# Patient Record
Sex: FEMALE | Race: WHITE | Employment: UNEMPLOYED | URBAN - METROPOLITAN AREA
[De-identification: names, ages, dates, MRNs, and addresses within clinical notes are randomized per-mention and may not be internally consistent; named-entity substitution may affect disease eponyms.]

---

## 2017-02-03 RX ORDER — ESCITALOPRAM OXALATE 20 MG/1
TABLET ORAL
Qty: 30 TABLET | Refills: 3 | Status: SHIPPED | OUTPATIENT
Start: 2017-02-03 | End: 2017-04-21 | Stop reason: SDUPTHER

## 2017-02-15 RX ORDER — OXCARBAZEPINE 150 MG/1
TABLET, FILM COATED ORAL
Qty: 90 TABLET | Refills: 1 | Status: SHIPPED | OUTPATIENT
Start: 2017-02-15 | End: 2017-05-09 | Stop reason: SDUPTHER

## 2017-03-17 RX ORDER — MELOXICAM 15 MG/1
TABLET ORAL
Qty: 30 TABLET | Refills: 1 | Status: SHIPPED | OUTPATIENT
Start: 2017-03-17 | End: 2017-04-21 | Stop reason: SDUPTHER

## 2017-04-24 RX ORDER — MELOXICAM 15 MG/1
TABLET ORAL
Qty: 30 TABLET | Refills: 0 | OUTPATIENT
Start: 2017-04-24 | End: 2017-05-09

## 2017-04-24 RX ORDER — ESCITALOPRAM OXALATE 20 MG/1
TABLET ORAL
Qty: 30 TABLET | Refills: 0 | OUTPATIENT
Start: 2017-04-24 | End: 2017-07-18 | Stop reason: SDUPTHER

## 2017-05-09 ENCOUNTER — OFFICE VISIT (OUTPATIENT)
Dept: FAMILY MEDICINE CLINIC | Age: 57
End: 2017-05-09

## 2017-05-09 VITALS
SYSTOLIC BLOOD PRESSURE: 110 MMHG | BODY MASS INDEX: 20.89 KG/M2 | RESPIRATION RATE: 14 BRPM | HEART RATE: 81 BPM | DIASTOLIC BLOOD PRESSURE: 68 MMHG | HEIGHT: 66 IN | OXYGEN SATURATION: 96 % | WEIGHT: 130 LBS

## 2017-05-09 DIAGNOSIS — J31.0 CHRONIC RHINITIS: ICD-10-CM

## 2017-05-09 DIAGNOSIS — R31.9 HEMATURIA: ICD-10-CM

## 2017-05-09 DIAGNOSIS — F17.210 CIGARETTE NICOTINE DEPENDENCE WITHOUT COMPLICATION: ICD-10-CM

## 2017-05-09 DIAGNOSIS — K76.89 LIVER NODULE: ICD-10-CM

## 2017-05-09 DIAGNOSIS — Z12.39 BREAST CANCER SCREENING: ICD-10-CM

## 2017-05-09 DIAGNOSIS — R51.9 CHRONIC DAILY HEADACHE: Primary | ICD-10-CM

## 2017-05-09 DIAGNOSIS — Z12.2 ENCOUNTER FOR SCREENING FOR LUNG CANCER: ICD-10-CM

## 2017-05-09 DIAGNOSIS — M17.10 ARTHRITIS OF KNEE: ICD-10-CM

## 2017-05-09 DIAGNOSIS — F17.210 NICOTINE DEPENDENCE, CIGARETTES, UNCOMPLICATED: ICD-10-CM

## 2017-05-09 LAB
BILIRUBIN, POC: ABNORMAL
BLOOD URINE, POC: ABNORMAL
CLARITY, POC: CLEAR
COLOR, POC: YELLOW
GLUCOSE URINE, POC: ABNORMAL
KETONES, POC: ABNORMAL
LEUKOCYTE EST, POC: ABNORMAL
NITRITE, POC: ABNORMAL
PH, POC: 7.5
PROTEIN, POC: ABNORMAL
SPECIFIC GRAVITY, POC: 1.02
UROBILINOGEN, POC: 0.2

## 2017-05-09 PROCEDURE — 81002 URINALYSIS NONAUTO W/O SCOPE: CPT | Performed by: FAMILY MEDICINE

## 2017-05-09 PROCEDURE — G0296 VISIT TO DETERM LDCT ELIG: HCPCS | Performed by: FAMILY MEDICINE

## 2017-05-09 PROCEDURE — 99214 OFFICE O/P EST MOD 30 MIN: CPT | Performed by: FAMILY MEDICINE

## 2017-05-09 RX ORDER — OXYBUTYNIN CHLORIDE 10 MG/1
10 TABLET, EXTENDED RELEASE ORAL DAILY
Qty: 30 TABLET | Refills: 2 | Status: SHIPPED | OUTPATIENT
Start: 2017-05-09 | End: 2017-08-23 | Stop reason: SDUPTHER

## 2017-05-09 RX ORDER — MOMETASONE FUROATE 50 UG/1
2 SPRAY, METERED NASAL DAILY
Qty: 1 INHALER | Refills: 2 | Status: SHIPPED | OUTPATIENT
Start: 2017-05-09 | End: 2017-08-31 | Stop reason: ALTCHOICE

## 2017-05-09 RX ORDER — TIZANIDINE 4 MG/1
4 TABLET ORAL NIGHTLY
Qty: 30 TABLET | Refills: 2 | Status: SHIPPED | OUTPATIENT
Start: 2017-05-09 | End: 2017-08-31 | Stop reason: ALTCHOICE

## 2017-05-09 RX ORDER — DICLOFENAC SODIUM 75 MG/1
75 TABLET, DELAYED RELEASE ORAL 2 TIMES DAILY
Qty: 60 TABLET | Refills: 2 | Status: SHIPPED | OUTPATIENT
Start: 2017-05-09 | End: 2017-08-31 | Stop reason: ALTCHOICE

## 2017-05-09 ASSESSMENT — PATIENT HEALTH QUESTIONNAIRE - PHQ9
2. FEELING DOWN, DEPRESSED OR HOPELESS: 0
SUM OF ALL RESPONSES TO PHQ9 QUESTIONS 1 & 2: 0
SUM OF ALL RESPONSES TO PHQ QUESTIONS 1-9: 0
1. LITTLE INTEREST OR PLEASURE IN DOING THINGS: 0

## 2017-05-11 ENCOUNTER — TELEPHONE (OUTPATIENT)
Dept: FAMILY MEDICINE CLINIC | Age: 57
End: 2017-05-11

## 2017-07-18 RX ORDER — ESCITALOPRAM OXALATE 20 MG/1
TABLET ORAL
Qty: 30 TABLET | Refills: 2 | Status: SHIPPED | OUTPATIENT
Start: 2017-07-18 | End: 2017-10-31 | Stop reason: SDUPTHER

## 2017-07-19 ENCOUNTER — TELEPHONE (OUTPATIENT)
Dept: FAMILY MEDICINE CLINIC | Age: 57
End: 2017-07-19

## 2017-07-19 DIAGNOSIS — F17.210 CIGARETTE NICOTINE DEPENDENCE WITHOUT COMPLICATION: ICD-10-CM

## 2017-07-19 DIAGNOSIS — F17.210 NICOTINE DEPENDENCE, CIGARETTES, UNCOMPLICATED: ICD-10-CM

## 2017-07-19 DIAGNOSIS — R16.0 LIVER MASS: Primary | ICD-10-CM

## 2017-08-23 RX ORDER — OXYBUTYNIN CHLORIDE 10 MG/1
TABLET, EXTENDED RELEASE ORAL
Qty: 30 TABLET | Refills: 1 | Status: SHIPPED | OUTPATIENT
Start: 2017-08-23 | End: 2018-05-09 | Stop reason: SDUPTHER

## 2017-08-31 ENCOUNTER — OFFICE VISIT (OUTPATIENT)
Dept: FAMILY MEDICINE CLINIC | Age: 57
End: 2017-08-31

## 2017-08-31 VITALS
RESPIRATION RATE: 12 BRPM | OXYGEN SATURATION: 96 % | HEIGHT: 66 IN | HEART RATE: 89 BPM | SYSTOLIC BLOOD PRESSURE: 116 MMHG | BODY MASS INDEX: 19.44 KG/M2 | DIASTOLIC BLOOD PRESSURE: 78 MMHG | WEIGHT: 121 LBS

## 2017-08-31 DIAGNOSIS — M79.644 PAIN OF RIGHT THUMB: ICD-10-CM

## 2017-08-31 DIAGNOSIS — M25.552 LEFT HIP PAIN: Primary | ICD-10-CM

## 2017-08-31 DIAGNOSIS — G89.29 CHRONIC LOW BACK PAIN, UNSPECIFIED BACK PAIN LATERALITY, WITH SCIATICA PRESENCE UNSPECIFIED: ICD-10-CM

## 2017-08-31 DIAGNOSIS — J30.1 ALLERGIC RHINITIS DUE TO POLLEN, UNSPECIFIED RHINITIS SEASONALITY: ICD-10-CM

## 2017-08-31 DIAGNOSIS — R39.11 URINARY HESITANCY: ICD-10-CM

## 2017-08-31 DIAGNOSIS — R16.0 LIVER MASS: ICD-10-CM

## 2017-08-31 DIAGNOSIS — F33.41 RECURRENT MAJOR DEPRESSIVE DISORDER, IN PARTIAL REMISSION (HCC): ICD-10-CM

## 2017-08-31 DIAGNOSIS — M46.1 SACROILIITIS (HCC): ICD-10-CM

## 2017-08-31 DIAGNOSIS — M54.5 CHRONIC LOW BACK PAIN, UNSPECIFIED BACK PAIN LATERALITY, WITH SCIATICA PRESENCE UNSPECIFIED: ICD-10-CM

## 2017-08-31 DIAGNOSIS — R63.4 WEIGHT LOSS: ICD-10-CM

## 2017-08-31 LAB
BILIRUBIN, POC: ABNORMAL
BLOOD URINE, POC: ABNORMAL
CLARITY, POC: CLEAR
COLOR, POC: YELLOW
GLUCOSE URINE, POC: ABNORMAL
KETONES, POC: ABNORMAL
LEUKOCYTE EST, POC: ABNORMAL
NITRITE, POC: ABNORMAL
PH, POC: 6
PROTEIN, POC: ABNORMAL
SPECIFIC GRAVITY, POC: 1.02
UROBILINOGEN, POC: 0.2

## 2017-08-31 PROCEDURE — 99214 OFFICE O/P EST MOD 30 MIN: CPT | Performed by: FAMILY MEDICINE

## 2017-08-31 PROCEDURE — 81002 URINALYSIS NONAUTO W/O SCOPE: CPT | Performed by: FAMILY MEDICINE

## 2017-08-31 RX ORDER — BUPROPION HYDROCHLORIDE 150 MG/1
150 TABLET ORAL EVERY MORNING
Qty: 30 TABLET | Refills: 0 | Status: SHIPPED | OUTPATIENT
Start: 2017-08-31 | End: 2017-09-26 | Stop reason: SDUPTHER

## 2017-08-31 RX ORDER — MELOXICAM 15 MG/1
15 TABLET ORAL DAILY
Qty: 30 TABLET | Refills: 2 | Status: SHIPPED | OUTPATIENT
Start: 2017-08-31 | End: 2017-10-12

## 2017-09-13 RX ORDER — OXCARBAZEPINE 150 MG/1
TABLET, FILM COATED ORAL
Qty: 90 TABLET | Refills: 0 | Status: SHIPPED | OUTPATIENT
Start: 2017-09-13 | End: 2018-01-01 | Stop reason: SDUPTHER

## 2017-09-15 ENCOUNTER — HOSPITAL ENCOUNTER (OUTPATIENT)
Dept: CT IMAGING | Age: 57
Discharge: HOME | End: 2017-09-15
Admitting: FAMILY MEDICINE

## 2017-09-15 DIAGNOSIS — M46.1 SACROILIITIS (HCC): ICD-10-CM

## 2017-09-15 DIAGNOSIS — M25.552 LEFT HIP PAIN: ICD-10-CM

## 2017-09-15 DIAGNOSIS — F17.210 CIGARETTE NICOTINE DEPENDENCE WITHOUT COMPLICATION: ICD-10-CM

## 2017-09-15 DIAGNOSIS — M79.644 PAIN OF RIGHT THUMB: ICD-10-CM

## 2017-09-15 DIAGNOSIS — R16.0 LIVER MASS: ICD-10-CM

## 2017-09-15 DIAGNOSIS — R16.0 HEPATOMEGALY, NOT ELSEWHERE CLASSIFIED: ICD-10-CM

## 2017-09-15 DIAGNOSIS — M54.5 CHRONIC LOW BACK PAIN, UNSPECIFIED BACK PAIN LATERALITY, WITH SCIATICA PRESENCE UNSPECIFIED: ICD-10-CM

## 2017-09-15 DIAGNOSIS — F17.210 NICOTINE DEPENDENCE, CIGARETTES, UNCOMPLICATED: ICD-10-CM

## 2017-09-15 DIAGNOSIS — G89.29 CHRONIC LOW BACK PAIN, UNSPECIFIED BACK PAIN LATERALITY, WITH SCIATICA PRESENCE UNSPECIFIED: ICD-10-CM

## 2017-09-18 ENCOUNTER — TELEPHONE (OUTPATIENT)
Dept: CASE MANAGEMENT | Age: 57
End: 2017-09-18

## 2017-09-20 ENCOUNTER — TELEPHONE (OUTPATIENT)
Dept: FAMILY MEDICINE CLINIC | Age: 57
End: 2017-09-20

## 2017-09-26 RX ORDER — BUPROPION HYDROCHLORIDE 150 MG/1
TABLET ORAL
Qty: 30 TABLET | Refills: 0 | Status: SHIPPED | OUTPATIENT
Start: 2017-09-26 | End: 2017-10-31 | Stop reason: SDUPTHER

## 2017-10-12 ENCOUNTER — TELEPHONE (OUTPATIENT)
Dept: FAMILY MEDICINE CLINIC | Age: 57
End: 2017-10-12

## 2017-10-12 RX ORDER — NABUMETONE 750 MG/1
1500 TABLET, FILM COATED ORAL DAILY
Qty: 60 TABLET | Refills: 0 | Status: SHIPPED | OUTPATIENT
Start: 2017-10-12 | End: 2017-11-07

## 2017-10-31 RX ORDER — BUPROPION HYDROCHLORIDE 150 MG/1
TABLET ORAL
Qty: 30 TABLET | Refills: 0 | Status: SHIPPED | OUTPATIENT
Start: 2017-10-31 | End: 2017-11-07 | Stop reason: SDUPTHER

## 2017-10-31 RX ORDER — ESCITALOPRAM OXALATE 20 MG/1
TABLET ORAL
Qty: 30 TABLET | Refills: 1 | Status: SHIPPED | OUTPATIENT
Start: 2017-10-31 | End: 2018-01-07 | Stop reason: SDUPTHER

## 2017-11-07 ENCOUNTER — OFFICE VISIT (OUTPATIENT)
Dept: FAMILY MEDICINE CLINIC | Age: 57
End: 2017-11-07

## 2017-11-07 VITALS
RESPIRATION RATE: 14 BRPM | WEIGHT: 122 LBS | DIASTOLIC BLOOD PRESSURE: 66 MMHG | BODY MASS INDEX: 19.61 KG/M2 | HEART RATE: 58 BPM | HEIGHT: 66 IN | SYSTOLIC BLOOD PRESSURE: 102 MMHG

## 2017-11-07 DIAGNOSIS — D13.4 ADENOMA OF LIVER: Primary | ICD-10-CM

## 2017-11-07 DIAGNOSIS — G40.909 SEIZURE DISORDER (HCC): ICD-10-CM

## 2017-11-07 DIAGNOSIS — M18.11 PRIMARY OSTEOARTHRITIS OF FIRST CARPOMETACARPAL JOINT OF RIGHT HAND: ICD-10-CM

## 2017-11-07 DIAGNOSIS — D35.02 ADRENAL ADENOMA, LEFT: ICD-10-CM

## 2017-11-07 DIAGNOSIS — F33.40 RECURRENT MAJOR DEPRESSIVE DISORDER, IN REMISSION (HCC): ICD-10-CM

## 2017-11-07 DIAGNOSIS — M15.9 PRIMARY OSTEOARTHRITIS INVOLVING MULTIPLE JOINTS: ICD-10-CM

## 2017-11-07 PROCEDURE — 99213 OFFICE O/P EST LOW 20 MIN: CPT | Performed by: FAMILY MEDICINE

## 2017-11-07 RX ORDER — BUPROPION HYDROCHLORIDE 150 MG/1
TABLET ORAL
Qty: 30 TABLET | Refills: 5 | Status: SHIPPED | OUTPATIENT
Start: 2017-11-07 | End: 2018-05-23 | Stop reason: SDUPTHER

## 2017-11-07 RX ORDER — METHYLPREDNISOLONE ACETATE 80 MG/ML
20 INJECTION, SUSPENSION INTRA-ARTICULAR; INTRALESIONAL; INTRAMUSCULAR; SOFT TISSUE ONCE
Status: CANCELLED | OUTPATIENT
Start: 2017-11-07 | End: 2017-11-07

## 2017-11-08 RX ORDER — METHYLPREDNISOLONE ACETATE 80 MG/ML
80 INJECTION, SUSPENSION INTRA-ARTICULAR; INTRALESIONAL; INTRAMUSCULAR; SOFT TISSUE ONCE
Status: SHIPPED | OUTPATIENT
Start: 2017-11-07

## 2017-11-08 RX ORDER — METHYLPREDNISOLONE ACETATE 80 MG/ML
20 INJECTION, SUSPENSION INTRA-ARTICULAR; INTRALESIONAL; INTRAMUSCULAR; SOFT TISSUE ONCE
Status: SHIPPED | OUTPATIENT
Start: 2017-11-08

## 2018-01-02 RX ORDER — OXCARBAZEPINE 150 MG/1
TABLET, FILM COATED ORAL
Qty: 90 TABLET | Refills: 0 | Status: SHIPPED | OUTPATIENT
Start: 2018-01-02 | End: 2018-07-09 | Stop reason: SDUPTHER

## 2018-01-08 RX ORDER — ESCITALOPRAM OXALATE 20 MG/1
TABLET ORAL
Qty: 30 TABLET | Refills: 0 | Status: SHIPPED | OUTPATIENT
Start: 2018-01-08 | End: 2018-02-04 | Stop reason: SDUPTHER

## 2018-05-09 ENCOUNTER — OFFICE VISIT (OUTPATIENT)
Dept: FAMILY MEDICINE CLINIC | Age: 58
End: 2018-05-09

## 2018-05-09 VITALS
BODY MASS INDEX: 21.53 KG/M2 | WEIGHT: 134 LBS | DIASTOLIC BLOOD PRESSURE: 70 MMHG | SYSTOLIC BLOOD PRESSURE: 110 MMHG | HEIGHT: 66 IN

## 2018-05-09 DIAGNOSIS — F33.40 RECURRENT MAJOR DEPRESSIVE DISORDER, IN REMISSION (HCC): ICD-10-CM

## 2018-05-09 DIAGNOSIS — F17.210 CIGARETTE NICOTINE DEPENDENCE WITHOUT COMPLICATION: ICD-10-CM

## 2018-05-09 DIAGNOSIS — N32.81 OAB (OVERACTIVE BLADDER): ICD-10-CM

## 2018-05-09 DIAGNOSIS — M18.11 PRIMARY OSTEOARTHRITIS OF FIRST CARPOMETACARPAL JOINT OF RIGHT HAND: Primary | ICD-10-CM

## 2018-05-09 PROCEDURE — 99213 OFFICE O/P EST LOW 20 MIN: CPT | Performed by: FAMILY MEDICINE

## 2018-05-09 PROCEDURE — 20600 DRAIN/INJ JOINT/BURSA W/O US: CPT | Performed by: FAMILY MEDICINE

## 2018-05-09 RX ORDER — OXYBUTYNIN CHLORIDE 10 MG/1
TABLET, EXTENDED RELEASE ORAL
Qty: 30 TABLET | Refills: 5 | Status: SHIPPED | OUTPATIENT
Start: 2018-05-09 | End: 2018-10-23 | Stop reason: ALTCHOICE

## 2018-05-15 RX ORDER — METHYLPREDNISOLONE ACETATE 80 MG/ML
40 INJECTION, SUSPENSION INTRA-ARTICULAR; INTRALESIONAL; INTRAMUSCULAR; SOFT TISSUE ONCE
Status: SHIPPED | OUTPATIENT
Start: 2018-05-09

## 2018-05-23 RX ORDER — BUPROPION HYDROCHLORIDE 150 MG/1
TABLET ORAL
Qty: 30 TABLET | Refills: 4 | Status: SHIPPED | OUTPATIENT
Start: 2018-05-23 | End: 2018-10-23

## 2018-09-06 ENCOUNTER — TELEPHONE (OUTPATIENT)
Dept: FAMILY MEDICINE CLINIC | Age: 58
End: 2018-09-06

## 2018-09-06 ENCOUNTER — OFFICE VISIT (OUTPATIENT)
Dept: FAMILY MEDICINE CLINIC | Age: 58
End: 2018-09-06

## 2018-09-06 VITALS
HEIGHT: 66 IN | DIASTOLIC BLOOD PRESSURE: 64 MMHG | WEIGHT: 122 LBS | SYSTOLIC BLOOD PRESSURE: 98 MMHG | BODY MASS INDEX: 19.61 KG/M2

## 2018-09-06 DIAGNOSIS — M65.4 DE QUERVAIN'S TENOSYNOVITIS, RIGHT: Primary | ICD-10-CM

## 2018-09-06 PROCEDURE — 20550 NJX 1 TENDON SHEATH/LIGAMENT: CPT | Performed by: FAMILY MEDICINE

## 2018-09-06 RX ORDER — METHYLPREDNISOLONE ACETATE 40 MG/ML
40 INJECTION, SUSPENSION INTRA-ARTICULAR; INTRALESIONAL; INTRAMUSCULAR; SOFT TISSUE ONCE
Status: COMPLETED | OUTPATIENT
Start: 2018-09-06 | End: 2018-09-06

## 2018-09-06 RX ADMIN — METHYLPREDNISOLONE ACETATE 40 MG: 40 INJECTION, SUSPENSION INTRA-ARTICULAR; INTRALESIONAL; INTRAMUSCULAR; SOFT TISSUE at 17:40

## 2018-09-06 NOTE — PROGRESS NOTES
Subjective:      Patient ID: Sebastien Caceres is a 66 y.o. female. HPI  Chief Complaint   Patient presents with    Injections     CORITSONE INJECTION IN HAND    having more pain right >> left base of thumb - had injections in mc-c joint in past which seemed to help - pain worst to bend thumb down but now any movement hurts - slight swelling  No am stiffness      Review of Systems    Objective:   Physical Exam   Constitutional: She is oriented to person, place, and time. She appears well-developed and well-nourished. No distress. Eyes: No scleral icterus. Pulmonary/Chest: Effort normal.   Musculoskeletal: She exhibits no edema. Sl swelling/ ttp along mc-c joint area right thumb and up base of first mc bone along tendon  nv grossly intact  From but + finkelsteins   Neurological: She is alert and oriented to person, place, and time. Skin: Skin is warm and dry. Psychiatric: She has a normal mood and affect. Assessment:       Diagnosis Orders   1. De Quervain's tenosynovitis, right             Plan:      40mg depomedrol/ 1cc 0.25% marcaine injected along flexor tendon thumb - pt tolerated well  Dw/ pt risks including flare/ expectations for relief in 2-3 days  F/u and see how doing.   If feels better - would benefit from thumb spica splint to lessen strain on tendon  F/u prn        Monico Weber MD

## 2018-09-24 RX ORDER — ESCITALOPRAM OXALATE 20 MG/1
TABLET ORAL
Qty: 30 TABLET | Refills: 4 | Status: SHIPPED | OUTPATIENT
Start: 2018-09-24 | End: 2019-01-30 | Stop reason: SDUPTHER

## 2018-09-29 ENCOUNTER — HOSPITAL ENCOUNTER (EMERGENCY)
Age: 58
Discharge: HOME | End: 2018-09-29
Attending: EMERGENCY MEDICINE | Admitting: EMERGENCY MEDICINE
Payer: COMMERCIAL

## 2018-09-29 VITALS
HEIGHT: 66 IN | DIASTOLIC BLOOD PRESSURE: 87 MMHG | OXYGEN SATURATION: 99 % | SYSTOLIC BLOOD PRESSURE: 146 MMHG | WEIGHT: 121.47 LBS | TEMPERATURE: 98.3 F | HEART RATE: 65 BPM | BODY MASS INDEX: 19.52 KG/M2 | RESPIRATION RATE: 20 BRPM

## 2018-09-29 DIAGNOSIS — R51.9 ACUTE NONINTRACTABLE HEADACHE, UNSPECIFIED HEADACHE TYPE: Primary | ICD-10-CM

## 2018-09-29 LAB
ANION GAP SERPL CALCULATED.3IONS-SCNC: 10 MMOL/L (ref 3–16)
BACTERIA: ABNORMAL /HPF
BASOPHILS ABSOLUTE: 0.1 K/UL (ref 0–0.2)
BASOPHILS RELATIVE PERCENT: 0.7 %
BILIRUBIN URINE: NEGATIVE
BLOOD, URINE: ABNORMAL
BUN BLDV-MCNC: 11 MG/DL (ref 7–20)
CALCIUM SERPL-MCNC: 9.8 MG/DL (ref 8.3–10.6)
CHLORIDE BLD-SCNC: 103 MMOL/L (ref 99–110)
CLARITY: CLEAR
CO2: 28 MMOL/L (ref 21–32)
COLOR: YELLOW
CREAT SERPL-MCNC: 0.8 MG/DL (ref 0.6–1.1)
EOSINOPHILS ABSOLUTE: 0.2 K/UL (ref 0–0.6)
EOSINOPHILS RELATIVE PERCENT: 3 %
EPITHELIAL CELLS, UA: ABNORMAL /HPF
GFR AFRICAN AMERICAN: >60
GFR NON-AFRICAN AMERICAN: >60
GLUCOSE BLD-MCNC: 107 MG/DL (ref 70–99)
GLUCOSE URINE: NEGATIVE MG/DL
HCT VFR BLD CALC: 48.4 % (ref 36–48)
HEMOGLOBIN: 16 G/DL (ref 12–16)
KETONES, URINE: NEGATIVE MG/DL
LEUKOCYTE ESTERASE, URINE: ABNORMAL
LYMPHOCYTES ABSOLUTE: 1.8 K/UL (ref 1–5.1)
LYMPHOCYTES RELATIVE PERCENT: 22.8 %
MCH RBC QN AUTO: 32.4 PG (ref 26–34)
MCHC RBC AUTO-ENTMCNC: 33.1 G/DL (ref 31–36)
MCV RBC AUTO: 97.7 FL (ref 80–100)
MICROSCOPIC EXAMINATION: YES
MONOCYTES ABSOLUTE: 0.9 K/UL (ref 0–1.3)
MONOCYTES RELATIVE PERCENT: 11.2 %
NEUTROPHILS ABSOLUTE: 5.1 K/UL (ref 1.7–7.7)
NEUTROPHILS RELATIVE PERCENT: 62.3 %
NITRITE, URINE: NEGATIVE
PDW BLD-RTO: 12.9 % (ref 12.4–15.4)
PH UA: 7
PLATELET # BLD: 329 K/UL (ref 135–450)
PMV BLD AUTO: 7 FL (ref 5–10.5)
POTASSIUM SERPL-SCNC: 5.4 MMOL/L (ref 3.5–5.1)
PROTEIN UA: NEGATIVE MG/DL
RBC # BLD: 4.95 M/UL (ref 4–5.2)
RBC UA: ABNORMAL /HPF (ref 0–2)
SODIUM BLD-SCNC: 141 MMOL/L (ref 136–145)
SPECIFIC GRAVITY UA: 1.01
URINE REFLEX TO CULTURE: YES
URINE TYPE: ABNORMAL
UROBILINOGEN, URINE: 0.2 E.U./DL
WBC # BLD: 8.1 K/UL (ref 4–11)
WBC UA: ABNORMAL /HPF (ref 0–5)

## 2018-09-29 PROCEDURE — 99284 EMERGENCY DEPT VISIT MOD MDM: CPT

## 2018-09-29 PROCEDURE — 6370000000 HC RX 637 (ALT 250 FOR IP): Performed by: EMERGENCY MEDICINE

## 2018-09-29 PROCEDURE — 96375 TX/PRO/DX INJ NEW DRUG ADDON: CPT

## 2018-09-29 PROCEDURE — 85025 COMPLETE CBC W/AUTO DIFF WBC: CPT

## 2018-09-29 PROCEDURE — 2580000003 HC RX 258: Performed by: EMERGENCY MEDICINE

## 2018-09-29 PROCEDURE — 96374 THER/PROPH/DIAG INJ IV PUSH: CPT

## 2018-09-29 PROCEDURE — 80048 BASIC METABOLIC PNL TOTAL CA: CPT

## 2018-09-29 PROCEDURE — 81001 URINALYSIS AUTO W/SCOPE: CPT

## 2018-09-29 PROCEDURE — 6360000002 HC RX W HCPCS: Performed by: EMERGENCY MEDICINE

## 2018-09-29 PROCEDURE — 87186 SC STD MICRODIL/AGAR DIL: CPT

## 2018-09-29 PROCEDURE — 87077 CULTURE AEROBIC IDENTIFY: CPT

## 2018-09-29 PROCEDURE — 36415 COLL VENOUS BLD VENIPUNCTURE: CPT

## 2018-09-29 PROCEDURE — 87086 URINE CULTURE/COLONY COUNT: CPT

## 2018-09-29 PROCEDURE — 96361 HYDRATE IV INFUSION ADD-ON: CPT

## 2018-09-29 RX ORDER — SODIUM POLYSTYRENE SULFONATE 15 G/60ML
15 SUSPENSION ORAL; RECTAL ONCE
Status: COMPLETED | OUTPATIENT
Start: 2018-09-29 | End: 2018-09-29

## 2018-09-29 RX ORDER — 0.9 % SODIUM CHLORIDE 0.9 %
500 INTRAVENOUS SOLUTION INTRAVENOUS ONCE
Status: COMPLETED | OUTPATIENT
Start: 2018-09-29 | End: 2018-09-29

## 2018-09-29 RX ORDER — DIPHENHYDRAMINE HYDROCHLORIDE 50 MG/ML
25 INJECTION INTRAMUSCULAR; INTRAVENOUS EVERY 6 HOURS PRN
Status: DISCONTINUED | OUTPATIENT
Start: 2018-09-29 | End: 2018-09-29 | Stop reason: HOSPADM

## 2018-09-29 RX ADMIN — DIPHENHYDRAMINE HYDROCHLORIDE 25 MG: 50 INJECTION, SOLUTION INTRAMUSCULAR; INTRAVENOUS at 18:41

## 2018-09-29 RX ADMIN — SODIUM CHLORIDE 500 ML: 9 INJECTION, SOLUTION INTRAVENOUS at 18:41

## 2018-09-29 RX ADMIN — PROCHLORPERAZINE EDISYLATE 10 MG: 5 INJECTION INTRAMUSCULAR; INTRAVENOUS at 18:41

## 2018-09-29 RX ADMIN — SODIUM POLYSTYRENE SULFONATE 15 G: 15 SUSPENSION ORAL; RECTAL at 19:50

## 2018-09-29 ASSESSMENT — ENCOUNTER SYMPTOMS
ABDOMINAL PAIN: 0
SORE THROAT: 0
VOMITING: 0
SHORTNESS OF BREATH: 0
NAUSEA: 1
RHINORRHEA: 0
EYE REDNESS: 0
PHOTOPHOBIA: 1

## 2018-09-29 ASSESSMENT — PAIN DESCRIPTION - LOCATION: LOCATION: HEAD

## 2018-09-29 ASSESSMENT — PAIN SCALES - GENERAL: PAINLEVEL_OUTOF10: 10

## 2018-09-29 NOTE — ED PROVIDER NOTES
Weight   -- -- -- -- -- -- -- --       Physical Exam   Constitutional: She is oriented to person, place, and time. She appears well-developed and well-nourished. HENT:   Head: Normocephalic and atraumatic. Eyes: Conjunctivae and EOM are normal. Right eye exhibits no discharge. Left eye exhibits no discharge. Pupils equally round. They react to light normally. Neck: Neck supple. No meningismus. No Kernig or percent skew sign. Cardiovascular: Normal rate and normal heart sounds. Exam reveals no friction rub. No murmur heard. Pulmonary/Chest: Effort normal and breath sounds normal. No respiratory distress. She has no wheezes. She has no rales. Abdominal: Soft. Bowel sounds are normal. She exhibits no distension. There is no tenderness. There is no rebound. Musculoskeletal: Normal range of motion. Neurological: She is alert and oriented to person, place, and time. She has normal strength. No cranial nerve deficit or sensory deficit. GCS eye subscore is 4. GCS verbal subscore is 5. GCS motor subscore is 6. Normal speech and thought. Normal strength. Normal sensation. Normal gait. Skin: Skin is warm and dry. No rash noted. She is not diaphoretic. Psychiatric: She has a normal mood and affect. Her behavior is normal.   Nursing note and vitals reviewed.       DIAGNOSTIC RESULTS     EKG: All EKG's are interpreted by the Emergency Department Physician who either signs or Co-signs this chart in the absence of a cardiologist.        RADIOLOGY:   Non-plain film images such as CT, Ultrasound and MRI are read by the radiologist. Plain radiographic images are visualized and preliminarily interpreted by the emergency physician with the below findings:        Interpretation per the Radiologist below, if available at the time of this note:    No orders to display         ED BEDSIDE ULTRASOUND:   Performed by ED Physician - none    LABS:  Labs Reviewed   CBC WITH AUTO DIFFERENTIAL - Abnormal; Notable for the following:        Result Value    Hematocrit 48.4 (*)     All other components within normal limits    Narrative:     Performed at:  [facility]) - [facility]  [address] [facility] [address]   Phone [phone]   BASIC METABOLIC PANEL - Abnormal; Notable for the following:     Potassium 5.4 (*)     Glucose 107 (*)     All other components within normal limits    Narrative:     Performed at:  [facility]) - [facility]  [address] [facility]   Phone [phone]       All other labs were within normal range or not returned as of this dictation. EMERGENCY DEPARTMENT COURSE and DIFFERENTIAL DIAGNOSIS/MDM:   Vitals: There were no vitals filed for this visit. MDM  Number of Diagnoses or Management Options  Acute nonintractable headache, unspecified headache type:   Diagnosis management comments: This patient was seen and evaluated. She is afebrile. She has no meningismus. No Kernig or Brudzinski. She has a nonfocal neurologic exam.  Given the above I do not suspect meningitis or encephalitis. I do not feel that emergent neuro imaging is indicated. The patient states she gets headaches like this from time to time but they are less frequently this intense. She states she gets headaches all the time but from time to time they get worse like this required her to come to the emergency department for medication. At [address] p.m. I reexamine the patient. Her headache is am is completely resolved. She feels much better. She has no further nausea. She continues to have a normal nonfocal neurologic examination. At this time I do not suspect subarachnoid hemorrhage, intracranial hemorrhage. Strict return precautions were given to the patient and the patient's daughter. They had no further concerns or questions at the time of discharge. 1943 - Pt @ time of discharge endorses abnormally smelling urine. UA sent.     In

## 2018-10-02 LAB
ORGANISM: ABNORMAL
URINE CULTURE, ROUTINE: ABNORMAL
URINE CULTURE, ROUTINE: ABNORMAL

## 2018-10-02 NOTE — PROGRESS NOTES
Patient's positive result has been appropriately evaluated by the provider pool. Patient was contacted and notified of the change in treatment plan.     Medication was phoned to the patient's pharmacy per protocol:    Pharmacy:   [address] [facility], [facility] [place] [phone] [place] [phone]  [address] [address]  Phone: [phone] Fax: [phone]    Phone number: [phone]  Pharmacist receiving the prescription: Left message

## 2018-10-23 ENCOUNTER — OFFICE VISIT (OUTPATIENT)
Dept: FAMILY MEDICINE CLINIC | Age: 58
End: 2018-10-23
Payer: COMMERCIAL

## 2018-10-23 VITALS
HEART RATE: 66 BPM | RESPIRATION RATE: 10 BRPM | SYSTOLIC BLOOD PRESSURE: 110 MMHG | BODY MASS INDEX: 19.77 KG/M2 | HEIGHT: 66 IN | DIASTOLIC BLOOD PRESSURE: 60 MMHG | WEIGHT: 123 LBS

## 2018-10-23 DIAGNOSIS — G40.909 SEIZURE DISORDER (HCC): ICD-10-CM

## 2018-10-23 DIAGNOSIS — N30.01 ACUTE CYSTITIS WITH HEMATURIA: ICD-10-CM

## 2018-10-23 DIAGNOSIS — E87.5 HYPERKALEMIA: ICD-10-CM

## 2018-10-23 DIAGNOSIS — M70.62 GREATER TROCHANTERIC BURSITIS OF LEFT HIP: ICD-10-CM

## 2018-10-23 DIAGNOSIS — R51.9 CHRONIC DAILY HEADACHE: Primary | ICD-10-CM

## 2018-10-23 LAB
A/G RATIO: 2.3 (ref 1.1–2.2)
ALBUMIN SERPL-MCNC: 4.5 G/DL (ref 3.4–5)
ALP BLD-CCNC: 49 U/L (ref 40–129)
ALT SERPL-CCNC: 16 U/L (ref 10–40)
ANION GAP SERPL CALCULATED.3IONS-SCNC: 14 MMOL/L (ref 3–16)
AST SERPL-CCNC: 15 U/L (ref 15–37)
BILIRUB SERPL-MCNC: 0.3 MG/DL (ref 0–1)
BILIRUBIN, POC: ABNORMAL
BLOOD URINE, POC: ABNORMAL
BUN BLDV-MCNC: 15 MG/DL (ref 7–20)
CALCIUM SERPL-MCNC: 9.8 MG/DL (ref 8.3–10.6)
CHLORIDE BLD-SCNC: 102 MMOL/L (ref 99–110)
CLARITY, POC: CLEAR
CO2: 25 MMOL/L (ref 21–32)
COLOR, POC: YELLOW
CREAT SERPL-MCNC: 0.7 MG/DL (ref 0.6–1.1)
GFR AFRICAN AMERICAN: >60
GFR NON-AFRICAN AMERICAN: >60
GLOBULIN: 2 G/DL
GLUCOSE BLD-MCNC: 86 MG/DL (ref 70–99)
GLUCOSE URINE, POC: ABNORMAL
KETONES, POC: ABNORMAL
LEUKOCYTE EST, POC: ABNORMAL
NITRITE, POC: ABNORMAL
PH, POC: 6.5
POTASSIUM SERPL-SCNC: 4.7 MMOL/L (ref 3.5–5.1)
PROTEIN, POC: ABNORMAL
SODIUM BLD-SCNC: 141 MMOL/L (ref 136–145)
SPECIFIC GRAVITY, POC: 1.03
TOTAL PROTEIN: 6.5 G/DL (ref 6.4–8.2)
UROBILINOGEN, POC: 0.2

## 2018-10-23 PROCEDURE — 99214 OFFICE O/P EST MOD 30 MIN: CPT | Performed by: FAMILY MEDICINE

## 2018-10-23 PROCEDURE — 81002 URINALYSIS NONAUTO W/O SCOPE: CPT | Performed by: FAMILY MEDICINE

## 2018-10-23 RX ORDER — ARIPIPRAZOLE 5 MG/1
5 TABLET ORAL DAILY
Qty: 30 TABLET | Refills: 2 | Status: SHIPPED | OUTPATIENT
Start: 2018-10-23 | End: 2019-01-30

## 2018-10-23 RX ORDER — TIZANIDINE 4 MG/1
4 TABLET ORAL NIGHTLY
Qty: 30 TABLET | Refills: 2 | Status: SHIPPED | OUTPATIENT
Start: 2018-10-23 | End: 2019-04-11 | Stop reason: ALTCHOICE

## 2018-10-23 NOTE — PROGRESS NOTES
Subjective:      Patient ID: Alethea Melo is a 66 y.o. female. HPI  ALWAYS HAS HEADACHES - SOME DAYS WORSE THAN OTHERS  Almost every day gets ha - happening all life - has ha now. Bitemporal headache - worse on right side - also occurs in back of head - pain up through top of head. Got better w/ ivf/ pain med  Trying to drink more water. Also got treated for uti -   May still have some sx of uti - took once daily antibiotic but doesn't remember name and not in ER records  Cries a lot lately - going through rough time. Chief Complaint   Patient presents with    Follow-Up from [address] [place] [address] coming out of skin - saw spots on xray. BP Readings from Last 3 Encounters:   10/23/18 110/60   09/29/18 (!) 146/87   09/06/18 98/64     Pulse Readings from Last 3 Encounters:   10/23/18 66   09/29/18 65   11/07/17 58     Wt Readings from Last 3 Encounters:   10/23/18 123 lb (55.8 kg)   09/29/18 121 lb 7.6 oz (55.1 kg)   09/06/18 122 lb (55.3 kg)     Diet not very good - forces self to eat - not many veggies. Still pain left leg/ hip area  Had seizure about a month ago - but not often  Bad rxn to flu shot in past  Urine color dark - no burning/ frequency  No cp/palp/sob  occ dizzy -not as much lately  A lot of pain left hip area  occ pain right low quad abd    Review of Systems    Objective:   Physical Exam   Constitutional: She is oriented to person, place, and time. She appears well-developed and well-nourished. No distress. HENT:   Head: Normocephalic and atraumatic. Mouth/Throat: Oropharynx is clear and moist. No oropharyngeal exudate. Eyes: Conjunctivae are normal. No scleral icterus. Neck: No thyromegaly present. Cardiovascular: Normal rate, regular rhythm, normal heart sounds and intact distal pulses. No murmur heard. Pulmonary/Chest: Effort normal and breath sounds normal. No respiratory distress.  She has no

## 2018-11-29 ENCOUNTER — TELEPHONE (OUTPATIENT)
Dept: FAMILY MEDICINE CLINIC | Age: 58
End: 2018-11-29

## 2018-11-29 DIAGNOSIS — Z87.891 PERSONAL HISTORY OF TOBACCO USE: Primary | ICD-10-CM

## 2018-11-29 PROCEDURE — G0296 VISIT TO DETERM LDCT ELIG: HCPCS | Performed by: FAMILY MEDICINE

## 2019-01-30 ENCOUNTER — OFFICE VISIT (OUTPATIENT)
Dept: FAMILY MEDICINE CLINIC | Age: 59
End: 2019-01-30
Payer: COMMERCIAL

## 2019-01-30 VITALS
BODY MASS INDEX: 19.77 KG/M2 | WEIGHT: 123 LBS | HEIGHT: 66 IN | HEART RATE: 62 BPM | DIASTOLIC BLOOD PRESSURE: 60 MMHG | SYSTOLIC BLOOD PRESSURE: 94 MMHG | RESPIRATION RATE: 14 BRPM

## 2019-01-30 DIAGNOSIS — G40.909 SEIZURE DISORDER (HCC): ICD-10-CM

## 2019-01-30 DIAGNOSIS — R47.81 SLURRED SPEECH: ICD-10-CM

## 2019-01-30 DIAGNOSIS — M65.4 TENOSYNOVITIS, DE QUERVAIN: ICD-10-CM

## 2019-01-30 DIAGNOSIS — R51.9 CHRONIC DAILY HEADACHE: ICD-10-CM

## 2019-01-30 DIAGNOSIS — M19.90 ARTHRITIS: Primary | ICD-10-CM

## 2019-01-30 DIAGNOSIS — I67.1 CEREBRAL ANEURYSM: ICD-10-CM

## 2019-01-30 DIAGNOSIS — F32.4 MAJOR DEPRESSIVE DISORDER WITH SINGLE EPISODE, IN PARTIAL REMISSION (HCC): ICD-10-CM

## 2019-01-30 PROCEDURE — 99214 OFFICE O/P EST MOD 30 MIN: CPT | Performed by: FAMILY MEDICINE

## 2019-01-30 RX ORDER — ESCITALOPRAM OXALATE 20 MG/1
TABLET ORAL
Qty: 30 TABLET | Refills: 5 | Status: SHIPPED | OUTPATIENT
Start: 2019-01-30 | End: 2019-10-25 | Stop reason: SDUPTHER

## 2019-01-30 RX ORDER — MIRTAZAPINE 30 MG/1
15 TABLET, FILM COATED ORAL NIGHTLY
Qty: 30 TABLET | Refills: 2 | Status: SHIPPED | OUTPATIENT
Start: 2019-01-30 | End: 2019-04-11 | Stop reason: ALTCHOICE

## 2019-01-30 RX ORDER — OXCARBAZEPINE 150 MG/1
150 TABLET, FILM COATED ORAL 3 TIMES DAILY
Qty: 90 TABLET | Refills: 5 | Status: SHIPPED | OUTPATIENT
Start: 2019-01-30 | End: 2021-03-11

## 2019-03-19 ENCOUNTER — HOSPITAL ENCOUNTER (OUTPATIENT)
Dept: CT IMAGING | Age: 59
Discharge: HOME | End: 2019-03-19
Payer: COMMERCIAL

## 2019-03-19 ENCOUNTER — HOSPITAL ENCOUNTER (OUTPATIENT)
Dept: MRI IMAGING | Age: 59
Discharge: HOME | End: 2019-03-19
Payer: COMMERCIAL

## 2019-03-19 DIAGNOSIS — I67.1 CEREBRAL ANEURYSM: ICD-10-CM

## 2019-03-19 DIAGNOSIS — Z87.891 PERSONAL HISTORY OF TOBACCO USE: ICD-10-CM

## 2019-03-19 DIAGNOSIS — R51.9 CHRONIC DAILY HEADACHE: ICD-10-CM

## 2019-03-19 PROCEDURE — 70551 MRI BRAIN STEM W/O DYE: CPT

## 2019-03-19 PROCEDURE — G0297 LDCT FOR LUNG CA SCREEN: HCPCS

## 2019-03-19 PROCEDURE — 70544 MR ANGIOGRAPHY HEAD W/O DYE: CPT

## 2019-04-11 ENCOUNTER — OFFICE VISIT (OUTPATIENT)
Dept: FAMILY MEDICINE CLINIC | Age: 59
End: 2019-04-11
Payer: COMMERCIAL

## 2019-04-11 VITALS
DIASTOLIC BLOOD PRESSURE: 70 MMHG | WEIGHT: 123 LBS | BODY MASS INDEX: 19.77 KG/M2 | HEART RATE: 114 BPM | RESPIRATION RATE: 12 BRPM | SYSTOLIC BLOOD PRESSURE: 128 MMHG | HEIGHT: 66 IN

## 2019-04-11 DIAGNOSIS — G40.909 SEIZURE DISORDER (HCC): ICD-10-CM

## 2019-04-11 DIAGNOSIS — F17.210 CIGARETTE NICOTINE DEPENDENCE WITHOUT COMPLICATION: ICD-10-CM

## 2019-04-11 DIAGNOSIS — M17.10 ARTHRITIS OF KNEE: ICD-10-CM

## 2019-04-11 DIAGNOSIS — M19.049 HAND ARTHRITIS: Primary | ICD-10-CM

## 2019-04-11 DIAGNOSIS — G47.00 INSOMNIA, UNSPECIFIED TYPE: ICD-10-CM

## 2019-04-11 PROCEDURE — 99214 OFFICE O/P EST MOD 30 MIN: CPT | Performed by: FAMILY MEDICINE

## 2019-04-11 RX ORDER — DOXYCYCLINE HYCLATE 100 MG
100 TABLET ORAL 2 TIMES DAILY
Qty: 14 TABLET | Refills: 0 | Status: SHIPPED | OUTPATIENT
Start: 2019-04-11 | End: 2019-04-18

## 2019-04-11 RX ORDER — DOXEPIN HYDROCHLORIDE 10 MG/1
10 CAPSULE ORAL NIGHTLY
Qty: 30 CAPSULE | Refills: 3 | Status: SHIPPED | OUTPATIENT
Start: 2019-04-11 | End: 2019-10-25

## 2019-04-11 RX ORDER — DULOXETIN HYDROCHLORIDE 30 MG/1
CAPSULE, DELAYED RELEASE ORAL
Qty: 30 CAPSULE | Refills: 0 | Status: SHIPPED | OUTPATIENT
Start: 2019-04-11 | End: 2019-04-15 | Stop reason: SDUPTHER

## 2019-04-11 RX ORDER — DULOXETIN HYDROCHLORIDE 60 MG/1
60 CAPSULE, DELAYED RELEASE ORAL DAILY
Qty: 30 CAPSULE | Refills: 2 | Status: SHIPPED | OUTPATIENT
Start: 2019-04-11 | End: 2019-08-27

## 2019-04-11 NOTE — PROGRESS NOTES
Chief Complaint   Patient presents with    Results     GO OVER MRA RESULTS AND DISCUSS PAIN IN HER HANDS USING DICLOFENAC ON HANDS AND IT DOES NOT HELP HER AT ALL      BP Readings from Last 3 Encounters:   04/11/19 128/70   01/30/19 94/60   10/23/18 110/60     Pulse Readings from Last 3 Encounters:   04/11/19 114   01/30/19 62   10/23/18 66     Wt Readings from Last 3 Encounters:   04/11/19 123 lb (55.8 kg)   01/30/19 123 lb (55.8 kg)   10/23/18 123 lb (55.8 kg)     Stress w/ ex-boyfriend  Diclofenac not helping much w/ gel in hands though helps hands   Left hand very pain huseyin base of thumb > right - pain to turn arm  Bad arthritis in thumb/ hand 1.5 years ago - can get sharp intense pain - not really stiff -just ache/ pain  Hard to paint/ do crafts w/ hands  Considering disability  Exam degraded by motion artifact.  The right middle cerebral artery is mostly   obscured by susceptibility artifact from aneurysm clips.    mra done 3/19   Couldn't tolerate thumb spica splint on either side  Sees neuro end of month -on trileptal - last seizure 2 in last 2 years. On asa daily  Taking lexapro 20/d  Took remeron for 2 weeks but felt too drowsy next morning. Never took cymbalta - sleeps late  - goes to bed late  Sleeps 4-10 am - pretty well. Using boost supplement daily - weight staying stable  Smoking less  Green yellow mucus in chest/ nose - a lot of sinus pressure/ congestion - getting mucus up  No f/c    pe - nad  Boggy uvula  Lungs -few end exp rhonchi  cv rrr  abd soft nd/nt  No edema    A/p -    Diagnosis Orders   1. Hand arthritis  Brianna Beck MD, Hand Surgery (Hand, Wrist, Upper Extremity), [facility]   2. Insomnia, unspecified type     3. Seizure disorder ([address]     4. Cigarette nicotine dependence without complication     5. Arthritis of knee     6.       Copd exacerbation    Refer hand surgeon for eval of hand oa -diclofenac gel - disability referral  voltaren helps knees more than hands  Doxy 100 bid for 7 days  mucinex and increase hydration  Activity as tolerated  F/u neuro soon - cont trileptal for seizures  Change lexapro over to cymbalta for mood/ pain -se d/w pt  Doxepin prn sleep  Smoking cessation d/w pt  F/u 3 months/ prn

## 2019-04-12 ENCOUNTER — TELEPHONE (OUTPATIENT)
Dept: INTERNAL MEDICINE CLINIC | Age: 59
End: 2019-04-12

## 2019-04-12 NOTE — TELEPHONE ENCOUNTER
SPOKE TO PHARMACY THEY WILL NOT COVER BID RX IS FOR 30MG 1 DAILY FOR 6 DAYS AND THEN BID. CAN WE SEND AN RX FOR 30MG DAILY FOR 6 DAYS AND THEN 60MG ONCE DAILY?

## 2019-04-15 RX ORDER — DULOXETIN HYDROCHLORIDE 30 MG/1
CAPSULE, DELAYED RELEASE ORAL
Qty: 6 CAPSULE | Refills: 0 | Status: SHIPPED | OUTPATIENT
Start: 2019-04-15 | End: 2019-08-27 | Stop reason: SDUPTHER

## 2019-04-15 NOTE — TELEPHONE ENCOUNTER
SPOKE TO PHARM AND ADVISED. THEY HAVE BOTH RXS AND SO FAR IT IS NOT REJECTING. WILL CALL IF PROBLEMS.

## 2019-04-17 ENCOUNTER — TELEPHONE (OUTPATIENT)
Dept: FAMILY MEDICINE CLINIC | Age: 59
End: 2019-04-17

## 2019-04-17 NOTE — TELEPHONE ENCOUNTER
CALLED PHARMACY. ADVISED IT WAS APPROVED AND READY FOR . CALLED LMOM FOR PT TO ADVISE THAT THIS WAS APPROVED FOR 1X DAILY AND CAN  AT PHARMACY AND EL GAVE HER RX SCRIPT FOR THE 60 AT HER VISIT.

## 2019-04-19 RX ORDER — DULOXETIN HYDROCHLORIDE 30 MG/1
CAPSULE, DELAYED RELEASE ORAL
Qty: 6 CAPSULE | Refills: 0 | OUTPATIENT
Start: 2019-04-19

## 2019-05-08 ENCOUNTER — OFFICE VISIT (OUTPATIENT)
Dept: ORTHOPEDIC SURGERY | Age: 59
End: 2019-05-08
Payer: COMMERCIAL

## 2019-05-08 VITALS
WEIGHT: 123 LBS | BODY MASS INDEX: 19.77 KG/M2 | HEART RATE: 95 BPM | SYSTOLIC BLOOD PRESSURE: 104 MMHG | HEIGHT: 66 IN | DIASTOLIC BLOOD PRESSURE: 71 MMHG

## 2019-05-08 DIAGNOSIS — M18.0 PRIMARY OSTEOARTHRITIS OF BOTH FIRST CARPOMETACARPAL JOINTS: Primary | ICD-10-CM

## 2019-05-08 PROCEDURE — MISCCMC2 OTS BREG CMC THUMB GUARD: Performed by: ORTHOPAEDIC SURGERY

## 2019-05-08 PROCEDURE — 99243 OFF/OP CNSLTJ NEW/EST LOW 30: CPT | Performed by: ORTHOPAEDIC SURGERY

## 2019-05-08 NOTE — Clinical Note
Dear  Anabell Fischer MD,Thank you very much for your referral or Ms. Harpreet Slater to me for evaluation and treatment of her Hand & Wrist condition. I appreciate your confidence in me and thank you for allowing me the opportunity to care for your patients. If I can be of any further assistance to you on this or any other patient, please do not hesitate to contact me. Sincerely,Jasper Guadalupe MD

## 2019-05-09 NOTE — PROGRESS NOTES
palpation. Muscular strength is clinically appropriate bilaterally. Examination of the right first Carpo-Metacarpal Joint of the wrist demonstrates moderate radial subluxation of the Thumb Metacarpal base upon the Trapezium. There is moderate pain with palpation at the [facility] joint line; pain is markedly worsened with Crank & Grind Maneuvers. There is not additional discomfort at the Zrdtjh-Gahslorpv-Toqrshiht joint. An adduction contracture of the first web space has not developed with a compensatory hyper-extension deformity at the MP joint measuring 0 degrees. Examination for Stenosing Tenosynovitis demonstrates no evidence of tenderness, thickening or nodularity at the A-1 pulleys of the digits bilaterally. There no palpable triggering or any finger or thumb. Examination of the first dorsal extensor compartments of the wrist demonstrates no thickening or fullness. There is no tenderness to palpation over the extensor tendons. Pain is not elicited with resisted thumb extension, and Finklestein's maneuver is negative bilaterally. Radiographic Evaluation:  Radiographs are reviewed  today (3 views of the right hand). They demonstrate no evidence of an acute fracture. There is moderate osteoarthritis of the first [facility] Joint with mild joint narrowing and little osteophyte formation, & 10 degrees of MP joint hyper-extension. There is not evidence of other injury or bony fracture. Impression:  Ms. Thang Miguel has developed degenerative osteoarthritis of the thumb [facility] joint bilaterally and presents requesting further treatment. Plan:  I have discussed with Ms. Thang Miguel the conservative treatment options for treatment of right basilar joint arthritis including activity modification appropriate splint wear, and judicious use of anti-inflammatory medications and icing as needed. We also discussed the use of a supportive orthosis for symptom control.   An appropriately sized hand based neoprene

## 2019-05-09 NOTE — PATIENT INSTRUCTIONS
.Instructions for Splint Use  Thumb Arthritis      1. Wear brace for ANY activity which is known to cause you pain. .    2.  You may wear brace during daytime hours as needed for specific activities which cause symptoms    3. You may wear the brace at night for sleep if you have pain during the night. 4.  Please do not wear brace all of the time as this will only make your wrist stiff and more uncomfortable. 5.  Brace does not need to be worn tightly, only secure enough to keep it from slipping or coming out of position. Brace may be Hand Washed Only. Make sure brace is completely dry prior to wearing. Thank you for choosing [facility]) Physicians for your Hand and Upper Extremity needs. If we can be of any further assistance to you, please do not hesitate to contact us.     Office Phone Number:  [phone]  or  [phone]

## 2019-07-26 ENCOUNTER — APPOINTMENT (OUTPATIENT)
Dept: CT IMAGING | Age: 59
End: 2019-07-26
Payer: COMMERCIAL

## 2019-07-26 ENCOUNTER — HOSPITAL ENCOUNTER (EMERGENCY)
Age: 59
Discharge: HOME | End: 2019-07-26
Attending: EMERGENCY MEDICINE
Payer: COMMERCIAL

## 2019-07-26 VITALS
TEMPERATURE: 97.7 F | OXYGEN SATURATION: 98 % | HEIGHT: 66 IN | HEART RATE: 66 BPM | BODY MASS INDEX: 18.16 KG/M2 | RESPIRATION RATE: 12 BRPM | DIASTOLIC BLOOD PRESSURE: 82 MMHG | WEIGHT: 113 LBS | SYSTOLIC BLOOD PRESSURE: 167 MMHG

## 2019-07-26 DIAGNOSIS — N10 ACUTE PYELONEPHRITIS: Primary | ICD-10-CM

## 2019-07-26 DIAGNOSIS — Z72.0 TOBACCO ABUSE: ICD-10-CM

## 2019-07-26 LAB
A/G RATIO: 1.4 (ref 1.1–2.2)
ALBUMIN SERPL-MCNC: 4.1 G/DL (ref 3.4–5)
ALP BLD-CCNC: 47 U/L (ref 40–129)
ALT SERPL-CCNC: 35 U/L (ref 10–40)
ANION GAP SERPL CALCULATED.3IONS-SCNC: 13 MMOL/L (ref 3–16)
AST SERPL-CCNC: 34 U/L (ref 15–37)
BACTERIA: ABNORMAL /HPF
BASOPHILS ABSOLUTE: 0.1 K/UL (ref 0–0.2)
BASOPHILS RELATIVE PERCENT: 0.6 %
BILIRUB SERPL-MCNC: 0.3 MG/DL (ref 0–1)
BILIRUBIN URINE: NEGATIVE
BLOOD, URINE: ABNORMAL
BUN BLDV-MCNC: 12 MG/DL (ref 7–20)
CALCIUM SERPL-MCNC: 9 MG/DL (ref 8.3–10.6)
CHLORIDE BLD-SCNC: 96 MMOL/L (ref 99–110)
CLARITY: CLEAR
CO2: 23 MMOL/L (ref 21–32)
COLOR: YELLOW
CREAT SERPL-MCNC: 0.7 MG/DL (ref 0.6–1.1)
EOSINOPHILS ABSOLUTE: 0 K/UL (ref 0–0.6)
EOSINOPHILS RELATIVE PERCENT: 0.3 %
EPITHELIAL CELLS, UA: 1 /HPF (ref 0–5)
GFR AFRICAN AMERICAN: >60
GFR NON-AFRICAN AMERICAN: >60
GLOBULIN: 2.9 G/DL
GLUCOSE BLD-MCNC: 118 MG/DL (ref 70–99)
GLUCOSE URINE: NEGATIVE MG/DL
HCT VFR BLD CALC: 48.9 % (ref 36–48)
HEMOGLOBIN: 16.6 G/DL (ref 12–16)
HYALINE CASTS: 1 /LPF (ref 0–8)
KETONES, URINE: 40 MG/DL
LACTIC ACID: 1 MMOL/L (ref 0.4–2)
LEUKOCYTE ESTERASE, URINE: ABNORMAL
LIPASE: 15 U/L (ref 13–60)
LYMPHOCYTES ABSOLUTE: 1.4 K/UL (ref 1–5.1)
LYMPHOCYTES RELATIVE PERCENT: 15 %
MCH RBC QN AUTO: 32.3 PG (ref 26–34)
MCHC RBC AUTO-ENTMCNC: 34 G/DL (ref 31–36)
MCV RBC AUTO: 94.9 FL (ref 80–100)
MICROSCOPIC EXAMINATION: YES
MONOCYTES ABSOLUTE: 0.8 K/UL (ref 0–1.3)
MONOCYTES RELATIVE PERCENT: 8.8 %
NEUTROPHILS ABSOLUTE: 7.3 K/UL (ref 1.7–7.7)
NEUTROPHILS RELATIVE PERCENT: 75.3 %
NITRITE, URINE: POSITIVE
PDW BLD-RTO: 14.1 % (ref 12.4–15.4)
PH UA: 6.5 (ref 5–8)
PLATELET # BLD: 284 K/UL (ref 135–450)
PMV BLD AUTO: 7.2 FL (ref 5–10.5)
POTASSIUM SERPL-SCNC: 4.8 MMOL/L (ref 3.5–5.1)
PROTEIN UA: NEGATIVE MG/DL
RBC # BLD: 5.16 M/UL (ref 4–5.2)
RBC UA: 18 /HPF (ref 0–4)
SODIUM BLD-SCNC: 132 MMOL/L (ref 136–145)
SPECIFIC GRAVITY UA: 1.01 (ref 1–1.03)
TOTAL PROTEIN: 7 G/DL (ref 6.4–8.2)
URINE REFLEX TO CULTURE: YES
URINE TYPE: ABNORMAL
UROBILINOGEN, URINE: 0.2 E.U./DL
WBC # BLD: 9.7 K/UL (ref 4–11)
WBC UA: 4 /HPF (ref 0–5)

## 2019-07-26 PROCEDURE — 6360000002 HC RX W HCPCS: Performed by: PHYSICIAN ASSISTANT

## 2019-07-26 PROCEDURE — 74177 CT ABD & PELVIS W/CONTRAST: CPT

## 2019-07-26 PROCEDURE — 96376 TX/PRO/DX INJ SAME DRUG ADON: CPT

## 2019-07-26 PROCEDURE — 83690 ASSAY OF LIPASE: CPT

## 2019-07-26 PROCEDURE — 87086 URINE CULTURE/COLONY COUNT: CPT

## 2019-07-26 PROCEDURE — 83605 ASSAY OF LACTIC ACID: CPT

## 2019-07-26 PROCEDURE — 96361 HYDRATE IV INFUSION ADD-ON: CPT

## 2019-07-26 PROCEDURE — 99284 EMERGENCY DEPT VISIT MOD MDM: CPT

## 2019-07-26 PROCEDURE — 6360000004 HC RX CONTRAST MEDICATION: Performed by: PHYSICIAN ASSISTANT

## 2019-07-26 PROCEDURE — 36415 COLL VENOUS BLD VENIPUNCTURE: CPT

## 2019-07-26 PROCEDURE — 81001 URINALYSIS AUTO W/SCOPE: CPT

## 2019-07-26 PROCEDURE — 80053 COMPREHEN METABOLIC PANEL: CPT

## 2019-07-26 PROCEDURE — 96375 TX/PRO/DX INJ NEW DRUG ADDON: CPT

## 2019-07-26 PROCEDURE — 87077 CULTURE AEROBIC IDENTIFY: CPT

## 2019-07-26 PROCEDURE — 87186 SC STD MICRODIL/AGAR DIL: CPT

## 2019-07-26 PROCEDURE — 2580000003 HC RX 258: Performed by: PHYSICIAN ASSISTANT

## 2019-07-26 PROCEDURE — 85025 COMPLETE CBC W/AUTO DIFF WBC: CPT

## 2019-07-26 PROCEDURE — 96374 THER/PROPH/DIAG INJ IV PUSH: CPT

## 2019-07-26 RX ORDER — ONDANSETRON 4 MG/1
4 TABLET, ORALLY DISINTEGRATING ORAL EVERY 8 HOURS PRN
Qty: 20 TABLET | Refills: 0 | Status: SHIPPED | OUTPATIENT
Start: 2019-07-26 | End: 2021-03-11

## 2019-07-26 RX ORDER — 0.9 % SODIUM CHLORIDE 0.9 %
1000 INTRAVENOUS SOLUTION INTRAVENOUS ONCE
Status: COMPLETED | OUTPATIENT
Start: 2019-07-26 | End: 2019-07-26

## 2019-07-26 RX ORDER — HYDROCODONE BITARTRATE AND ACETAMINOPHEN 5; 325 MG/1; MG/1
1 TABLET ORAL EVERY 6 HOURS PRN
Qty: 5 TABLET | Refills: 0 | Status: SHIPPED | OUTPATIENT
Start: 2019-07-26 | End: 2019-07-28

## 2019-07-26 RX ORDER — MORPHINE SULFATE 4 MG/ML
4 INJECTION, SOLUTION INTRAMUSCULAR; INTRAVENOUS EVERY 30 MIN PRN
Status: COMPLETED | OUTPATIENT
Start: 2019-07-26 | End: 2019-07-26

## 2019-07-26 RX ORDER — ONDANSETRON 2 MG/ML
4 INJECTION INTRAMUSCULAR; INTRAVENOUS ONCE
Status: COMPLETED | OUTPATIENT
Start: 2019-07-26 | End: 2019-07-26

## 2019-07-26 RX ORDER — NAPROXEN 500 MG/1
500 TABLET ORAL 2 TIMES DAILY WITH MEALS
Qty: 30 TABLET | Refills: 0 | Status: SHIPPED | OUTPATIENT
Start: 2019-07-26 | End: 2019-07-26

## 2019-07-26 RX ORDER — CEPHALEXIN 500 MG/1
500 CAPSULE ORAL 3 TIMES DAILY
Qty: 28 CAPSULE | Refills: 0 | Status: SHIPPED | OUTPATIENT
Start: 2019-07-26 | End: 2019-08-05

## 2019-07-26 RX ORDER — KETOROLAC TROMETHAMINE 30 MG/ML
30 INJECTION, SOLUTION INTRAMUSCULAR; INTRAVENOUS ONCE
Status: COMPLETED | OUTPATIENT
Start: 2019-07-26 | End: 2019-07-26

## 2019-07-26 RX ORDER — IBUPROFEN 600 MG/1
600 TABLET ORAL EVERY 6 HOURS PRN
Qty: 30 TABLET | Refills: 0 | Status: SHIPPED | OUTPATIENT
Start: 2019-07-26 | End: 2021-03-11

## 2019-07-26 RX ADMIN — Medication 1 G: at 11:33

## 2019-07-26 RX ADMIN — ONDANSETRON 4 MG: 2 INJECTION INTRAMUSCULAR; INTRAVENOUS at 09:37

## 2019-07-26 RX ADMIN — IOPAMIDOL 75 ML: 755 INJECTION, SOLUTION INTRAVENOUS at 10:52

## 2019-07-26 RX ADMIN — MORPHINE SULFATE 4 MG: 4 INJECTION, SOLUTION INTRAMUSCULAR; INTRAVENOUS at 09:38

## 2019-07-26 RX ADMIN — MORPHINE SULFATE 4 MG: 4 INJECTION, SOLUTION INTRAMUSCULAR; INTRAVENOUS at 10:28

## 2019-07-26 RX ADMIN — KETOROLAC TROMETHAMINE 30 MG: 30 INJECTION, SOLUTION INTRAMUSCULAR; INTRAVENOUS at 11:32

## 2019-07-26 RX ADMIN — SODIUM CHLORIDE 1000 ML: 9 INJECTION, SOLUTION INTRAVENOUS at 09:37

## 2019-07-26 RX ADMIN — MORPHINE SULFATE 4 MG: 4 INJECTION, SOLUTION INTRAMUSCULAR; INTRAVENOUS at 11:33

## 2019-07-26 ASSESSMENT — ENCOUNTER SYMPTOMS
BLOOD IN STOOL: 0
VOMITING: 0
DIARRHEA: 1
ABDOMINAL PAIN: 1
RHINORRHEA: 0
BACK PAIN: 1
COUGH: 0
NAUSEA: 1
SHORTNESS OF BREATH: 0

## 2019-07-26 ASSESSMENT — PAIN SCALES - GENERAL
PAINLEVEL_OUTOF10: 5
PAINLEVEL_OUTOF10: 8

## 2019-07-26 ASSESSMENT — PAIN DESCRIPTION - PAIN TYPE: TYPE: ACUTE PAIN

## 2019-07-26 ASSESSMENT — PAIN DESCRIPTION - LOCATION: LOCATION: BACK;ABDOMEN

## 2019-07-26 NOTE — ED PROVIDER NOTES
Grandfather         Prostate          SOCIAL HISTORY       Social History     Socioeconomic History    Marital status:      Spouse name: None    Number of children: None    Years of education: None    Highest education level: None   Occupational History    None   Social Needs    Financial resource strain: None    Food insecurity:     Worry: None     Inability: None    Transportation needs:     Medical: None     Non-medical: None   Tobacco Use    Smoking status: Current Every Day Smoker     Packs/day: 1.00     Years: 39.00     Pack years: 39.00     Types: Cigarettes    Smokeless tobacco: Never Used   Substance and Sexual Activity    Alcohol use: Yes     Alcohol/week: 0.0 standard drinks     Comment: 6 pack a week    Drug use: Yes     Types: Marijuana     Comment: last time yesterday    Sexual activity: Not Currently     Partners: Male   Lifestyle    Physical activity:     Days per week: None     Minutes per session: None    Stress: None   Relationships    Social connections:     Talks on phone: None     Gets together: None     Attends [employer] service: None     Active member of club or organization: None     Attends meetings of clubs or organizations: None     Relationship status: None    Intimate partner violence:     Fear of current or ex partner: None     Emotionally abused: None     Physically abused: None     Forced sexual activity: None   Other Topics Concern    None   Social History Narrative    None       SCREENINGS             PHYSICAL EXAM    (up to 7 for level 4, 8 or more for level 5)     ED Triage Vitals [07/26/19 0903]   BP Temp Temp Source Pulse Resp SpO2 Height Weight   (!) 167/86 97.7 °F (36.5 °C) Oral 66 12 98 % 5' 6\" (1.676 m) 113 lb (51.3 kg)       Physical Exam   Constitutional: She is oriented to person, place, and time. She appears well-developed and well-nourished. HENT:   Head: Normocephalic and atraumatic.    Right Ear: External ear normal.   Left Ear: External [place] [facility]   Phone [phone]   MICROSCOPIC URINALYSIS - Abnormal; Notable for the following components:    Bacteria, UA 4+ (*)     RBC, UA 18 (*)     All other components within normal limits    Narrative:     Performed at:  [facility]  [address], [place]   Phone [phone]   URINE CULTURE   LIPASE    Narrative:     Performed at:  [facility]  [address] [facility]  [place], [place] [employer]   Phone [phone]   LACTIC ACID, PLASMA    Narrative:     Performed at:  [facility]  [address]  [place], [place] [employer]   Phone [phone]       All other labs were within normal range or not returned as of this dictation. EKG: All EKG's are interpreted by the Emergency Department Physician in the absence of a cardiologist.  Please see their note for interpretation of EKG. RADIOLOGY:   Non-plain film images such as CT, Ultrasound and MRI are read by the radiologist. Plain radiographic images are visualized andpreliminarily interpreted by the  ED Provider with the below findings:        Interpretation perthe Radiologist below, if available at the time of this note:    CT ABDOMEN PELVIS W IV CONTRAST Additional Contrast? None   Final Result   No acute abnormality of the abdomen or pelvis. No results found.         PROCEDURES   Unless otherwise noted below, none     Procedures    CRITICAL CARE TIME   N/A    CONSULTS:  None      EMERGENCY DEPARTMENT COURSE and DIFFERENTIAL DIAGNOSIS/MDM:   Vitals:    Vitals:    07/26/19 0903 07/26/19 1030   BP: (!) 167/86 (!) 167/82   Pulse: 66    Resp: 12    Temp: 97.7 °F (36.5 °C)    TempSrc: Oral    SpO2: 98%    Weight: 113 lb (51.3 kg)    Height: 5' 6\" (1.676 m)        Patient was given thefollowing medications:  Medications   0.9 % sodium chloride bolus (0 mLs Intravenous Stopped 7/26/19 1100)   ondansetron (ZOFRAN) injection 4 mg (4 mg Intravenous Given 7/26/19 0937)   morphine injection 4 mg (4 mg Intravenous Given 7/26/19 1133)   iopamidol (ISOVUE-370) 76 % injection 75 mL (75 mLs Intravenous Given 7/26/19 1052)   cefTRIAXone (ROCEPHIN) 1 g in sterile water 10 mL IV syringe (1 g Intravenous Given 7/26/19 1133)   ketorolac (TORADOL) injection 30 mg (30 mg Intravenous Given 7/26/19 1132)     The patient  presents to the emergency department today for evaluation for back pain and abdominal pain. The patient states that she has been experiencing diffuse lower back pain and this is been constant for the past week. She denies falling or injuring her back in any way but she states that \"I work hard, it probably happened at [employer]". The patient states that this morning she noticed lower abdominal pain which is radiating through to her back. The patient states that her pain is sharp, constant and is rated as an 8/10. She denies any known alleviating or aggravating factors. She denies any numbness, tingling or weakness. No bowel or bladder incontinence or retention. No fever chills. No nausea or vomiting. No IV drug use. Patient states that she is feeling nauseous although she denies any vomiting. She has had some looser stools, although she denies any blood in the stool or black tarry appearance the stool. She denies any chest pain or shortness of breath. No cough or congestion. She is also having some pain with urination although she denies any hematuria. She has no other complaints at this time    On physical exam she does have tenderness to her suprapubic abdomen, and left lower abdomen. She has tenderness over her left CVA region; is no tenderness over the back. CBC shows no evidence of leukocytosis or anemia. CMP is unremarkable, renal function is normal.  Her lactic acid is normal.    Urine shows infection with positive nitrites and trace leukocytes, CT is unremarkable.     Patient has no back tenderness for me, she does have CVA

## 2019-07-28 LAB
ORGANISM: ABNORMAL
URINE CULTURE, ROUTINE: ABNORMAL
URINE CULTURE, ROUTINE: ABNORMAL

## 2019-08-27 ENCOUNTER — OFFICE VISIT (OUTPATIENT)
Dept: FAMILY MEDICINE CLINIC | Age: 59
End: 2019-08-27
Payer: COMMERCIAL

## 2019-08-27 VITALS
SYSTOLIC BLOOD PRESSURE: 128 MMHG | RESPIRATION RATE: 16 BRPM | DIASTOLIC BLOOD PRESSURE: 82 MMHG | OXYGEN SATURATION: 99 % | HEART RATE: 96 BPM | BODY MASS INDEX: 18 KG/M2 | WEIGHT: 112 LBS | HEIGHT: 66 IN

## 2019-08-27 DIAGNOSIS — M54.42 CHRONIC LEFT-SIDED LOW BACK PAIN WITH LEFT-SIDED SCIATICA: ICD-10-CM

## 2019-08-27 DIAGNOSIS — R31.9 URINARY TRACT INFECTION WITH HEMATURIA, SITE UNSPECIFIED: ICD-10-CM

## 2019-08-27 DIAGNOSIS — G89.29 HIP PAIN, CHRONIC, LEFT: Primary | ICD-10-CM

## 2019-08-27 DIAGNOSIS — N39.0 URINARY TRACT INFECTION WITH HEMATURIA, SITE UNSPECIFIED: ICD-10-CM

## 2019-08-27 DIAGNOSIS — G89.29 CHRONIC LEFT-SIDED LOW BACK PAIN WITH LEFT-SIDED SCIATICA: ICD-10-CM

## 2019-08-27 DIAGNOSIS — M25.552 HIP PAIN, CHRONIC, LEFT: Primary | ICD-10-CM

## 2019-08-27 LAB
BILIRUBIN, POC: ABNORMAL
BLOOD URINE, POC: ABNORMAL
CLARITY, POC: ABNORMAL
COLOR, POC: ABNORMAL
GLUCOSE URINE, POC: ABNORMAL
KETONES, POC: ABNORMAL
LEUKOCYTE EST, POC: ABNORMAL
NITRITE, POC: ABNORMAL
PH, POC: 7
PROTEIN, POC: ABNORMAL
SPECIFIC GRAVITY, POC: 1.02
UROBILINOGEN, POC: 0.2

## 2019-08-27 PROCEDURE — 81002 URINALYSIS NONAUTO W/O SCOPE: CPT | Performed by: FAMILY MEDICINE

## 2019-08-27 PROCEDURE — 99214 OFFICE O/P EST MOD 30 MIN: CPT | Performed by: FAMILY MEDICINE

## 2019-08-27 RX ORDER — NITROFURANTOIN 25; 75 MG/1; MG/1
100 CAPSULE ORAL 2 TIMES DAILY
Qty: 20 CAPSULE | Refills: 0 | Status: SHIPPED | OUTPATIENT
Start: 2019-08-27 | End: 2019-09-06

## 2019-08-27 ASSESSMENT — ENCOUNTER SYMPTOMS
ABDOMINAL PAIN: 0
BACK PAIN: 1

## 2019-08-27 NOTE — PROGRESS NOTES
Final    RBC, UA 07/26/2019 18* 0 - 4 /HPF Final    Epi Cells 07/26/2019 1  0 - 5 /HPF Final    Comment: Urinalysis microscopic performed using the  automated methodology (AUWI analyzer).        Family History   Problem Relation Age of Onset    Cancer Mother 40        ovarian cancer    Heart Disease Maternal Uncle         MI    High Blood Pressure Maternal Uncle     Cancer Maternal Grandmother     Cancer Maternal Grandfather         Prostate     Current Outpatient Medications   Medication Sig Dispense Refill    nitrofurantoin, macrocrystal-monohydrate, (MACROBID) 100 MG capsule Take 1 capsule by mouth 2 times daily for 10 days 20 capsule 0    diclofenac sodium (VOLTAREN) 1 % GEL Apply 4 g topically 4 times daily 3 Tube 1    ondansetron (ZOFRAN ODT) 4 MG disintegrating tablet Take 1 tablet by mouth every 8 hours as needed for Nausea (Patient not taking: Reported on 8/27/2019) 20 tablet 0    ibuprofen (ADVIL;MOTRIN) 600 MG tablet Take 1 tablet by mouth every 6 hours as needed for Pain (Patient not taking: Reported on 8/27/2019) 30 tablet 0    doxepin (SINEQUAN) 10 MG capsule Take 1 capsule by mouth nightly (Patient not taking: Reported on 8/27/2019) 30 capsule 3    escitalopram (LEXAPRO) 20 MG tablet TAKE ONE TABLET BY MOUTH DAILY (Patient not taking: Reported on 8/27/2019) 30 tablet 5    OXcarbazepine (TRILEPTAL) 150 MG tablet Take 1 tablet by mouth 3 times daily (Patient not taking: Reported on 8/27/2019) 90 tablet 5     Current Facility-Administered Medications   Medication Dose Route Frequency Provider Last Rate Last Dose    methylPREDNISolone acetate (DEPO-MEDROL) injection 40 mg  40 mg Intra-articular Once Beryle Face, MD        methylPREDNISolone acetate (DEPO-MEDROL) injection 20 mg  20 mg Intramuscular Once Beryle Face, MD        methylPREDNISolone acetate (DEPO-MEDROL) injection 80 mg  80 mg Intramuscular Once Beryle Face, MD         No Known Allergies    Review of Systems

## 2019-08-29 LAB — URINE CULTURE, ROUTINE: NORMAL

## 2019-09-24 ENCOUNTER — HOSPITAL ENCOUNTER (OUTPATIENT)
Age: 59
Discharge: HOME | End: 2019-09-24
Payer: COMMERCIAL

## 2019-09-24 ENCOUNTER — HOSPITAL ENCOUNTER (OUTPATIENT)
Dept: GENERAL RADIOLOGY | Age: 59
Discharge: HOME | End: 2019-09-24
Payer: COMMERCIAL

## 2019-09-24 DIAGNOSIS — G89.29 HIP PAIN, CHRONIC, LEFT: ICD-10-CM

## 2019-09-24 DIAGNOSIS — M25.552 HIP PAIN, CHRONIC, LEFT: ICD-10-CM

## 2019-09-24 DIAGNOSIS — M54.42 CHRONIC LEFT-SIDED LOW BACK PAIN WITH LEFT-SIDED SCIATICA: ICD-10-CM

## 2019-09-24 DIAGNOSIS — G89.29 CHRONIC LEFT-SIDED LOW BACK PAIN WITH LEFT-SIDED SCIATICA: ICD-10-CM

## 2019-09-24 PROCEDURE — 72100 X-RAY EXAM L-S SPINE 2/3 VWS: CPT

## 2019-09-24 PROCEDURE — 73502 X-RAY EXAM HIP UNI 2-3 VIEWS: CPT

## 2019-09-26 ENCOUNTER — TELEPHONE (OUTPATIENT)
Dept: FAMILY MEDICINE CLINIC | Age: 59
End: 2019-09-26

## 2019-10-25 ENCOUNTER — OFFICE VISIT (OUTPATIENT)
Dept: FAMILY MEDICINE CLINIC | Age: 59
End: 2019-10-25
Payer: COMMERCIAL

## 2019-10-25 VITALS
HEIGHT: 66 IN | RESPIRATION RATE: 12 BRPM | HEART RATE: 110 BPM | WEIGHT: 111 LBS | DIASTOLIC BLOOD PRESSURE: 64 MMHG | BODY MASS INDEX: 17.84 KG/M2 | OXYGEN SATURATION: 99 % | SYSTOLIC BLOOD PRESSURE: 100 MMHG

## 2019-10-25 DIAGNOSIS — Z11.59 ENCOUNTER FOR HEPATITIS C SCREENING TEST FOR LOW RISK PATIENT: ICD-10-CM

## 2019-10-25 DIAGNOSIS — M47.817 LUMBAR AND SACRAL ARTHRITIS: Primary | ICD-10-CM

## 2019-10-25 DIAGNOSIS — M18.11 DEGENERATIVE ARTHRITIS OF THUMB, RIGHT: ICD-10-CM

## 2019-10-25 DIAGNOSIS — M41.9 SCOLIOSIS OF LUMBAR SPINE, UNSPECIFIED SCOLIOSIS TYPE: ICD-10-CM

## 2019-10-25 DIAGNOSIS — E87.1 HYPONATREMIA: ICD-10-CM

## 2019-10-25 DIAGNOSIS — M70.62 TROCHANTERIC BURSITIS OF LEFT HIP: ICD-10-CM

## 2019-10-25 DIAGNOSIS — Z11.4 ENCOUNTER FOR SCREENING FOR HIV: ICD-10-CM

## 2019-10-25 DIAGNOSIS — F32.5 MAJOR DEPRESSION IN REMISSION (HCC): ICD-10-CM

## 2019-10-25 DIAGNOSIS — R56.9 SEIZURE (HCC): ICD-10-CM

## 2019-10-25 LAB
A/G RATIO: 3.5 (ref 1.1–2.2)
ALBUMIN SERPL-MCNC: 5.2 G/DL (ref 3.4–5)
ALP BLD-CCNC: 46 U/L (ref 40–129)
ALT SERPL-CCNC: 27 U/L (ref 10–40)
ANION GAP SERPL CALCULATED.3IONS-SCNC: 14 MMOL/L (ref 3–16)
AST SERPL-CCNC: 23 U/L (ref 15–37)
BILIRUB SERPL-MCNC: 0.4 MG/DL (ref 0–1)
BUN BLDV-MCNC: 14 MG/DL (ref 7–20)
CALCIUM SERPL-MCNC: 9.6 MG/DL (ref 8.3–10.6)
CHLORIDE BLD-SCNC: 102 MMOL/L (ref 99–110)
CO2: 25 MMOL/L (ref 21–32)
CREAT SERPL-MCNC: 0.8 MG/DL (ref 0.6–1.1)
GFR AFRICAN AMERICAN: >60
GFR NON-AFRICAN AMERICAN: >60
GLOBULIN: 1.5 G/DL
GLUCOSE BLD-MCNC: 101 MG/DL (ref 70–99)
HEPATITIS C ANTIBODY INTERPRETATION: NORMAL
POTASSIUM SERPL-SCNC: 4.5 MMOL/L (ref 3.5–5.1)
SODIUM BLD-SCNC: 141 MMOL/L (ref 136–145)
TOTAL PROTEIN: 6.7 G/DL (ref 6.4–8.2)

## 2019-10-25 PROCEDURE — 99214 OFFICE O/P EST MOD 30 MIN: CPT | Performed by: FAMILY MEDICINE

## 2019-10-25 RX ORDER — ESCITALOPRAM OXALATE 20 MG/1
TABLET ORAL
Qty: 30 TABLET | Refills: 2 | Status: SHIPPED | OUTPATIENT
Start: 2019-10-25 | End: 2020-01-28 | Stop reason: SDUPTHER

## 2019-10-25 RX ORDER — CELECOXIB 200 MG/1
200 CAPSULE ORAL DAILY
Qty: 30 CAPSULE | Refills: 2 | Status: SHIPPED | OUTPATIENT
Start: 2019-10-25 | End: 2021-03-11

## 2019-10-25 RX ORDER — LEVETIRACETAM 500 MG/1
500 TABLET ORAL 2 TIMES DAILY
Qty: 60 TABLET | Refills: 2 | Status: SHIPPED | OUTPATIENT
Start: 2019-10-25 | End: 2021-03-11

## 2019-10-26 LAB
HIV AG/AB: NORMAL
HIV ANTIGEN: NORMAL
HIV-1 ANTIBODY: NORMAL
HIV-2 AB: NORMAL

## 2019-10-30 ENCOUNTER — TELEPHONE (OUTPATIENT)
Dept: FAMILY MEDICINE CLINIC | Age: 59
End: 2019-10-30

## 2020-01-28 ENCOUNTER — OFFICE VISIT (OUTPATIENT)
Dept: FAMILY MEDICINE CLINIC | Age: 60
End: 2020-01-28
Payer: COMMERCIAL

## 2020-01-28 VITALS
HEIGHT: 66 IN | DIASTOLIC BLOOD PRESSURE: 68 MMHG | OXYGEN SATURATION: 99 % | BODY MASS INDEX: 18.96 KG/M2 | RESPIRATION RATE: 14 BRPM | WEIGHT: 118 LBS | SYSTOLIC BLOOD PRESSURE: 110 MMHG | HEART RATE: 108 BPM

## 2020-01-28 PROCEDURE — 99214 OFFICE O/P EST MOD 30 MIN: CPT | Performed by: FAMILY MEDICINE

## 2020-01-28 RX ORDER — ESCITALOPRAM OXALATE 20 MG/1
TABLET ORAL
Qty: 30 TABLET | Refills: 5 | Status: SHIPPED | OUTPATIENT
Start: 2020-01-28 | End: 2021-03-11

## 2020-01-28 NOTE — PROGRESS NOTES
Dr. Dorcas Beverlyundsvekelli [address], [address], [address]  Phone: [phone]    HPI:  Chief Complaint   Patient presents with    Anxiety     3 MO ANXIETY ROUTINE FOLLOW UP SHE STOPPED ALL MEDICATIONS, SHE DOES NOT WANT TO TAKE ANYTHING THAT IS BAD FOR HER     Other     SHE STOPPED ALL MEDICATIONS         Nilson Rai is a 64 y.o. female here for evaluation of anxiety. Patient states that a friend told her that she was better on lexapro. Patient notes that she has not taken keppra 500 mg for an extended period of time and states that she has not had a seizure. She states that she had hematuria in the past. Patient notes that she had a mammogram on 1/13/2020 and it was normal and she does not need one for 1 to 2 years. Patient states that she currently smokes and smokes 1 pack of cigarettes daily and has been decreasing her smoking. She notes that she has some occasional shortness of breath with anxiety when she wakes up and states that she can talk herself out of her anxiety. Patient notes that she has constant hand pain and notes that voltaren gel does not help. She states that she takes tylenol for her hand pain. Patient notes that she has pain in her knees and hips and she uses Voltaren gel on them which helps. She reports that she took seroquel in the past and notes that it was well tolerated. Patient notes that she drives. She states that she knows when a seizure is going to happen. She notes that in the far past she took depakote for her seizures. Patient states that the left side of her nose will occasionally have episodes of rhinorrhea. She reports that her throat has been scratchy. Patient notes that she has a humidifier in her house. Patient notes that she has been using theriflu for her sinus symptoms. She states that she has worn braces for her bilateral thumbs in the past for osteoarthritis of both first carpometacarpal joints.      Vitals:  BP Readings from Last 3  Diverticulosis     Liver mass     Rectal bleed     Seizures (HCC)         Past Surgical History:   Procedure Laterality Date    BRAIN SURGERY      Aneurism surgery - 2 aneurisms repaired - 1 remains    COLONOSCOPY      TUBAL LIGATION          Social History     Socioeconomic History    Marital status:      Spouse name: Not on file    Number of children: Not on file    Years of education: Not on file    Highest education level: Not on file   Occupational History    Not on file   Social Needs    Financial resource strain: Not on file    Food insecurity:     Worry: Not on file     Inability: Not on file    Transportation needs:     Medical: Not on file     Non-medical: Not on file   Tobacco Use    Smoking status: Current Every Day Smoker     Packs/day: 1.00     Years: 39.00     Pack years: 39.00     Types: Cigarettes    Smokeless tobacco: Never Used   Substance and Sexual Activity    Alcohol use:  Yes     Alcohol/week: 0.0 standard drinks     Comment: 6 pack a week    Drug use: Yes     Types: Marijuana     Comment: last time yesterday    Sexual activity: Not Currently     Partners: Male   Lifestyle    Physical activity:     Days per week: Not on file     Minutes per session: Not on file    Stress: Not on file   Relationships    Social connections:     Talks on phone: Not on file     Gets together: Not on file     Attends Worship service: Not on file     Active member of club or organization: Not on file     Attends meetings of clubs or organizations: Not on file     Relationship status: Not on file    Intimate partner violence:     Fear of current or ex partner: Not on file     Emotionally abused: Not on file     Physically abused: Not on file     Forced sexual activity: Not on file   Other Topics Concern    Not on file   Social History Narrative    Not on file        Family History   Problem Relation Age of Onset    Cancer Mother 40        ovarian cancer    Heart Disease Maternal Value Date    CHOL 189 03/19/2012    TRIG 58 03/19/2012     (H) 03/19/2012    LDLCALC 76 03/19/2012    LABVLDL 12 03/19/2012       No results found for: TSH, H6IBXGP, T9PFXSW, THYROIDAB, FT3, T4FREE     Lab Results   Component Value Date    VITD25 26.9 (L) 04/22/2016            Health Maintenance Review:  Health Maintenance Due   Topic Date Due    Pneumococcal 0-64 years Vaccine (1 of 1 - PPSV23) 03/11/1968    DTaP/Tdap/Td vaccine (1 - Tdap) 03/11/1973    Shingles Vaccine (1 of 2) 03/11/2012    Lipid screen  03/19/2017    Cervical cancer screen  04/15/2019    Flu vaccine (1) 09/01/2019         Immunizations:  Immunization History   Administered Date(s) Administered    Tetanus 07/03/2008         Assessment:   1. Hematuria due to chronic cystitis    2. Cigarette nicotine dependence without complication    3. Colon cancer screening    4. Primary osteoarthritis involving multiple joints    5. Seizure disorder ([address]    6. Mood disorder ([address]    7. Polycythemia           Plan:  Hematuria due to chronic cystitis vs. Chronic microhematuria - s/p urology evaluation/ imaging  - Stable    Cigarette nicotine dependence without complication  - Smoking cessation dw/ pt - encourage reduction/ stopping    Colon cancer screening  -     POCT Fecal Immunochemical Test (FIT);  Future    Primary osteoarthritis involving multiple joints  - Take tylenol for knee arthritis w/ diclofenac gel - helps knees more than hands  - Call Dr. Lilian Schroeder, hand surgery - Hand injections will be the next step    Seizure disorder ([address]  - She is not currently taking any seizure medications  - Discussed caution driving and other activities while off of seizure medications  -pt has been stable off meds and wants to continue    Mood disorder ([address]  -     escitalopram (LEXAPRO) 20 MG tablet; TAKE ONE TABLET BY MOUTH DAILY  - Will start on seroquel - She will call the office with the dose she took in the past     Polycythemia  - Stable - 2/2 smoking    - Continue to use over-the-counter flonase for chronic rhinitis    - Will be having a PAP smear done at the next visit    I have reviewed patient's pertinent medical history, relevant laboratory and imaging studies, and past/future health maintenance. Patient's medications have been reviewed and were discussed with the patient. Refills given today, see note below. Discussed with the patient the importance of adhering to their current medication regimen as directed. Advised the patient that they should continue to work on eating a healthy balanced diet and staying active by exercising within their personal limits. Patient was advised to keep future appointments with their respective specialty care team(s). Patient had the opportunity to ask questions, all of which were answered to the best of my ability and with patient satisfaction. Patient advised to schedule a return visit for reevaluation in 3 months. Patient understands and is agreeable with the care plan following today's visit. Patient is to schedule an appointment for any new or worsening symptoms. Requested Prescriptions     Signed Prescriptions Disp Refills    escitalopram (LEXAPRO) 20 MG tablet 30 tablet 5     Sig: TAKE ONE TABLET BY MOUTH DAILY      Orders Placed This Encounter   Procedures    POCT Fecal Immunochemical Test (FIT)     Standing Status:   Future     Standing Expiration Date:   2/28/2020           By signing my name below, I, Kaye Oscar, attest that this documentation has been prepared under the direction and in the presence of Adrian Castle MD.   Electronically Signed: Sandor Dorsey, 1/28/2020, 3:20 PM.      Emperatriz Soni MD, personally performed the services described in this documentation. All medical record entries made by the luisibgabriela were at my direction and in my presence.   I have reviewed the chart and discharge instructions (if applicable) and agree that the record reflects my personal performance and is accurate and complete.  Ramya Fong MD, 1/28/2020, 6:24 PM.

## 2020-02-26 ENCOUNTER — TELEPHONE (OUTPATIENT)
Dept: FAMILY MEDICINE CLINIC | Age: 60
End: 2020-02-26

## 2020-02-26 RX ORDER — QUETIAPINE FUMARATE 100 MG/1
100 TABLET, FILM COATED ORAL NIGHTLY
Qty: 30 TABLET | Refills: 2 | Status: SHIPPED | OUTPATIENT
Start: 2020-02-26 | End: 2021-03-11

## 2020-08-26 ENCOUNTER — PATIENT MESSAGE (OUTPATIENT)
Dept: FAMILY MEDICINE CLINIC | Age: 60
End: 2020-08-26

## 2020-10-22 NOTE — TELEPHONE ENCOUNTER
From: TAMIKO BENTON  To: Bernadette Walker  Sent: 8/26/2020 7:58 AM EDT  Subject: FIT TEST ORDER    On 1/28/20, our office gave you hemoccult cards to complete and return to our office. As of today, we have not received the hemoccult cards back in the office. Please complete the hemoccult cards and return to our office as soon as possible. If you are no longer interested in the testing, please call our office.     Ellaree Goodpasture, RMA

## 2021-03-11 ENCOUNTER — HOSPITAL ENCOUNTER (EMERGENCY)
Age: 61
Discharge: HOME | End: 2021-03-11
Attending: EMERGENCY MEDICINE
Payer: MEDICARE

## 2021-03-11 VITALS
WEIGHT: 120.59 LBS | SYSTOLIC BLOOD PRESSURE: 120 MMHG | HEIGHT: 66 IN | BODY MASS INDEX: 19.38 KG/M2 | DIASTOLIC BLOOD PRESSURE: 79 MMHG | RESPIRATION RATE: 14 BRPM | OXYGEN SATURATION: 97 % | HEART RATE: 99 BPM | TEMPERATURE: 98 F

## 2021-03-11 DIAGNOSIS — N39.0 ACUTE URINARY TRACT INFECTION: Primary | ICD-10-CM

## 2021-03-11 LAB
BACTERIA: ABNORMAL /HPF
BILIRUBIN URINE: NEGATIVE
BLOOD, URINE: ABNORMAL
CLARITY: ABNORMAL
COLOR: YELLOW
EPITHELIAL CELLS, UA: ABNORMAL /HPF (ref 0–5)
GLUCOSE URINE: NEGATIVE MG/DL
KETONES, URINE: 40 MG/DL
LEUKOCYTE ESTERASE, URINE: ABNORMAL
MICROSCOPIC EXAMINATION: YES
MUCUS: ABNORMAL /LPF
NITRITE, URINE: POSITIVE
PH UA: 5.5 (ref 5–8)
PROTEIN UA: 100 MG/DL
RBC UA: ABNORMAL /HPF (ref 0–4)
RENAL EPITHELIAL, UA: ABNORMAL /HPF (ref 0–1)
SPECIFIC GRAVITY UA: 1.02 (ref 1–1.03)
URINE REFLEX TO CULTURE: YES
URINE TYPE: ABNORMAL
UROBILINOGEN, URINE: 0.2 E.U./DL
WBC UA: >100 /HPF (ref 0–5)

## 2021-03-11 PROCEDURE — 87086 URINE CULTURE/COLONY COUNT: CPT

## 2021-03-11 PROCEDURE — 6360000002 HC RX W HCPCS: Performed by: EMERGENCY MEDICINE

## 2021-03-11 PROCEDURE — 87186 SC STD MICRODIL/AGAR DIL: CPT

## 2021-03-11 PROCEDURE — 87077 CULTURE AEROBIC IDENTIFY: CPT

## 2021-03-11 PROCEDURE — 2500000003 HC RX 250 WO HCPCS: Performed by: EMERGENCY MEDICINE

## 2021-03-11 PROCEDURE — 81001 URINALYSIS AUTO W/SCOPE: CPT

## 2021-03-11 PROCEDURE — 96372 THER/PROPH/DIAG INJ SC/IM: CPT

## 2021-03-11 PROCEDURE — 6370000000 HC RX 637 (ALT 250 FOR IP): Performed by: EMERGENCY MEDICINE

## 2021-03-11 PROCEDURE — 99284 EMERGENCY DEPT VISIT MOD MDM: CPT

## 2021-03-11 RX ORDER — IBUPROFEN 600 MG/1
600 TABLET ORAL 4 TIMES DAILY PRN
Qty: 40 TABLET | Refills: 0 | Status: SHIPPED | OUTPATIENT
Start: 2021-03-11 | End: 2021-06-03

## 2021-03-11 RX ORDER — CEFUROXIME AXETIL 250 MG/1
250 TABLET ORAL 2 TIMES DAILY
Qty: 14 TABLET | Refills: 0 | Status: SHIPPED | OUTPATIENT
Start: 2021-03-11 | End: 2021-03-18 | Stop reason: SDUPTHER

## 2021-03-11 RX ORDER — ACETAMINOPHEN 325 MG/1
650 TABLET ORAL ONCE
Status: COMPLETED | OUTPATIENT
Start: 2021-03-11 | End: 2021-03-11

## 2021-03-11 RX ORDER — ACETAMINOPHEN 325 MG/1
650 TABLET ORAL EVERY 6 HOURS PRN
Qty: 60 TABLET | Refills: 0 | Status: SHIPPED | OUTPATIENT
Start: 2021-03-11 | End: 2021-06-03

## 2021-03-11 RX ORDER — KETOROLAC TROMETHAMINE 30 MG/ML
15 INJECTION, SOLUTION INTRAMUSCULAR; INTRAVENOUS ONCE
Status: COMPLETED | OUTPATIENT
Start: 2021-03-11 | End: 2021-03-11

## 2021-03-11 RX ADMIN — KETOROLAC TROMETHAMINE 15 MG: 30 INJECTION, SOLUTION INTRAMUSCULAR at 15:20

## 2021-03-11 RX ADMIN — ACETAMINOPHEN 650 MG: 325 TABLET ORAL at 15:19

## 2021-03-11 RX ADMIN — LIDOCAINE HYDROCHLORIDE 1000 MG: 10 INJECTION, SOLUTION EPIDURAL; INFILTRATION; INTRACAUDAL; PERINEURAL at 15:34

## 2021-03-11 ASSESSMENT — ENCOUNTER SYMPTOMS
BLOOD IN STOOL: 0
SHORTNESS OF BREATH: 0
EYE PAIN: 0
COUGH: 0

## 2021-03-11 ASSESSMENT — PAIN DESCRIPTION - FREQUENCY: FREQUENCY: CONTINUOUS

## 2021-03-11 ASSESSMENT — PAIN SCALES - GENERAL
PAINLEVEL_OUTOF10: 3
PAINLEVEL_OUTOF10: 8
PAINLEVEL_OUTOF10: 8

## 2021-03-11 ASSESSMENT — PAIN DESCRIPTION - DESCRIPTORS: DESCRIPTORS: SHARP;CONSTANT;ACHING

## 2021-03-11 ASSESSMENT — PAIN DESCRIPTION - LOCATION: LOCATION: BACK;FLANK

## 2021-03-11 NOTE — ED PROVIDER NOTES
[address] ENCOUNTER      Pt Name: Marjorie Diane  MRN: [id number]  [place] 1960  Date of evaluation: 3/11/2021  Provider: Carlos Tejeda MD    CHIEF COMPLAINT       Chief Complaint   Patient presents with    Back Pain     right flank pain onset this morning, noted blood in urine, no known in jury     HISTORY OF PRESENT ILLNESS  (Location/Symptom, Timing/Onset,Context/Setting, Quality, Duration, Modifying Factors, Severity). Note limiting factors. Marjorie Diane is a 63 y.o. female who presents to the emergency department secondary to concern for back pain that goes into her right side. She states this has happened to her before when she had a urinary tract infection and kidney infection. It has not happened for a few years, she denies any known sick contacts. She states she has not been having pain when she pees though she has been peeing more frequently. At home she has not taken any medication. 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 She reports she has been eating well. She denies any fevers, chills, nausea, vomiting. She states she is sexually active, does not use protection, denies any concern for STDs. No known history of kidney stones. Past medical history noted below, significant for adrenal mass, cerebral aneurysm, depression, diverticulosis, liver mass, rectal bleed, and seizures. She currently smokes about a pack/day (is slowing down) and uses marijuana. Aside from what is stated above denies any other symptoms or modifying factors. Nursing Notes reviewed. REVIEW OF SYSTEMS  (2-9 systems for level 4, 10 or more for level 5)   Review of Systems   HENT: Negative for congestion. Eyes: Negative for pain. Respiratory: Negative for cough and shortness of breath. Cardiovascular: Negative for chest pain and leg swelling. Gastrointestinal: Negative for blood in stool.    Genitourinary: Positive for hematuria (going on for a few years, PCP is aware, has seen urologist). Skin: Negative for rash. PAST MEDICAL HISTORY     Past Medical History:   Diagnosis Date    Adrenal mass ([address]     Aneurysm, cerebral     Cerebral artery occlusion with cerebral infarction ([address]     Depression     Diverticulosis     Liver mass     Rectal bleed     Seizures (HCC)        SURGICALHISTORY       Past Surgical History:   Procedure Laterality Date    BRAIN SURGERY      Aneurism surgery - 2 aneurisms repaired - 1 remains    COLONOSCOPY      TUBAL LIGATION       CURRENT MEDICATIONS       Previous Medications    No medications on file      ALLERGIES     Patient has no known allergies. FAMILY HISTORY       Family History   Problem Relation Age of Onset    Cancer Mother 40        ovarian cancer    Heart Disease Maternal Uncle         MI    High Blood Pressure Maternal Uncle     Cancer Maternal Grandmother     Cancer Maternal Grandfather         Prostate     SOCIAL HISTORY       Social History     Socioeconomic History    Marital status:      Spouse name: None    Number of children: None    Years of education: None    Highest education level: None   Occupational History    None   Social Needs    Financial resource strain: None    Food insecurity     Worry: None     Inability: None    Transportation needs     Medical: None     Non-medical: None   Tobacco Use    Smoking status: Current Every Day Smoker     Packs/day: 1.00     Years: 39.00     Pack years: 39.00     Types: Cigarettes    Smokeless tobacco: Never Used   Substance and Sexual Activity    Alcohol use:  Yes     Alcohol/week: 0.0 standard drinks     Comment: 6 pack a week    Drug use: Yes     Types: Marijuana     Comment: last time yesterday    Sexual activity: Not Currently     Partners: Male   Lifestyle    Physical activity     Days per week: None     Minutes per session: None    Stress: None   Relationships    Social connections     Talks on phone: None     Gets together: None     Attends Tenriism service: None     Active member of club or organization: None     Attends meetings of clubs or organizations: None     Relationship status: None    Intimate partner violence     Fear of current or ex partner: None     Emotionally abused: None     Physically abused: None     Forced sexual activity: None   Other Topics Concern    None   Social History Narrative    None     SCREENINGS         PHYSICAL EXAM  (up to 7 for level 4, 8 or more for level 5)   INITIAL VITALS: BP: 109/68, Temp: 98 °F (36.7 °C), Pulse: 98, Resp: 14, SpO2: 96 %   Physical Exam  Vitals signs reviewed. Constitutional:       Appearance: She is ill-appearing (chronic). She is not toxic-appearing. HENT:      Head: Normocephalic and atraumatic. Comments: Poor dentition noted     Right Ear: External ear normal.      Left Ear: External ear normal.      Nose: Nose normal.      Mouth/Throat:      Mouth: Mucous membranes are moist.   Eyes:      General: No scleral icterus. Right eye: No discharge. Extraocular Movements: Extraocular movements intact. Conjunctiva/sclera: Conjunctivae normal.      Pupils: Pupils are equal, round, and reactive to light. Neck:      Musculoskeletal: Normal range of motion. Trachea: No tracheal deviation. Cardiovascular:      Rate and Rhythm: Normal rate. Pulses: Normal pulses. Pulmonary:      Effort: Pulmonary effort is normal. No respiratory distress. Abdominal:      Tenderness: There is no abdominal tenderness. There is right CVA tenderness. There is no left CVA tenderness or guarding. Genitourinary:     Comments: Patient declined  Musculoskeletal: Normal range of motion. Right lower leg: No edema. Left lower leg: No edema. Skin:     General: Skin is warm and dry. Capillary Refill: Capillary refill takes 2 to 3 seconds. Neurological:      General: No focal deficit present. prior to discharge. FINAL IMPRESSION      1.  Acute urinary tract infection        DISPOSITION/PLAN   DISPOSITION        PATIENT REFERRED TO:  Nataliya Paniter MD  [facility] [address]  [phone]    Schedule an appointment as soon as possible for a visit in 1 week  For follow up appointment      DISCHARGE MEDICATIONS:  New Prescriptions    ACETAMINOPHEN (TYLENOL) 325 MG TABLET    Take 2 tablets by mouth every 6 hours as needed for Pain or Fever    CEFUROXIME (CEFTIN) 250 MG TABLET    Take 1 tablet by mouth 2 times daily for 7 days    IBUPROFEN (ADVIL;MOTRIN) 600 MG TABLET    Take 1 tablet by mouth 4 times daily as needed for Pain            (Please note that portions of this note were completed with a voice recognition program. Efforts were made to edit the dictations but occasionally words are mis-transcribed.)    Sherron Tena MD (electronically signed)  Attending Emergency Physician      Sherron Tena MD  03/11/21 [id number]

## 2021-03-11 NOTE — ED NOTES
Presents with low back pain, right flank area onset this morning. No known injury. Denies pain/burning with urination. No fever, no chills. Denies nausea, no vomiting, denies abdominal pain.  Urine specimen collected     Erick Morton RN  03/11/21 0634

## 2021-03-13 LAB
ORGANISM: ABNORMAL
URINE CULTURE, ROUTINE: ABNORMAL

## 2021-03-18 ENCOUNTER — TELEPHONE (OUTPATIENT)
Dept: FAMILY MEDICINE CLINIC | Age: 61
End: 2021-03-18

## 2021-03-18 DIAGNOSIS — N39.0 ACUTE URINARY TRACT INFECTION: ICD-10-CM

## 2021-03-18 RX ORDER — CEFUROXIME AXETIL 250 MG/1
250 TABLET ORAL 2 TIMES DAILY
Qty: 6 TABLET | Refills: 0 | Status: SHIPPED | OUTPATIENT
Start: 2021-03-18 | End: 2021-03-21

## 2021-03-18 NOTE — TELEPHONE ENCOUNTER
Patient was treated at [facility] center for UTI on 3/8/21. On her last day of antibiotics and doesn't feel like it is gone. Asking if she can have an extension on the antibiotic.  Please advise

## 2021-03-18 NOTE — TELEPHONE ENCOUNTER
Culture and sensitivity testing suggests prescribed antibiotic - ceftin -should cover the bacteria that grew out in her urine.   Will extend 3 add'l days - sent to pharmacy  Hydrate well w/ water and check ua early next week if doesn't feel cleared

## 2021-03-26 ENCOUNTER — OFFICE VISIT (OUTPATIENT)
Dept: FAMILY MEDICINE CLINIC | Age: 61
End: 2021-03-26
Payer: MEDICARE

## 2021-03-26 VITALS
BODY MASS INDEX: 19.29 KG/M2 | DIASTOLIC BLOOD PRESSURE: 66 MMHG | TEMPERATURE: 97.6 F | WEIGHT: 120 LBS | SYSTOLIC BLOOD PRESSURE: 110 MMHG | HEIGHT: 66 IN

## 2021-03-26 DIAGNOSIS — R10.9 ABDOMINAL PAIN, UNSPECIFIED ABDOMINAL LOCATION: Primary | ICD-10-CM

## 2021-03-26 DIAGNOSIS — F17.210 CIGARETTE NICOTINE DEPENDENCE WITHOUT COMPLICATION: ICD-10-CM

## 2021-03-26 DIAGNOSIS — E55.9 VITAMIN D DEFICIENCY: ICD-10-CM

## 2021-03-26 DIAGNOSIS — F43.21 SITUATIONAL DEPRESSION: ICD-10-CM

## 2021-03-26 DIAGNOSIS — R39.15 URINARY URGENCY: ICD-10-CM

## 2021-03-26 DIAGNOSIS — R73.9 HYPERGLYCEMIA: ICD-10-CM

## 2021-03-26 DIAGNOSIS — Z13.220 LIPID SCREENING: ICD-10-CM

## 2021-03-26 DIAGNOSIS — Z12.11 COLON CANCER SCREENING: ICD-10-CM

## 2021-03-26 LAB
A/G RATIO: 1.7 (ref 1.1–2.2)
ALBUMIN SERPL-MCNC: 4.2 G/DL (ref 3.4–5)
ALP BLD-CCNC: 64 U/L (ref 40–129)
ALT SERPL-CCNC: 15 U/L (ref 10–40)
ANION GAP SERPL CALCULATED.3IONS-SCNC: 10 MMOL/L (ref 3–16)
AST SERPL-CCNC: 19 U/L (ref 15–37)
BILIRUB SERPL-MCNC: 0.3 MG/DL (ref 0–1)
BILIRUBIN, POC: ABNORMAL
BLOOD URINE, POC: ABNORMAL
BUN BLDV-MCNC: 18 MG/DL (ref 7–20)
CALCIUM SERPL-MCNC: 9.3 MG/DL (ref 8.3–10.6)
CHLORIDE BLD-SCNC: 102 MMOL/L (ref 99–110)
CHOLESTEROL, TOTAL: 185 MG/DL (ref 0–199)
CLARITY, POC: CLEAR
CO2: 24 MMOL/L (ref 21–32)
COLOR, POC: YELLOW
CONTROL: 0
CREAT SERPL-MCNC: 0.8 MG/DL (ref 0.6–1.1)
GFR AFRICAN AMERICAN: >60
GFR NON-AFRICAN AMERICAN: >60
GLOBULIN: 2.5 G/DL
GLUCOSE BLD-MCNC: 68 MG/DL (ref 70–99)
GLUCOSE URINE, POC: ABNORMAL
HDLC SERPL-MCNC: 60 MG/DL (ref 40–60)
HEMOCCULT STL QL: NORMAL
KETONES, POC: ABNORMAL
LDL CHOLESTEROL CALCULATED: 112 MG/DL
LEUKOCYTE EST, POC: ABNORMAL
NITRITE, POC: ABNORMAL
PH, POC: 6.5
POTASSIUM SERPL-SCNC: 4.4 MMOL/L (ref 3.5–5.1)
PROTEIN, POC: ABNORMAL
SODIUM BLD-SCNC: 136 MMOL/L (ref 136–145)
SPECIFIC GRAVITY, POC: >1.03
TOTAL PROTEIN: 6.7 G/DL (ref 6.4–8.2)
TRIGL SERPL-MCNC: 64 MG/DL (ref 0–150)
UROBILINOGEN, POC: 0.2
VITAMIN D 25-HYDROXY: 24 NG/ML
VLDLC SERPL CALC-MCNC: 13 MG/DL

## 2021-03-26 PROCEDURE — 4004F PT TOBACCO SCREEN RCVD TLK: CPT | Performed by: FAMILY MEDICINE

## 2021-03-26 PROCEDURE — G8484 FLU IMMUNIZE NO ADMIN: HCPCS | Performed by: FAMILY MEDICINE

## 2021-03-26 PROCEDURE — 99214 OFFICE O/P EST MOD 30 MIN: CPT | Performed by: FAMILY MEDICINE

## 2021-03-26 PROCEDURE — G8427 DOCREV CUR MEDS BY ELIG CLIN: HCPCS | Performed by: FAMILY MEDICINE

## 2021-03-26 PROCEDURE — 81002 URINALYSIS NONAUTO W/O SCOPE: CPT | Performed by: FAMILY MEDICINE

## 2021-03-26 PROCEDURE — G8420 CALC BMI NORM PARAMETERS: HCPCS | Performed by: FAMILY MEDICINE

## 2021-03-26 PROCEDURE — 82274 ASSAY TEST FOR BLOOD FECAL: CPT | Performed by: FAMILY MEDICINE

## 2021-03-26 PROCEDURE — 3017F COLORECTAL CA SCREEN DOC REV: CPT | Performed by: FAMILY MEDICINE

## 2021-03-26 RX ORDER — OXYBUTYNIN CHLORIDE 10 MG/1
10 TABLET, EXTENDED RELEASE ORAL DAILY
Qty: 30 TABLET | Refills: 3 | Status: SHIPPED | OUTPATIENT
Start: 2021-03-26 | End: 2021-06-03

## 2021-03-26 SDOH — ECONOMIC STABILITY: FOOD INSECURITY: WITHIN THE PAST 12 MONTHS, YOU WORRIED THAT YOUR FOOD WOULD RUN OUT BEFORE YOU GOT MONEY TO BUY MORE.: NEVER TRUE

## 2021-03-26 SDOH — ECONOMIC STABILITY: TRANSPORTATION INSECURITY
IN THE PAST 12 MONTHS, HAS THE LACK OF TRANSPORTATION KEPT YOU FROM MEDICAL APPOINTMENTS OR FROM GETTING MEDICATIONS?: NO

## 2021-03-26 SDOH — ECONOMIC STABILITY: FOOD INSECURITY: WITHIN THE PAST 12 MONTHS, THE FOOD YOU BOUGHT JUST DIDN'T LAST AND YOU DIDN'T HAVE MONEY TO GET MORE.: NEVER TRUE

## 2021-03-26 SDOH — ECONOMIC STABILITY: TRANSPORTATION INSECURITY
IN THE PAST 12 MONTHS, HAS LACK OF TRANSPORTATION KEPT YOU FROM MEETINGS, WORK, OR FROM GETTING THINGS NEEDED FOR DAILY LIVING?: NO

## 2021-03-26 NOTE — PROGRESS NOTES
[facility] Medicine  Clinic Note    Date: 3/26/2021                                               Subjective:     Chief Complaint   Patient presents with    Abdominal Pain     ABDOMIAL PAIN THINKS SHE STILL HAS UTI     Other     UA RECHECK      HPI  TENDER IN [place]  Past Surgical History:   Procedure Laterality Date    BRAIN SURGERY      Aneurism surgery - 2 aneurisms repaired - 1 remains    COLONOSCOPY      TUBAL LIGATION     FINISHED CEFTIN -EXTRA CEFTIN - UP 3-4X/ NIGHT STRONG URGENCY  NO DYSURIA - SOME PELVIC PAIN - FEELS LIKE NOT EMPTYING BLADDER  NO NAUSEA/ FLANK PAIN/ NO VAG D/C - NOT SEXUALLY ACTIVE. BM'S OKAY - occ cramps.   Trying to drink more water - likes apple/ cranberry juice - no pop/ coffee  Cutting down on cigarettes  Feeling depressed  Trying to get bad character out of life  Worried about reaction to antidepressants -doesn't want meds  Ambivalent to therapy right now - just needs to make changes in life         Patient Active Problem List    Diagnosis Date Noted    Recurrent major depressive disorder, in remission ([address] 11/07/2017    Adenoma, adrenal cortical 01/08/2016    Cigarette nicotine dependence without complication 26/22/7446    Diverticulosis of large intestine with hemorrhage 01/08/2016    Diverticulosis of intestine without bleeding 12/04/2015    Hematuria 12/04/2015    Liver mass 12/04/2015     Past Medical History:   Diagnosis Date    Adrenal mass ([address]     Aneurysm, cerebral     Cerebral artery occlusion with cerebral infarction ([address]     Depression     Diverticulosis     Liver mass     Rectal bleed     Seizures (HCC)         Past Surgical History:   Procedure Laterality Date    BRAIN SURGERY      Aneurism surgery - 2 aneurisms repaired - 1 remains    COLONOSCOPY      TUBAL LIGATION       Admission on 03/11/2021, Discharged on 03/11/2021   Component Date Value Ref Range Status    Color, UA 03/11/2021 Yellow  Straw/Yellow Final    Clarity, UA 03/11/2021 SLCLOUDY  Clear Final    Glucose, Ur 03/11/2021 Negative  Negative mg/dL Final    Bilirubin Urine 03/11/2021 Negative  Negative Final    Ketones, Urine 03/11/2021 40* Negative mg/dL Final    Specific Gravity, UA 03/11/2021 1.020  1.005 - 1.030 Final    Blood, Urine 03/11/2021 LARGE* Negative Final    pH, UA 03/11/2021 5.5  5.0 - 8.0 Final    Protein, UA 03/11/2021 100* Negative mg/dL Final    Urobilinogen, Urine 03/11/2021 0.2  <2.0 E.U./dL Final    Nitrite, Urine 03/11/2021 POSITIVE* Negative Final    Leukocyte Esterase, Urine 03/11/2021 SMALL* Negative Final    Microscopic Examination 03/11/2021 YES   Final    Urine Type 03/11/2021 Cleancatch   Final    Urine Reflex to Culture 03/11/2021 Yes   Final    Mucus, UA 03/11/2021 Rare* None Seen /LPF Final    WBC, UA 03/11/2021 >100* 0 - 5 /HPF Final    RBC, UA 03/11/2021 11-20* 0 - 4 /HPF Final    Epithelial Cells, UA 03/11/2021 0-1  0 - 5 /HPF Final    Renal Epithelial, UA 03/11/2021 0-1  0 - 1 /HPF Final    Bacteria, UA 03/11/2021 2+* None Seen /HPF Final    Organism 03/11/2021 Escherichia coli*  Final    Urine Culture, Routine 03/11/2021 >100,000 CFU/ml   Final     Family History   Problem Relation Age of Onset    Cancer Mother 40        ovarian cancer    Heart Disease Maternal Uncle         MI    High Blood Pressure Maternal Uncle     Cancer Maternal Grandmother     Cancer Maternal Grandfather         Prostate     Current Outpatient Medications   Medication Sig Dispense Refill    ibuprofen (ADVIL;MOTRIN) 600 MG tablet Take 1 tablet by mouth 4 times daily as needed for Pain 40 tablet 0    acetaminophen (TYLENOL) 325 MG tablet Take 2 tablets by mouth every 6 hours as needed for Pain or Fever 60 tablet 0     Current Facility-Administered Medications   Medication Dose Route Frequency Provider Last Rate Last Admin    methylPREDNISolone acetate (DEPO-MEDROL) injection 40 mg  40 mg Intra-articular Once Mace General, MD Hong Elizabeth methylPREDNISolone acetate (DEPO-MEDROL) injection 20 mg  20 mg Intramuscular Once Murtaza Tan MD        methylPREDNISolone acetate (DEPO-MEDROL) injection 80 mg  80 mg Intramuscular Once Murtaza Tan MD         No Known Allergies    Review of Systems    Objective:  /66 (Site: Left Upper Arm, Position: Sitting, Cuff Size: Medium Adult)   Temp 97.6 °F (36.4 °C) (Infrared)   Ht 5' 6\" (1.676 m)   Wt 120 lb (54.4 kg)   LMP  (Exact Date)   Breastfeeding No   BMI 19.37 kg/m²     BP Readings from Last 3 Encounters:   03/26/21 110/66   03/11/21 120/79   01/28/20 110/68       Pulse Readings from Last 3 Encounters:   03/11/21 99   01/28/20 108   10/25/19 110       Wt Readings from Last 3 Encounters:   03/26/21 120 lb (54.4 kg)   03/11/21 120 lb 9.5 oz (54.7 kg)   01/28/20 118 lb (53.5 kg)       Physical Exam  Constitutional:       Appearance: Normal appearance. HENT:      Head: Normocephalic. Abdominal:      General: There is no distension. Palpations: Abdomen is soft. Tenderness: There is no abdominal tenderness. Neurological:      Mental Status: She is alert. Assessment/Plan:   Jerman Stanford was seen today for abdominal pain and other. Diagnoses and all orders for this visit:    Colon cancer screening  -     POCT Fecal Immunochemical Test (FIT); Future  -     POCT Fecal Immunochemical Test (FIT)    Abdominal pain, unspecified abdominal location  -     POCT Urinalysis no Micro  -     Culture, Urine       Diagnosis Orders   1. Abdominal pain, unspecified abdominal location  POCT Urinalysis no Micro    Culture, Urine    Comprehensive Metabolic Panel   2. Colon cancer screening  POCT Fecal Immunochemical Test (FIT)    POCT Fecal Immunochemical Test (FIT)   3. Urinary urgency     4. Situational depression     5. Cigarette nicotine dependence without complication     6. Vitamin D deficiency  Vitamin D 25 Hydroxy   7. Lipid screening  Lipid Panel   8.  Hyperglycemia     pap soon  Labs today  Declines covid vaccine  Ct lungs/ mmg soon  Consider therapy  Working on life changes  Wants to hold on meds for depression - dw/ pt coping skills  Ditropan xl 10 nightly and push fluids - limit caffeine  Await cx - hold on abx for now   Blood chronic for pt - past w/u neg for hematuria  Doubt pid/ pyelo - consider u/s imaging if pain persists  Smoking cessation - reducing # of cigs /day - congrats   Fit test for colon ca screen    No follow-ups on file.     Mariza Copeland DO  3/26/2021  10:33 AM

## 2021-03-28 LAB — URINE CULTURE, ROUTINE: NORMAL

## 2021-05-26 ENCOUNTER — OFFICE VISIT (OUTPATIENT)
Dept: FAMILY MEDICINE CLINIC | Age: 61
End: 2021-05-26
Payer: MEDICARE

## 2021-05-26 VITALS — HEIGHT: 66 IN | BODY MASS INDEX: 19.13 KG/M2 | WEIGHT: 119 LBS

## 2021-05-26 DIAGNOSIS — F17.200 CURRENT SMOKER: ICD-10-CM

## 2021-05-26 DIAGNOSIS — K57.90 DIVERTICULOSIS OF INTESTINE WITHOUT BLEEDING, UNSPECIFIED INTESTINAL TRACT LOCATION: ICD-10-CM

## 2021-05-26 DIAGNOSIS — Z12.4 CERVICAL CANCER SCREENING: ICD-10-CM

## 2021-05-26 DIAGNOSIS — E55.9 VITAMIN D DEFICIENCY: ICD-10-CM

## 2021-05-26 DIAGNOSIS — D35.02 ADRENAL CORTICAL ADENOMA OF LEFT ADRENAL GLAND: ICD-10-CM

## 2021-05-26 DIAGNOSIS — Z91.89 AT HIGH RISK FOR OSTEOPOROSIS: ICD-10-CM

## 2021-05-26 DIAGNOSIS — B07.9 VIRAL WARTS, UNSPECIFIED TYPE: ICD-10-CM

## 2021-05-26 DIAGNOSIS — R73.9 HYPERGLYCEMIA: ICD-10-CM

## 2021-05-26 DIAGNOSIS — Z87.891 PERSONAL HISTORY OF TOBACCO USE: ICD-10-CM

## 2021-05-26 DIAGNOSIS — I67.1 BRAIN ANEURYSM: ICD-10-CM

## 2021-05-26 DIAGNOSIS — Z12.11 COLON CANCER SCREENING: ICD-10-CM

## 2021-05-26 DIAGNOSIS — F33.40 RECURRENT MAJOR DEPRESSIVE DISORDER, IN REMISSION (HCC): Primary | ICD-10-CM

## 2021-05-26 PROCEDURE — 4004F PT TOBACCO SCREEN RCVD TLK: CPT | Performed by: FAMILY MEDICINE

## 2021-05-26 PROCEDURE — G0296 VISIT TO DETERM LDCT ELIG: HCPCS | Performed by: FAMILY MEDICINE

## 2021-05-26 PROCEDURE — 3017F COLORECTAL CA SCREEN DOC REV: CPT | Performed by: FAMILY MEDICINE

## 2021-05-26 PROCEDURE — G8427 DOCREV CUR MEDS BY ELIG CLIN: HCPCS | Performed by: FAMILY MEDICINE

## 2021-05-26 PROCEDURE — 17110 DESTRUCTION B9 LES UP TO 14: CPT | Performed by: FAMILY MEDICINE

## 2021-05-26 PROCEDURE — 99214 OFFICE O/P EST MOD 30 MIN: CPT | Performed by: FAMILY MEDICINE

## 2021-05-26 PROCEDURE — G8420 CALC BMI NORM PARAMETERS: HCPCS | Performed by: FAMILY MEDICINE

## 2021-05-26 NOTE — PROGRESS NOTES
[facility]  Clinic Note    Date: 5/26/2021                                               Subjective:     Chief Complaint   Patient presents with    Gynecologic Exam     ANNUAL PAP SMEAR      HPI  mmg okay 4/21  Last pap normal 2016  Colonoscopy - 3/16 - overdue for colonoscopy - had multiple polyps removed  Risk for osteoporosis  Lipid/cmp okay 3/26/21 w/ vit d 24  More assymetry left >right breast - no pain in breasts  Hx of biopsy -benign right breast   No hx of abnormal pap. No gyn complaints - no dryness/ pain/ spotting/ d/c  Last period 10 or more years ago. No menopausal symptoms. Fit test negative. 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   Check wart on finger - left index - not going away w/ otc tx  Mood /sleep okay - no meds presently  Past Surgical History:   Procedure Laterality Date    BRAIN SURGERY      Aneurism surgery - 2 aneurisms repaired - 1 remains    COLONOSCOPY      TUBAL LIGATION              Patient Active Problem List    Diagnosis Date Noted    Recurrent major depressive disorder, in remission ([address] 11/07/2017    Adenoma, adrenal cortical 01/08/2016    Cigarette nicotine dependence without complication 89/77/3560    Diverticulosis of large intestine with hemorrhage 01/08/2016    Diverticulosis of intestine without bleeding 12/04/2015    Hematuria 12/04/2015    Liver mass 12/04/2015     Past Medical History:   Diagnosis Date    Adrenal mass ([address]     Aneurysm, cerebral     Cerebral artery occlusion with cerebral infarction ([address]     Depression     Diverticulosis     Liver mass     Rectal bleed     Seizures (HCC)      Past Surgical History:   Procedure Laterality Date    BRAIN SURGERY      Aneurism surgery - 2 aneurisms repaired - 1 01/28/20 108   10/25/19 110       Wt Readings from Last 3 Encounters:   05/26/21 119 lb (54 kg)   03/26/21 120 lb (54.4 kg)   03/11/21 120 lb 9.5 oz (54.7 kg)       Physical Exam  Constitutional:       General: She is not in acute distress. Appearance: She is well-developed. HENT:      Head: Normocephalic and atraumatic. Mouth/Throat:      Pharynx: No oropharyngeal exudate. Eyes:      General: No scleral icterus. Conjunctiva/sclera: Conjunctivae normal.   Neck:      Thyroid: No thyromegaly. Cardiovascular:      Rate and Rhythm: Normal rate and regular rhythm. Heart sounds: Normal heart sounds. No murmur heard. Pulmonary:      Effort: Pulmonary effort is normal. No respiratory distress. Breath sounds: Normal breath sounds. No wheezing or rales. Chest:      Breasts: Breasts are asymmetrical (left larger than right). Right: Normal. No inverted nipple, mass or skin change. Left: No inverted nipple, mass or skin change. Abdominal:      General: Bowel sounds are normal. There is no distension. Palpations: Abdomen is soft. Tenderness: There is no abdominal tenderness. Genitourinary:     Comments: Normal ext genitalia, vagina, cervix w/o d/c/ inflammation  Normal uterus/ adnexae w/o mass or ttp on bimanual  Musculoskeletal:         General: No swelling. Comments: oa changes in knuckles   Lymphadenopathy:      Cervical: No cervical adenopathy. Skin:     General: Skin is warm and dry. Comments: Small white hyperkeratotic nodule left index finger     Neurological:      Mental Status: She is alert and oriented to person, place, and time. Assessment/Plan:      Diagnosis Orders   1. Recurrent major depressive disorder, in remission ([facility]     2. Personal history of tobacco use  TX VISIT TO DISCUSS LUNG CA SCREEN W LDCT    CT Lung Screen (Annual)   3. Current smoker     4. Vitamin D deficiency     5. Hyperglycemia     6.  Diverticulosis of intestine without bleeding, unspecified intestinal tract location     7. Cervical cancer screening  PAP SMEAR   8. At high risk for osteoporosis  DEXA BONE DENSITY AXIAL SKELETON     Refer to gi for f/u polyps  Monitor adrenal nodule - stable  dexa scan  utd on mmg  Recheck aneurysm 3/22 surveillance s/p aneurysm repair  Ct of lungs encouraged  Reviewed labs w/ pt  Cont vit d supplement w/ calcium  Holding on covid vaccine - utd on tdap  shingrix interest - check on cost.  F/u pending pap  assymetric breasts - ?  Sharla syndrome - reassure w/ normal mmg  F/u pending path  Cryo to wart left index finger x2 and seb k, (itchy) on left forearm x3 - pt tolerated well  Donovan Calvillo MD, MD  5/26/2021  4:17 PM

## 2021-06-03 ENCOUNTER — APPOINTMENT (OUTPATIENT)
Dept: GENERAL RADIOLOGY | Age: 61
End: 2021-06-03
Payer: MEDICARE

## 2021-06-03 ENCOUNTER — HOSPITAL ENCOUNTER (EMERGENCY)
Age: 61
Discharge: HOME | End: 2021-06-03
Attending: EMERGENCY MEDICINE
Payer: MEDICARE

## 2021-06-03 VITALS
BODY MASS INDEX: 18.67 KG/M2 | SYSTOLIC BLOOD PRESSURE: 118 MMHG | OXYGEN SATURATION: 98 % | TEMPERATURE: 98.6 F | DIASTOLIC BLOOD PRESSURE: 74 MMHG | HEART RATE: 111 BPM | WEIGHT: 116.18 LBS | HEIGHT: 66 IN | RESPIRATION RATE: 24 BRPM

## 2021-06-03 DIAGNOSIS — N39.0 URINARY TRACT INFECTION WITH HEMATURIA, SITE UNSPECIFIED: Primary | ICD-10-CM

## 2021-06-03 DIAGNOSIS — S20.212A CONTUSION OF LEFT CHEST WALL, INITIAL ENCOUNTER: ICD-10-CM

## 2021-06-03 DIAGNOSIS — R31.9 URINARY TRACT INFECTION WITH HEMATURIA, SITE UNSPECIFIED: Primary | ICD-10-CM

## 2021-06-03 LAB
BACTERIA: ABNORMAL /HPF
BILIRUBIN URINE: NEGATIVE
BLOOD, URINE: ABNORMAL
CLARITY: ABNORMAL
COLOR: YELLOW
EPITHELIAL CELLS, UA: ABNORMAL /HPF (ref 0–5)
GLUCOSE URINE: NEGATIVE MG/DL
KETONES, URINE: 15 MG/DL
LEUKOCYTE ESTERASE, URINE: ABNORMAL
MICROSCOPIC EXAMINATION: YES
NITRITE, URINE: POSITIVE
PH UA: 6 (ref 5–8)
PROTEIN UA: 100 MG/DL
RBC UA: ABNORMAL /HPF (ref 0–4)
RENAL EPITHELIAL, UA: ABNORMAL /HPF (ref 0–1)
SPECIFIC GRAVITY UA: 1.02 (ref 1–1.03)
URINE REFLEX TO CULTURE: YES
URINE TYPE: ABNORMAL
UROBILINOGEN, URINE: 1 E.U./DL
WBC UA: ABNORMAL /HPF (ref 0–5)

## 2021-06-03 PROCEDURE — 87077 CULTURE AEROBIC IDENTIFY: CPT

## 2021-06-03 PROCEDURE — 87086 URINE CULTURE/COLONY COUNT: CPT

## 2021-06-03 PROCEDURE — 99284 EMERGENCY DEPT VISIT MOD MDM: CPT

## 2021-06-03 PROCEDURE — 81001 URINALYSIS AUTO W/SCOPE: CPT

## 2021-06-03 PROCEDURE — 87186 SC STD MICRODIL/AGAR DIL: CPT

## 2021-06-03 PROCEDURE — 71101 X-RAY EXAM UNILAT RIBS/CHEST: CPT

## 2021-06-03 RX ORDER — IBUPROFEN 800 MG/1
800 TABLET ORAL EVERY 8 HOURS PRN
Qty: 30 TABLET | Refills: 0 | Status: SHIPPED | OUTPATIENT
Start: 2021-06-03 | End: 2021-12-17

## 2021-06-03 RX ORDER — CEFUROXIME AXETIL 250 MG/1
250 TABLET ORAL 2 TIMES DAILY
Qty: 20 TABLET | Refills: 0 | Status: SHIPPED | OUTPATIENT
Start: 2021-06-03 | End: 2021-06-13

## 2021-06-03 ASSESSMENT — PAIN DESCRIPTION - LOCATION
LOCATION: BACK;RIB CAGE
LOCATION: RIB CAGE;BACK
LOCATION: BACK;RIB CAGE

## 2021-06-03 ASSESSMENT — PAIN DESCRIPTION - PAIN TYPE
TYPE: ACUTE PAIN
TYPE: ACUTE PAIN

## 2021-06-03 ASSESSMENT — PAIN DESCRIPTION - DESCRIPTORS: DESCRIPTORS: SHARP

## 2021-06-03 ASSESSMENT — PAIN SCALES - GENERAL
PAINLEVEL_OUTOF10: 8

## 2021-06-03 ASSESSMENT — PAIN DESCRIPTION - PROGRESSION
CLINICAL_PROGRESSION: NOT CHANGED
CLINICAL_PROGRESSION: NOT CHANGED
CLINICAL_PROGRESSION: GRADUALLY WORSENING

## 2021-06-03 ASSESSMENT — PAIN DESCRIPTION - ORIENTATION: ORIENTATION: LEFT

## 2021-06-03 ASSESSMENT — PAIN - FUNCTIONAL ASSESSMENT: PAIN_FUNCTIONAL_ASSESSMENT: PREVENTS OR INTERFERES SOME ACTIVE ACTIVITIES AND ADLS

## 2021-06-03 NOTE — ED TRIAGE NOTES
Patient fell on a hickory nut on 5/31/21 and landed on some garden tools. Injured her left rib cage. Also noticed disuria on 5/31/21. Concerned about a UTI because she is having some back pain now. No fever, vomiting or diarrhea.

## 2021-06-03 NOTE — ED PROVIDER NOTES
Emergency Physician Note        Note Open Time: 3:27 PM EDT    Chief Complaint  Dysuria (Noticed dysuria on 5/31/21.) and Rib Pain (injury) (Tripped on hickory nuts and fell on garden tools. Having left rib pain. Incident occurred on 5/30/21.)       History of Present Illness  Khoa Garcia is a 63 y.o. female who presents to the ED for rib pain. Patient reports that several days ago she slipped and fell injuring her left ribs and also has had some dysuria for the last few days. She denies any fevers, chills or sweats. No shortness of breath. No vomiting. She has a history of frequent UTIs and her last one was in March. It was pansensitive E. coli. 10 systems reviewed, pertinent positives per HPI otherwise noted to be negative    I have reviewed the following from the nursing documentation:      Prior to Admission medications    Not on File       Allergies as of 06/03/2021    (No Known Allergies)       Past Medical History:   Diagnosis Date    Adrenal mass ([address]     Aneurysm, cerebral     Cerebral artery occlusion with cerebral infarction ([address]     Depression     Diverticulosis     Liver mass     Rectal bleed     Seizures ([address]         Surgical History:   Past Surgical History:   Procedure Laterality Date    BRAIN SURGERY      Aneurism surgery - 2 aneurisms repaired - 1 remains    COLONOSCOPY      TUBAL LIGATION          Family History:    Family History   Problem Relation Age of Onset    Cancer Mother 40        ovarian cancer    Heart Disease Maternal Uncle         MI    High Blood Pressure Maternal Uncle     Cancer Maternal Grandmother     Cancer Maternal Grandfather         Prostate       Social History     Socioeconomic History    Marital status:       Spouse name: Not on file    Number of children: Not on file    Years of education: Not on file    Highest education level: Not on file   Occupational History    Not on file   Tobacco Use    Smoking status: Current Every Day Smoker     Packs/day: 1.00     Years: 39.00     Pack years: 39.00     Types: Cigarettes    Smokeless tobacco: Never Used   Substance and Sexual Activity    Alcohol use: Not Currently     Alcohol/week: 0.0 standard drinks    Drug use: Not Currently     Types: Marijuana    Sexual activity: Not Currently     Partners: Male   Other Topics Concern    Not on file   Social History Narrative    Not on file     Social Determinants of Health     Financial Resource Strain: Low Risk     Difficulty of Paying Living Expenses: Not hard at all   Food Insecurity: No Food Insecurity    Worried About Running Out of Food in the Last Year: Never true    John of Food in the Last Year: Never true   Transportation Needs: No Transportation Needs    Lack of Transportation (Medical): No    Lack of Transportation (Non-Medical): No   Physical Activity:     Days of Exercise per Week:     Minutes of Exercise per Session:    Stress:     Feeling of Stress :    Social Connections:     Frequency of Communication with Friends and Family:     Frequency of Social Gatherings with Friends and Family:     Attends Baptism Services:     Active Member of Clubs or Organizations:     Attends Club or Organization Meetings:     Marital Status:    Intimate Partner Violence:     Fear of Current or Ex-Partner:     Emotionally Abused:     Physically Abused:     Sexually Abused:        Nursing notes reviewed. ED Triage Vitals [06/03/21 1432]   Enc Vitals Group      /78      Pulse 112      Resp 24      Temp 98.6 °F (37 °C)      Temp Source Oral      SpO2 99 %      Weight 116 lb 2.9 oz (52.7 kg)      Height 5' 6\" (1.676 m)      Head Circumference       Peak Flow       Pain Score       Pain Loc       Pain Edu? Excl. in [address]? GENERAL:  Awake, alert. Well developed, well nourished with no apparent distress. HENT:  Normocephalic, Atraumatic, moist mucous membranes.   EYES:  Pupils equal round and reactive to light, new or worsening symptoms such as fever or shortness of breath or vomiting. I estimate there is LOW risk for PULMONARY EMBOLISM, ACUTE CORONARY SYNDROME, OR THORACIC AORTIC DISSECTION, thus I consider the discharge disposition reasonable. [address] and I have discussed the diagnosis and risks, and we agree with discharging home to follow-up with their primary doctor. We also discussed returning to the Emergency Department immediately if new or worsening symptoms occur. We have discussed the symptoms which are most concerning (e.g., bloody sputum, fever, worsening pain or shortness of breath, vomiting) that necessitate immediate return. FINAL Impression    1. Urinary tract infection with hematuria, site unspecified    2. Contusion of left chest wall, initial encounter        Blood pressure 118/74, pulse 111, temperature 98.6 °F (37 °C), temperature source Oral, resp. rate 24, height 5' 6\" (1.676 m), weight 116 lb 2.9 oz (52.7 kg), SpO2 98 %, not currently breastfeeding. Patient was given scripts for the following medications. I counseled patient how to take these medications. Discharge Medication List as of 6/3/2021  3:31 PM      START taking these medications    Details   cefUROXime (CEFTIN) 250 MG tablet Take 1 tablet by mouth 2 times daily for 10 days, Disp-20 tablet, R-0Normal             Disposition  Pt is in good condition upon Discharge to home. This chart was generated using the [address] [place] [address] dictation system. I created this record but it may contain dictation errors.           Antony Farrar MD  06/03/21 Pratibha Blackwell

## 2021-06-05 LAB
ORGANISM: ABNORMAL
URINE CULTURE, ROUTINE: ABNORMAL

## 2021-12-17 ENCOUNTER — APPOINTMENT (OUTPATIENT)
Dept: CT IMAGING | Age: 61
End: 2021-12-17
Payer: MEDICARE

## 2021-12-17 ENCOUNTER — HOSPITAL ENCOUNTER (EMERGENCY)
Age: 61
Discharge: HOME | End: 2021-12-18
Attending: EMERGENCY MEDICINE
Payer: MEDICARE

## 2021-12-17 VITALS
WEIGHT: 118.83 LBS | RESPIRATION RATE: 16 BRPM | DIASTOLIC BLOOD PRESSURE: 65 MMHG | OXYGEN SATURATION: 95 % | BODY MASS INDEX: 19.8 KG/M2 | HEIGHT: 65 IN | TEMPERATURE: 98 F | SYSTOLIC BLOOD PRESSURE: 108 MMHG | HEART RATE: 96 BPM

## 2021-12-17 DIAGNOSIS — R10.30 LOWER ABDOMINAL PAIN: Primary | ICD-10-CM

## 2021-12-17 LAB
A/G RATIO: 1.4 (ref 1.1–2.2)
ALBUMIN SERPL-MCNC: 4 G/DL (ref 3.4–5)
ALP BLD-CCNC: 77 U/L (ref 40–129)
ALT SERPL-CCNC: 22 U/L (ref 10–40)
ANION GAP SERPL CALCULATED.3IONS-SCNC: 7 MMOL/L (ref 3–16)
AST SERPL-CCNC: 18 U/L (ref 15–37)
BACTERIA: ABNORMAL /HPF
BASOPHILS ABSOLUTE: 0.1 K/UL (ref 0–0.2)
BASOPHILS RELATIVE PERCENT: 0.8 %
BILIRUB SERPL-MCNC: <0.2 MG/DL (ref 0–1)
BILIRUBIN URINE: NEGATIVE
BLOOD, URINE: ABNORMAL
BUN BLDV-MCNC: 19 MG/DL (ref 7–20)
CALCIUM SERPL-MCNC: 9.4 MG/DL (ref 8.3–10.6)
CHLORIDE BLD-SCNC: 104 MMOL/L (ref 99–110)
CLARITY: ABNORMAL
CO2: 28 MMOL/L (ref 21–32)
COLOR: YELLOW
CREAT SERPL-MCNC: 0.9 MG/DL (ref 0.6–1.1)
EOSINOPHILS ABSOLUTE: 0.7 K/UL (ref 0–0.6)
EOSINOPHILS RELATIVE PERCENT: 7.7 %
EPITHELIAL CELLS, UA: ABNORMAL /HPF (ref 0–5)
GFR AFRICAN AMERICAN: >60
GFR NON-AFRICAN AMERICAN: >60
GLUCOSE BLD-MCNC: 71 MG/DL (ref 70–99)
GLUCOSE URINE: NEGATIVE MG/DL
HCT VFR BLD CALC: 41.1 % (ref 36–48)
HEMOGLOBIN: 13.6 G/DL (ref 12–16)
KETONES, URINE: NEGATIVE MG/DL
LACTIC ACID, SEPSIS: 0.8 MMOL/L (ref 0.4–1.9)
LEUKOCYTE ESTERASE, URINE: NEGATIVE
LYMPHOCYTES ABSOLUTE: 2.1 K/UL (ref 1–5.1)
LYMPHOCYTES RELATIVE PERCENT: 21.6 %
MCH RBC QN AUTO: 31.2 PG (ref 26–34)
MCHC RBC AUTO-ENTMCNC: 33.2 G/DL (ref 31–36)
MCV RBC AUTO: 94.1 FL (ref 80–100)
MICROSCOPIC EXAMINATION: YES
MONOCYTES ABSOLUTE: 1.1 K/UL (ref 0–1.3)
MONOCYTES RELATIVE PERCENT: 11.8 %
NEUTROPHILS ABSOLUTE: 5.7 K/UL (ref 1.7–7.7)
NEUTROPHILS RELATIVE PERCENT: 58.1 %
NITRITE, URINE: NEGATIVE
PDW BLD-RTO: 13.6 % (ref 12.4–15.4)
PH UA: 6 (ref 5–8)
PLATELET # BLD: 315 K/UL (ref 135–450)
PMV BLD AUTO: 7.2 FL (ref 5–10.5)
POTASSIUM REFLEX MAGNESIUM: 4.7 MMOL/L (ref 3.5–5.1)
PROTEIN UA: NEGATIVE MG/DL
RBC # BLD: 4.36 M/UL (ref 4–5.2)
RBC UA: ABNORMAL /HPF (ref 0–4)
SODIUM BLD-SCNC: 139 MMOL/L (ref 136–145)
SPECIFIC GRAVITY UA: 1.02 (ref 1–1.03)
TOTAL PROTEIN: 6.9 G/DL (ref 6.4–8.2)
URINE REFLEX TO CULTURE: ABNORMAL
URINE TYPE: ABNORMAL
UROBILINOGEN, URINE: 0.2 E.U./DL
WBC # BLD: 9.7 K/UL (ref 4–11)
WBC UA: ABNORMAL /HPF (ref 0–5)

## 2021-12-17 PROCEDURE — 81001 URINALYSIS AUTO W/SCOPE: CPT

## 2021-12-17 PROCEDURE — 6360000004 HC RX CONTRAST MEDICATION: Performed by: EMERGENCY MEDICINE

## 2021-12-17 PROCEDURE — 74177 CT ABD & PELVIS W/CONTRAST: CPT

## 2021-12-17 PROCEDURE — 80053 COMPREHEN METABOLIC PANEL: CPT

## 2021-12-17 PROCEDURE — 99283 EMERGENCY DEPT VISIT LOW MDM: CPT

## 2021-12-17 PROCEDURE — 85025 COMPLETE CBC W/AUTO DIFF WBC: CPT

## 2021-12-17 PROCEDURE — 36415 COLL VENOUS BLD VENIPUNCTURE: CPT

## 2021-12-17 PROCEDURE — 83605 ASSAY OF LACTIC ACID: CPT

## 2021-12-17 PROCEDURE — 6370000000 HC RX 637 (ALT 250 FOR IP): Performed by: EMERGENCY MEDICINE

## 2021-12-17 RX ORDER — CEPHALEXIN 500 MG/1
500 CAPSULE ORAL ONCE
Status: COMPLETED | OUTPATIENT
Start: 2021-12-17 | End: 2021-12-17

## 2021-12-17 RX ORDER — CEPHALEXIN 500 MG/1
500 CAPSULE ORAL 3 TIMES DAILY
Qty: 21 CAPSULE | Refills: 0 | Status: SHIPPED | OUTPATIENT
Start: 2021-12-17 | End: 2021-12-24

## 2021-12-17 RX ORDER — 0.9 % SODIUM CHLORIDE 0.9 %
1000 INTRAVENOUS SOLUTION INTRAVENOUS ONCE
Status: DISCONTINUED | OUTPATIENT
Start: 2021-12-17 | End: 2021-12-17

## 2021-12-17 RX ADMIN — CEPHALEXIN 500 MG: 500 CAPSULE ORAL at 19:56

## 2021-12-17 RX ADMIN — IOPAMIDOL 100 ML: 755 INJECTION, SOLUTION INTRAVENOUS at 18:38

## 2021-12-17 ASSESSMENT — PAIN DESCRIPTION - LOCATION: LOCATION: FLANK

## 2021-12-17 ASSESSMENT — PAIN DESCRIPTION - DESCRIPTORS: DESCRIPTORS: ACHING

## 2021-12-17 ASSESSMENT — ENCOUNTER SYMPTOMS
SHORTNESS OF BREATH: 0
RHINORRHEA: 0
COUGH: 0
PHOTOPHOBIA: 0
VOMITING: 0
ABDOMINAL DISTENTION: 0
NAUSEA: 0
WHEEZING: 0
BACK PAIN: 0

## 2021-12-17 ASSESSMENT — PAIN DESCRIPTION - ORIENTATION: ORIENTATION: LEFT

## 2021-12-17 ASSESSMENT — PAIN - FUNCTIONAL ASSESSMENT: PAIN_FUNCTIONAL_ASSESSMENT: 0-10

## 2021-12-17 ASSESSMENT — PAIN DESCRIPTION - PAIN TYPE: TYPE: ACUTE PAIN

## 2021-12-17 ASSESSMENT — PAIN SCALES - GENERAL
PAINLEVEL_OUTOF10: 2
PAINLEVEL_OUTOF10: 4

## 2021-12-17 NOTE — ED PROVIDER NOTES
Emergency Department Provider Note  Location: [address] [place] [address]  12/17/2021     Patient Identification  Dick Dior is a 63 y.o. female    Chief Complaint  Urinary Tract Infection (left flank pain radiates to left groin area, slight burning on urination onset yesterday)          HPI  (History provided by patient)  Patient is a 57-year-old female who presents with an of \"urinary tract infection\". Patient describes a pain suprapubically that radiates to the left lower quadrant and left side. She had one episode of watery stools yesterday. No fever no chills. She endorses dysuria and urinary frequency. She reports recurrent UTIs in the past.  She has chronic hematuria and is followed with urology for this in the past.  She denies any other pelvic pain abnormal discharge or bleeding or history of STDs. She is not sexually active. No exacerbating relieving factors otherwise. I have reviewed the following nursing documentation:  Allergies: No Known Allergies    Past medical history:  has a past medical history of Adrenal mass ([facility] Aneurysm, cerebral, Cerebral artery occlusion with cerebral infarction [facility]), Depression, Diverticulosis, Liver mass, Rectal bleed, and Seizures ([facility] Past surgical history:  has a past surgical history that includes Tubal ligation; brain surgery; Colonoscopy; and Breast surgery (Right). Home medications:   Prior to Admission medications    Not on File       Social history:  reports that she has been smoking cigarettes. She has a 29.25 pack-year smoking history. She has never used smokeless tobacco. She reports previous alcohol use. She reports previous drug use. Drug: Marijuana Berneta Kamlesh).     Family history:    Family History   Problem Relation Age of Onset    Cancer Mother 40        ovarian cancer    Heart Disease Maternal Uncle         MI    High Blood Pressure Maternal Uncle     Cancer Maternal Grandmother     Cancer Maternal Grandfather Prostate         ROS  Review of Systems   Constitutional: Negative for chills and fever. HENT: Negative for congestion and rhinorrhea. Eyes: Negative for photophobia and visual disturbance. Respiratory: Negative for cough, shortness of breath and wheezing. Cardiovascular: Negative for chest pain and palpitations. Gastrointestinal: Negative for abdominal distention, nausea and vomiting. Genitourinary: Positive for dysuria and frequency. Negative for hematuria, vaginal bleeding, vaginal discharge and vaginal pain. Musculoskeletal: Negative for back pain and neck pain. Skin: Negative for rash and wound. Neurological: Negative for syncope and weakness. Psychiatric/Behavioral: Negative for agitation and confusion. Exam  ED Triage Vitals   BP Temp Temp src Pulse Resp SpO2 Height Weight   -- -- -- -- -- -- -- --       Physical Exam  Vitals and nursing note reviewed. Constitutional:       General: She is not in acute distress. Appearance: She is well-developed. HENT:      Head: Normocephalic and atraumatic. Nose: Nose normal. No congestion. Eyes:      Conjunctiva/sclera: Conjunctivae normal.      Pupils: Pupils are equal, round, and reactive to light. Cardiovascular:      Rate and Rhythm: Normal rate and regular rhythm. Heart sounds: No murmur heard. Pulmonary:      Effort: Pulmonary effort is normal.      Breath sounds: Normal breath sounds. Abdominal:      General: There is no distension. Palpations: Abdomen is soft. Comments: Tenderness left lower quadrant and suprapubically without guarding or rebound, equivocal left-sided CVA tenderness   Musculoskeletal:         General: No deformity. Normal range of motion. Cervical back: Normal range of motion and neck supple. Skin:     General: Skin is warm. Findings: No rash. Neurological:      Mental Status: She is alert and oriented to person, place, and time.       Motor: No abnormal muscle tone.      Coordination: Coordination normal.   Psychiatric:         Mood and Affect: Mood normal.         Behavior: Behavior normal.           ED Course    ED Medication Orders (From admission, onward)    Start Ordered     Status Ordering Provider    12/17/21 1915 12/17/21 1904  0.9 % sodium chloride bolus  ONCE         Ordered DEL RANGEL KAREN    12/17/21 1746 12/17/21 1746  iopamidol (ISOVUE-370) 76 % injection 100 mL  IMG ONCE PRN         Last MAR action: Given - by Karuna Lopez on 12/17/21 at [facility]            Radiology  CT ABDOMEN PELVIS W IV CONTRAST Additional Contrast? None    Result Date: 12/17/2021  EXAMINATION: CT OF THE ABDOMEN AND PELVIS WITH CONTRAST 12/17/2021 5:48 pm TECHNIQUE: CT of the abdomen and pelvis was performed with the administration of intravenous contrast. Multiplanar reformatted images are provided for review. Dose modulation, iterative reconstruction, and/or weight based adjustment of the mA/kV was utilized to reduce the radiation dose to as low as reasonably achievable. COMPARISON: 07/26/2019. HISTORY: ORDERING SYSTEM PROVIDED HISTORY: lower abd pain TECHNOLOGIST PROVIDED HISTORY: Additional Contrast?->None Reason for exam:->lower abd pain Decision Support Exception - unselect if not a suspected or confirmed emergency medical condition->Emergency Medical Condition ([place]) Reason for Exam: left flank pain radiates to left groin area, slight burning on urination onset yesterday Relevant Medical/Surgical History: tubal ligation, adrenal adenoma, liver mass, diverticulosis FINDINGS: Lower Chest: No acute infiltrate at the lung bases. Organs: The liver, spleen, pancreas and right adrenal gland are unremarkable. Left adrenal nodule measuring 1.3 x 1.8 cm is indeterminate with attenuation values of 67 but stable. No renal mass or significant hydronephrosis. Two small right renal cysts are noted. GI/Bowel: Colonic diverticulosis with no evidence of diverticulitis.   There is no evidence of appendicitis. No significant small bowel distension. The stomach and duodenal sweep are unremarkable. Pelvis: No pelvic mass or free pelvic fluid. The uterus and adnexal structures are unremarkable. Mild distention of the urinary bladder. Peritoneum/Retroperitoneum: The abdominal aorta is normal in caliber. Mild calcified aortic plaque. No retroperitoneal adenopathy or upper abdominal ascites. Bones/Soft Tissues: No acute osseous or soft tissue abnormality. Degenerative disc disease at L5-S1. Mild levoscoliosis centered at L2.     1. No acute findings within the abdomen or pelvis. Colonic diverticulosis with no acute features. No evidence of appendicitis. 2. Stable left adrenal nodule. The lesion was consistent with an a adenoma on previous MRI in 2017. No imaging follow-up indicated.          Labs  Results for orders placed or performed during the hospital encounter of 12/17/21   Urinalysis Reflex to Culture    Specimen: Urine, clean catch   Result Value Ref Range    Color, UA Yellow Straw/Yellow    Clarity, UA SLCLOUDY Clear    Glucose, Ur Negative Negative mg/dL    Bilirubin Urine Negative Negative    Ketones, Urine Negative Negative mg/dL    Specific Gravity, UA 1.025 1.005 - 1.030    Blood, Urine MODERATE (A) Negative    pH, UA 6.0 5.0 - 8.0    Protein, UA Negative Negative mg/dL    Urobilinogen, Urine 0.2 <2.0 E.U./dL    Nitrite, Urine Negative Negative    Leukocyte Esterase, Urine Negative Negative    Microscopic Examination YES     Urine Type Voided     Urine Reflex to Culture Not Indicated    Microscopic Urinalysis   Result Value Ref Range    WBC, UA 6-9 (A) 0 - 5 /HPF    RBC, UA 11-20 (A) 0 - 4 /HPF    Epithelial Cells, UA 6-10 (A) 0 - 5 /HPF    Bacteria, UA 1+ (A) None Seen /HPF   CBC Auto Differential   Result Value Ref Range    WBC 9.7 4.0 - 11.0 K/uL    RBC 4.36 4.00 - 5.20 M/uL    Hemoglobin 13.6 12.0 - 16.0 g/dL    Hematocrit 41.1 36.0 - 48.0 %    MCV 94.1 80.0 - 100.0 fL MCH 31.2 26.0 - 34.0 pg    MCHC 33.2 31.0 - 36.0 g/dL    RDW 13.6 12.4 - 15.4 %    Platelets 218 608 - 019 K/uL    MPV 7.2 5.0 - 10.5 fL    Neutrophils % 58.1 %    Lymphocytes % 21.6 %    Monocytes % 11.8 %    Eosinophils % 7.7 %    Basophils % 0.8 %    Neutrophils Absolute 5.7 1.7 - 7.7 K/uL    Lymphocytes Absolute 2.1 1.0 - 5.1 K/uL    Monocytes Absolute 1.1 0.0 - 1.3 K/uL    Eosinophils Absolute 0.7 (H) 0.0 - 0.6 K/uL    Basophils Absolute 0.1 0.0 - 0.2 K/uL   Comprehensive Metabolic Panel w/ Reflex to MG   Result Value Ref Range    Sodium 139 136 - 145 mmol/L    Potassium reflex Magnesium 4.7 3.5 - 5.1 mmol/L    Chloride 104 99 - 110 mmol/L    CO2 28 21 - 32 mmol/L    Anion Gap 7 3 - 16    Glucose 71 70 - 99 mg/dL    BUN 19 7 - 20 mg/dL    CREATININE 0.9 0.6 - 1.1 mg/dL    GFR Non-African American >60 >60    GFR African American >60 >60    Calcium 9.4 8.3 - 10.6 mg/dL    Total Protein 6.9 6.4 - 8.2 g/dL    Albumin 4.0 3.4 - 5.0 g/dL    Albumin/Globulin Ratio 1.4 1.1 - 2.2    Total Bilirubin <0.2 0.0 - 1.0 mg/dL    Alkaline Phosphatase 77 40 - 129 U/L    ALT 22 10 - 40 U/L    AST 18 15 - 37 U/L   Lactate, Sepsis   Result Value Ref Range    Lactic Acid, Sepsis 0.8 0.4 - 1.9 mmol/L         [facility]  Patient seen and evaluated. Relevant records reviewed. 77-year-old female presents with dysuria and urinary frequency and lower abdominal pain. Well-appearing on exam her vitals are notable for tachycardia in the 110s which improved spontaneously. Her exam is notable for tenderness in the left lower quadrant and to some degree the left flank area with no guarding or rebound. Her urinalysis does not show a clear UTI therefore her work-up was broadened including the lab work and CT abdomen pelvis to further assess for any infectious process or other etiology. Patient has declined pelvic exam and denies any pelvic symptoms do not see indication to pursue this further. Her labs not show any notable derangements.   CT does not show any acute pathology. Noted incidental adrenal nodule should be discussed with patient. I do not see indication for further work-up at this time. Discussed repeat urine testing and reevaluation with primary doctor. Discussed return precautions. Patient agreeable to plan expressed understanding plan. Clinical Impression:  1. Lower abdominal pain        Blood pressure 108/65, pulse 96, temperature 98 °F (36.7 °C), temperature source Oral, resp. rate 16, height 5' 5\" (1.651 m), weight 118 lb 13.3 oz (53.9 kg), SpO2 95 %, not currently breastfeeding. Patient was given scripts for the following medications. I counseled patient how to take these medications. New Prescriptions    No medications on file       Disposition referral (if applicable):  Luis Fernando Robles MD  [address] [place] [address] [address]  [phone]    In 2 days          Total critical care time is 0 minutes, which excludes separately billable procedures and updating family. Time spent is specifically for management of the presenting complaint and symptoms initially, direct bedside care, reevaluation, review of records, and consultation. There was a high probability of clinically significant life-threatening deterioration in the patient's condition, which required my urgent intervention. This chart was generated in part by using Dragon Dictation system and may contain errors related to that system including errors in grammar, punctuation, and spelling, as well as words and phrases that may be inappropriate. If there are any questions or concerns please feel free to contact the dictating provider for clarification.      Wilian Medina MD  [address] Fore[address] MD  12/17/21 [address] Martinez MD  12/17/21 Bhavin Farrell

## 2022-12-13 ENCOUNTER — APPOINTMENT (OUTPATIENT)
Dept: GENERAL RADIOLOGY | Age: 62
End: 2022-12-13
Payer: COMMERCIAL

## 2022-12-13 ENCOUNTER — HOSPITAL ENCOUNTER (EMERGENCY)
Age: 62
Discharge: HOME | End: 2022-12-13
Attending: EMERGENCY MEDICINE
Payer: COMMERCIAL

## 2022-12-13 VITALS
HEIGHT: 65 IN | BODY MASS INDEX: 21.05 KG/M2 | HEART RATE: 109 BPM | WEIGHT: 126.32 LBS | OXYGEN SATURATION: 93 % | SYSTOLIC BLOOD PRESSURE: 154 MMHG | RESPIRATION RATE: 18 BRPM | TEMPERATURE: 98.6 F | DIASTOLIC BLOOD PRESSURE: 82 MMHG

## 2022-12-13 DIAGNOSIS — J20.9 ACUTE BRONCHITIS, UNSPECIFIED ORGANISM: Primary | ICD-10-CM

## 2022-12-13 LAB
RAPID INFLUENZA  B AGN: NEGATIVE
RAPID INFLUENZA A AGN: NEGATIVE
SARS-COV-2, NAAT: NOT DETECTED

## 2022-12-13 PROCEDURE — 87635 SARS-COV-2 COVID-19 AMP PRB: CPT

## 2022-12-13 PROCEDURE — 99284 EMERGENCY DEPT VISIT MOD MDM: CPT

## 2022-12-13 PROCEDURE — 71045 X-RAY EXAM CHEST 1 VIEW: CPT

## 2022-12-13 PROCEDURE — 6370000000 HC RX 637 (ALT 250 FOR IP): Performed by: EMERGENCY MEDICINE

## 2022-12-13 PROCEDURE — 87804 INFLUENZA ASSAY W/OPTIC: CPT

## 2022-12-13 RX ORDER — BENZONATATE 100 MG/1
100 CAPSULE ORAL 2 TIMES DAILY PRN
Qty: 14 CAPSULE | Refills: 0 | Status: SHIPPED | OUTPATIENT
Start: 2022-12-13 | End: 2022-12-20

## 2022-12-13 RX ORDER — PREDNISONE 20 MG/1
60 TABLET ORAL ONCE
Status: COMPLETED | OUTPATIENT
Start: 2022-12-13 | End: 2022-12-13

## 2022-12-13 RX ORDER — PREDNISONE 50 MG/1
50 TABLET ORAL DAILY
Qty: 5 TABLET | Refills: 0 | Status: SHIPPED | OUTPATIENT
Start: 2022-12-13 | End: 2022-12-18

## 2022-12-13 RX ORDER — BENZONATATE 100 MG/1
100 CAPSULE ORAL ONCE
Status: COMPLETED | OUTPATIENT
Start: 2022-12-13 | End: 2022-12-13

## 2022-12-13 RX ORDER — ALBUTEROL SULFATE 90 UG/1
2 AEROSOL, METERED RESPIRATORY (INHALATION) 4 TIMES DAILY PRN
Qty: 18 G | Refills: 0 | Status: SHIPPED | OUTPATIENT
Start: 2022-12-13

## 2022-12-13 RX ADMIN — BENZONATATE 100 MG: 100 CAPSULE ORAL at 18:49

## 2022-12-13 RX ADMIN — PREDNISONE 60 MG: 20 TABLET ORAL at 18:49

## 2022-12-13 ASSESSMENT — ENCOUNTER SYMPTOMS
DIARRHEA: 0
TROUBLE SWALLOWING: 0
WHEEZING: 1
VOMITING: 0
SHORTNESS OF BREATH: 0
NAUSEA: 0
COUGH: 1
VOICE CHANGE: 0

## 2022-12-13 ASSESSMENT — PAIN - FUNCTIONAL ASSESSMENT: PAIN_FUNCTIONAL_ASSESSMENT: 0-10

## 2022-12-13 NOTE — ED NOTES
Discharge instructions reviewed. Patient verbalized understanding.   Prescription x3 sent to pharmacy     Anju Hess RN  12/13/22 1395

## 2022-12-13 NOTE — ED PROVIDER NOTES
[facility]  eMERGENCY dEPARTMENT eNCOUnter      Pt Name: Jon Turner  MRN: [id number]  [place] 1960  Date of evaluation: 12/13/2022  Provider: Alecia Morel MD    [address] [place] [address]       Chief Complaint   Patient presents with    Cough     Since Sunday. Patient stated productive cough yellow green sputum. HISTORY OF PRESENT ILLNESS   (Location/Symptom, Timing/Onset, Context/Setting, Quality, Duration, Modifying Factors, Severity)  Note limiting factors. Jon Turner is a 63 y.o. female who denies any history of COPD or chronic respiratory disease but does report a smoking history who reports with 2 to 3 days of cough productive of yellow and green sputum. Patient has any fever hemoptysis or leg swelling. Patient reports symptoms are moderate constant unchanged with no known aggravating or alleviating factors. HPI    Nursing Notes were reviewed. REVIEW OFSYSTEMS    (2-9 systems for level 4, 10 or more for level 5)     Review of Systems   Constitutional:  Negative for appetite change and fever. HENT:  Negative for trouble swallowing and voice change. Eyes:  Negative for visual disturbance. Respiratory:  Positive for cough and wheezing. Negative for shortness of breath. Cardiovascular:  Negative for chest pain and palpitations. Gastrointestinal:  Negative for diarrhea, nausea and vomiting. Genitourinary:  Negative for dysuria. Musculoskeletal:  Negative for gait problem. Neurological:  Negative for seizures and syncope. Psychiatric/Behavioral:  Negative for self-injury and suicidal ideas. Except as noted above the remainder of the review of systems was reviewed and negative.        PAST MEDICAL HISTORY     Past Medical History:   Diagnosis Date    [facility]     Aneurysm, cerebral     Cerebral artery occlusion with cerebral infarction [facility])     Depression     Diverticulosis     Liver mass     Rectal bleed     Seizures ([facility] normal. No respiratory distress. Breath sounds: Wheezing present. Comments: Mild scant and expiratory wheezing bilaterally. No rhonchi. Patient speaking full sentences with no marielle respiratory distress  Abdominal:      Tenderness: There is no abdominal tenderness. There is no rebound. Musculoskeletal:         General: No deformity. Comments: No lower extremity swelling, tenderness, or palpable cord. Negative Kris test bilaterally. Neurological:      Mental Status: She is alert. DIAGNOSTIC RESULTS       RADIOLOGY:     Interpretation per the Radiologist below, if available at the time of this note:    XR CHEST PORTABLE   Preliminary Result   No acute cardiopulmonary abnormality is identified. LABS:  Labs Reviewed   RAPID INFLUENZA A/B ANTIGENS   COVID-19, RAPID       All otherlabs were within normal range or not returned as of this dictation. EMERGENCY DEPARTMENT COURSE and DIFFERENTIAL DIAGNOSIS/MDM:   Vitals:    Vitals:    12/13/22 1733   BP: (!) 154/82   Pulse: (!) 109   Resp: 18   Temp: 98.6 °F (37 °C)   TempSrc: Oral   SpO2: 93%   Weight: 126 lb 5.2 oz (57.3 kg)   Height: 5' 5\" (1.651 m)       MDM  Patient is afebrile, nontoxic-appearing, no acute distress. Chest x-rays not show any evidence of cardiopulmonary pathology. I considered occult pneumonia, pulmonary embolism, influenza, other emergent follow do not feel this is likely based on patient's vitals, history, and physical exam.  I feel the patient likely has acute bronchitis and would benefit from inhaler, steroids, and Tessalon Perles for symptomatic relief. Antibiotics are not indicated according to current CDC guidelines. The patient is reevaluated after steroids and a breathing treatment and reports improvement in their symptoms and their lungs are clear to auscultation on reevaluation.   Standard ER return precautions including but not limited to fever, change in sputum, worsening shortness of breath, altered mental status, chest pain, or other concerns are discussed with the patient. Primary care follow-up is recommended. The patient expresses understanding and agreement with this plan and is discharged home. Procedures    FINAL IMPRESSION      1. Acute bronchitis, unspecified organism          DISPOSITION/PLAN   DISPOSITION Decision To Discharge 12/13/2022 06:34:05 PM      PATIENT REFERRED TO:  Rashad Barbosa MD  [address] [place] [address]  [phone]    In 3 days      [address] [address]  [phone]    If symptoms worsen      MEDICATIONS:  New Prescriptions    ALBUTEROL SULFATE HFA (VENTOLIN HFA) 108 (90 BASE) MCG/ACT INHALER    Inhale 2 puffs into the lungs 4 times daily as needed for Wheezing    BENZONATATE (TESSALON PERLES) 100 MG CAPSULE    Take 1 capsule by mouth 2 times daily as needed for Cough    PREDNISONE (DELTASONE) 50 MG TABLET    Take 1 tablet by mouth daily for 5 days          (Please note that portions of this note were completed with a voice recognition program.  Efforts were made to edit the dictations but occasionally words aremis-transcribed. )    Christofer Dai MD (electronically signed)  Attending Emergency Physician           Christofer Dai MD  12/13/22 2774

## 2023-03-14 ENCOUNTER — APPOINTMENT (OUTPATIENT)
Dept: GENERAL RADIOLOGY | Age: 63
End: 2023-03-14
Payer: COMMERCIAL

## 2023-03-14 ENCOUNTER — HOSPITAL ENCOUNTER (EMERGENCY)
Age: 63
Discharge: HOME | End: 2023-03-14
Attending: EMERGENCY MEDICINE
Payer: COMMERCIAL

## 2023-03-14 VITALS
DIASTOLIC BLOOD PRESSURE: 77 MMHG | OXYGEN SATURATION: 98 % | TEMPERATURE: 98.1 F | BODY MASS INDEX: 21.19 KG/M2 | SYSTOLIC BLOOD PRESSURE: 145 MMHG | HEIGHT: 65 IN | WEIGHT: 127.21 LBS | RESPIRATION RATE: 16 BRPM | HEART RATE: 97 BPM

## 2023-03-14 DIAGNOSIS — S20.219A STERNAL CONTUSION, INITIAL ENCOUNTER: Primary | ICD-10-CM

## 2023-03-14 PROCEDURE — 71046 X-RAY EXAM CHEST 2 VIEWS: CPT

## 2023-03-14 PROCEDURE — 99283 EMERGENCY DEPT VISIT LOW MDM: CPT

## 2023-03-14 RX ORDER — LIDOCAINE 50 MG/G
1 PATCH TOPICAL DAILY
Qty: 10 PATCH | Refills: 0 | Status: SHIPPED | OUTPATIENT
Start: 2023-03-14 | End: 2023-03-24

## 2023-03-14 RX ORDER — IBUPROFEN 800 MG/1
800 TABLET ORAL 3 TIMES DAILY PRN
Qty: 15 TABLET | Refills: 0 | Status: SHIPPED | OUTPATIENT
Start: 2023-03-14 | End: 2023-03-19

## 2023-03-14 ASSESSMENT — PAIN DESCRIPTION - PAIN TYPE: TYPE: ACUTE PAIN

## 2023-03-14 ASSESSMENT — PAIN - FUNCTIONAL ASSESSMENT
PAIN_FUNCTIONAL_ASSESSMENT: 0-10
PAIN_FUNCTIONAL_ASSESSMENT: 0-10

## 2023-03-14 ASSESSMENT — PAIN DESCRIPTION - FREQUENCY: FREQUENCY: CONTINUOUS

## 2023-03-14 ASSESSMENT — PAIN SCALES - GENERAL
PAINLEVEL_OUTOF10: 7
PAINLEVEL_OUTOF10: 8

## 2023-03-14 ASSESSMENT — PAIN DESCRIPTION - LOCATION
LOCATION: CHEST
LOCATION: CHEST

## 2023-03-14 ASSESSMENT — PAIN DESCRIPTION - DESCRIPTORS
DESCRIPTORS: ACHING
DESCRIPTORS: ACHING

## 2023-03-14 NOTE — ED NOTES
Discharge and education instructions reviewed with patient. Teach-back successful. Pt verbalized understanding and signed d/c papers. Pt denied questions at this time. No acute distress noted. Patient instructed to follow-up as noted - return to emergency department if symptoms worsen. Patient verbalized understanding. Discharged per EDMD with discharge instructions. Pt discharged  to private vehicle. Patient stable upon departure. Thanked patient for choosing [facility]) for care.           [facility], RN  03/14/23 2648

## 2023-03-14 NOTE — Clinical Note
Vicky Enriquez was seen and treated in our emergency department on 3/14/2023. She may return to work on 03/16/2023. No restrictions     If you have any questions or concerns, please don't hesitate to call.       Mamie Montiel, DO

## 2023-03-14 NOTE — ED PROVIDER NOTES
[address] ENCOUNTER      Pt Name: Gricelda Kline  MRN: [id number]  [place] 1960  Date of evaluation: 3/14/2023  Provider: Araceli Santana, [facility]  Chief Complaint   Patient presents with    Chest Injury     On Saturday she was going to wash her car. She rolled her window to put money in the machine. While she was leaning out the window to pay. Her foot slipped and she hit her chest on the car door . She was wearing a raegan ring on a chain. That hit her sternum. She has been having pain in sternum/chest area since hitting it on Saturday          This patient is at risk for a communicable infection. Therefore, personal protection equipment consisting of a mask was worn for the exam.    HPI  Gricelda Kline is a 66 y.o. female who presents with midsternal chest pain that occurred on Saturday when she was at a car wash. She states she was wearing a necklace with a man's ring and leaned forward to pay. She slipped, hitting the ring against her mid sternum. Pain has not improved. She has also developed a congested cough since then. Movement, coughing, and deep breaths make it worse and rest makes it better. She describes it as sharp and severe. She states there has been no bruising or bleeding. REVIEW OF SYSTEMS  All systems negative except as noted in the HPI. Reviewed Nurses' notes and concur. No LMP recorded.  Patient is postmenopausal.    PAST MEDICAL HISTORY  Past Medical History:   Diagnosis Date    Ad[facility]al [facility] ([facility]     Aneurysm, cerebral     Cerebral artery occlusion with cerebral infarction [facility])     Depression     Diverticulosis     Liver mass     Rectal bleed     [facility]        FAMILY HISTORY  Family History   Problem Relation Age of Onset    Cancer Mother 40        ovarian cancer    Heart Disease Maternal Uncle         MI    High Blood Pressure Maternal Uncle     Cancer Maternal Grandmother     Cancer Maternal Grandfather         Prostate       SOCIAL HISTORY   reports that she has been smoking cigarettes. She has a 19.50 pack-year smoking history. She has never used smokeless tobacco. She reports that she does not currently use alcohol. She reports current drug use. Drug: Marijuana Kenard Snooks). SURGICAL HISTORY  Past Surgical History:   Procedure Laterality Date    BRAIN SURGERY      Aneurism surgery - 2 aneurisms repaired - 1 remains    BREAST SURGERY Right     biopsy    COLONOSCOPY      TUBAL LIGATION         CURRENT MEDICATIONS  Current Outpatient Rx   Medication Sig Dispense Refill    ibuprofen (ADVIL;MOTRIN) 800 MG tablet Take 1 tablet by mouth 3 times daily as needed for Pain 15 tablet 0    lidocaine (LIDODERM) 5 % Place 1 patch onto the skin daily for 10 days 12 hours on, 12 hours off. 10 patch 0    albuterol sulfate HFA (VENTOLIN HFA) 108 (90 Base) MCG/ACT inhaler Inhale 2 puffs into the lungs 4 times daily as needed for Wheezing 18 g 0       ALLERGIES  No Known Allergies    Tetanus vaccination status reviewed: tetanus re-vaccination not indicated. PHYSICAL EXAM  VITAL SIGNS: BP (!) 145/77   Pulse 97   Temp 98.1 °F (36.7 °C) (Oral)   Resp 16   Ht 5' 5\" (1.651 m)   Wt 127 lb 3.3 oz (57.7 kg)   SpO2 98%   BMI 21.17 kg/m²   Constitutional: Well-developed, well-nourished, appears normal, nontoxic, activity: Sitting on the cart, no obvious pain, does not appear ill or toxic  Lymphatic: No lymphadenopathy noted. Cardiovascular: Normal heart rate, Normal rhythm, no murmurs, no gallops, no rubs. Thorax & Lungs: normal breath sounds, no respiratory distress, no wheezing, no rales, no rhonchi, moderate mid sternal tender without lacerations, abrasions, bruising, redness, or other evidence of injury. Abdomen: Soft, no tender with no distension, no masses, no pulsatile masses, no hepatosplenomegaly, normal bowel sounds  Skin: Warm, Dry, No erythema, No rash. no abrasions, Lacerations-none.   Extremities: no major deformities noted. Neurologic: Alert & oriented x 3Psychiatric: Affect normal, Mood normal.    COURSE & MEDICAL DECISION MAKING  Pertinent Imaging studies reviewed. (See chart for details)    RADIOLOGY/PROCEDURES  I personally reviewed the images for this case. XR CHEST (2 VW)    (Results Pending)        Vitals:    03/14/23 1822   BP: (!) 145/77   Pulse: 97   Resp: 16   Temp: 98.1 °F (36.7 °C)   TempSrc: Oral   SpO2: 98%   Weight: 127 lb 3.3 oz (57.7 kg)   Height: 5' 5\" (1.651 m)       Medications - No data to display    New Prescriptions    IBUPROFEN (ADVIL;MOTRIN) 800 MG TABLET    Take 1 tablet by mouth 3 times daily as needed for Pain    LIDOCAINE (LIDODERM) 5 %    Place 1 patch onto the skin daily for 10 days 12 hours on, 12 hours off. SEP-1 CORE MEASURE DATA  Exclusion criteria: the patient is NOT to be included for sepsis due to:  SIRS criteria are not met    Patient remained stable in the ED. x-rays of her chest and sternum were negative for fracture dislocation. She does have a scoliosis. Chest x-ray was negative for any infiltrates. There is no evidence of bronchitis. Therefore, patient was discharged with a prescription of Lidoderm patch to place over sternum and ibuprofen for pain. She was instructed to follow-up with her doctor in 3 days and return to the emergency department for any problems. Diagnostic considerations include but are not limited to:  Rib fractures, Pulmonary Contusion, Cardiac contusion, Pneumothorax, Pneumomediastinum, Hemothorax, Splenic laceration, Liver laceration, Renal contusion, Thoracic aortic injury, other.     Chest x-ray-chest x-ray was ordered to rule out pneumonia, pneumothorax, congestive heart failure, cardiomegaly, chest masses, aortic aneurysm, hiatal hernia, rib fractures, or any other pathology that might be causing the patient's symptoms    The patient's blood pressure was found to be elevated according to CMS/Medicare and the Affordable Care Act/ObamaCare criteria. Elevated blood pressure could occur because of pain or anxiety or other reasons and does not mean that they need to have their blood pressure treated or medications otherwise adjusted. However, this could also be a sign that they will need to have their blood pressure treated or medications changed. The patient was instructed to follow up closely with their personal physician to have their blood pressure rechecked. The patient was instructed to take a list of recent blood pressure readings to their next visit with their personal physician. See discharge instructions for specific medications, discharge information, and treatments. They were verbally instructed to return to emergency if any problems. (This chart has been completed using [address]. Although attempts have been made to ensure accuracy, words and/or phrases may not be transcribed as intended.)    Patient neither refused nor requested pain medicines at the time of their exam.    IMPRESSION(S):  1. Sternal contusion, initial encounter        ?   Recheck Times: Melanie Moreira 232, DO  03/14/23 4941

## 2023-03-25 ENCOUNTER — APPOINTMENT (OUTPATIENT)
Dept: CT IMAGING | Age: 63
End: 2023-03-25
Payer: MEDICAID

## 2023-03-25 ENCOUNTER — HOSPITAL ENCOUNTER (EMERGENCY)
Age: 63
Discharge: HOME | End: 2023-03-25
Attending: EMERGENCY MEDICINE
Payer: MEDICAID

## 2023-03-25 VITALS
BODY MASS INDEX: 20.28 KG/M2 | WEIGHT: 126.2 LBS | DIASTOLIC BLOOD PRESSURE: 81 MMHG | OXYGEN SATURATION: 97 % | SYSTOLIC BLOOD PRESSURE: 127 MMHG | RESPIRATION RATE: 19 BRPM | TEMPERATURE: 98.1 F | HEIGHT: 66 IN | HEART RATE: 91 BPM

## 2023-03-25 DIAGNOSIS — J44.1 COPD EXACERBATION (HCC): Primary | ICD-10-CM

## 2023-03-25 DIAGNOSIS — R07.89 CHEST WALL PAIN: ICD-10-CM

## 2023-03-25 DIAGNOSIS — N30.00 ACUTE CYSTITIS WITHOUT HEMATURIA: ICD-10-CM

## 2023-03-25 LAB
ALBUMIN SERPL-MCNC: 3.7 G/DL (ref 3.4–5)
ALBUMIN/GLOB SERPL: 1.5 {RATIO} (ref 1.1–2.2)
ALP SERPL-CCNC: 70 U/L (ref 40–129)
ALT SERPL-CCNC: 16 U/L (ref 10–40)
ANION GAP SERPL CALCULATED.3IONS-SCNC: 10 MMOL/L (ref 3–16)
AST SERPL-CCNC: 13 U/L (ref 15–37)
BACTERIA URNS QL MICRO: ABNORMAL /HPF
BASOPHILS # BLD: 0 K/UL (ref 0–0.2)
BASOPHILS NFR BLD: 0 %
BILIRUB SERPL-MCNC: <0.2 MG/DL (ref 0–1)
BILIRUB UR QL STRIP.AUTO: NEGATIVE
BUN SERPL-MCNC: 18 MG/DL (ref 7–20)
CALCIUM SERPL-MCNC: 8.7 MG/DL (ref 8.3–10.6)
CHARACTER UR: ABNORMAL
CHLORIDE SERPL-SCNC: 108 MMOL/L (ref 99–110)
CLARITY UR: ABNORMAL
CO2 SERPL-SCNC: 24 MMOL/L (ref 21–32)
COLOR UR: YELLOW
CREAT SERPL-MCNC: 0.7 MG/DL (ref 0.6–1.2)
D DIMER: 0.31 UG/ML FEU (ref 0–0.6)
DEPRECATED RDW RBC AUTO: 14.5 % (ref 12.4–15.4)
EOSINOPHIL # BLD: 0.2 K/UL (ref 0–0.6)
EOSINOPHIL NFR BLD: 2 %
EPI CELLS #/AREA URNS HPF: ABNORMAL /HPF (ref 0–5)
GFR SERPLBLD CREATININE-BSD FMLA CKD-EPI: >60 ML/MIN/{1.73_M2}
GLUCOSE SERPL-MCNC: 108 MG/DL (ref 70–99)
GLUCOSE UR STRIP.AUTO-MCNC: NEGATIVE MG/DL
HCT VFR BLD AUTO: 38.4 % (ref 36–48)
HGB BLD-MCNC: 13 G/DL (ref 12–16)
HGB UR QL STRIP.AUTO: ABNORMAL
KETONES UR STRIP.AUTO-MCNC: NEGATIVE MG/DL
LEUKOCYTE ESTERASE UR QL STRIP.AUTO: ABNORMAL
LYMPHOCYTES # BLD: 3.3 K/UL (ref 1–5.1)
LYMPHOCYTES NFR BLD: 25 %
MCH RBC QN AUTO: 31.3 PG (ref 26–34)
MCHC RBC AUTO-ENTMCNC: 33.9 G/DL (ref 32–36.4)
MCV RBC AUTO: 92.5 FL (ref 80–100)
MONOCYTES # BLD: 0.5 K/UL (ref 0–1.3)
MONOCYTES NFR BLD: 5 %
NEUTROPHILS # BLD: 6.3 K/UL (ref 1.7–7.7)
NEUTROPHILS NFR BLD: 61 %
NITRITE UR QL STRIP.AUTO: POSITIVE
NT-PROBNP SERPL-MCNC: 143 PG/ML (ref 0–124)
PH UR STRIP.AUTO: 7 [PH] (ref 5–8)
PLATELET # BLD AUTO: 292 K/UL (ref 135–450)
PMV BLD AUTO: 8.4 FL (ref 5–10.5)
POTASSIUM SERPL-SCNC: 3.9 MMOL/L (ref 3.5–5.1)
PROT SERPL-MCNC: 6.2 G/DL (ref 6.4–8.2)
PROT UR STRIP.AUTO-MCNC: ABNORMAL MG/DL
RBC # BLD AUTO: 4.15 M/UL (ref 4–5.2)
RBC #/AREA URNS HPF: ABNORMAL /HPF (ref 0–4)
SODIUM SERPL-SCNC: 142 MMOL/L (ref 136–145)
SP GR UR STRIP.AUTO: 1.02 (ref 1–1.03)
TROPONIN T SERPL-MCNC: <0.01 NG/ML
UA COMPLETE W REFLEX CULTURE PNL UR: ABNORMAL
UA DIPSTICK W REFLEX MICRO PNL UR: YES
URN SPEC COLLECT METH UR: ABNORMAL
UROBILINOGEN UR STRIP-ACNC: 0.2 E.U./DL
VARIANT LYMPHS NFR BLD MANUAL: 7 % (ref 0–6)
WBC # BLD AUTO: 10.4 K/UL (ref 4–11)
WBC #/AREA URNS HPF: ABNORMAL /HPF (ref 0–5)

## 2023-03-25 PROCEDURE — 80053 COMPREHEN METABOLIC PANEL: CPT

## 2023-03-25 PROCEDURE — 81001 URINALYSIS AUTO W/SCOPE: CPT

## 2023-03-25 PROCEDURE — 6370000000 HC RX 637 (ALT 250 FOR IP): Performed by: EMERGENCY MEDICINE

## 2023-03-25 PROCEDURE — 85025 COMPLETE CBC W/AUTO DIFF WBC: CPT

## 2023-03-25 PROCEDURE — 85379 FIBRIN DEGRADATION QUANT: CPT

## 2023-03-25 PROCEDURE — 99284 EMERGENCY DEPT VISIT MOD MDM: CPT

## 2023-03-25 PROCEDURE — 36415 COLL VENOUS BLD VENIPUNCTURE: CPT

## 2023-03-25 PROCEDURE — 93005 ELECTROCARDIOGRAM TRACING: CPT | Performed by: EMERGENCY MEDICINE

## 2023-03-25 PROCEDURE — 96374 THER/PROPH/DIAG INJ IV PUSH: CPT

## 2023-03-25 PROCEDURE — 96375 TX/PRO/DX INJ NEW DRUG ADDON: CPT

## 2023-03-25 PROCEDURE — 83880 ASSAY OF NATRIURETIC PEPTIDE: CPT

## 2023-03-25 PROCEDURE — 71250 CT THORAX DX C-: CPT

## 2023-03-25 PROCEDURE — 84484 ASSAY OF TROPONIN QUANT: CPT

## 2023-03-25 PROCEDURE — 6360000002 HC RX W HCPCS: Performed by: EMERGENCY MEDICINE

## 2023-03-25 PROCEDURE — 2580000003 HC RX 258: Performed by: EMERGENCY MEDICINE

## 2023-03-25 RX ORDER — METHYLPREDNISOLONE SODIUM SUCCINATE 125 MG/2ML
125 INJECTION, POWDER, LYOPHILIZED, FOR SOLUTION INTRAMUSCULAR; INTRAVENOUS ONCE
Status: COMPLETED | OUTPATIENT
Start: 2023-03-25 | End: 2023-03-25

## 2023-03-25 RX ORDER — LEVOFLOXACIN 250 MG/1
500 TABLET ORAL ONCE
Status: COMPLETED | OUTPATIENT
Start: 2023-03-25 | End: 2023-03-25

## 2023-03-25 RX ORDER — KETOROLAC TROMETHAMINE 30 MG/ML
15 INJECTION, SOLUTION INTRAMUSCULAR; INTRAVENOUS ONCE
Status: COMPLETED | OUTPATIENT
Start: 2023-03-25 | End: 2023-03-25

## 2023-03-25 RX ORDER — IPRATROPIUM BROMIDE AND ALBUTEROL SULFATE 2.5; .5 MG/3ML; MG/3ML
1 SOLUTION RESPIRATORY (INHALATION) ONCE
Status: COMPLETED | OUTPATIENT
Start: 2023-03-25 | End: 2023-03-25

## 2023-03-25 RX ORDER — LEVOFLOXACIN 500 MG/1
500 TABLET, FILM COATED ORAL DAILY
Qty: 7 TABLET | Refills: 0 | Status: SHIPPED | OUTPATIENT
Start: 2023-03-25 | End: 2023-04-01

## 2023-03-25 RX ORDER — 0.9 % SODIUM CHLORIDE 0.9 %
1000 INTRAVENOUS SOLUTION INTRAVENOUS ONCE
Status: COMPLETED | OUTPATIENT
Start: 2023-03-25 | End: 2023-03-25

## 2023-03-25 RX ORDER — PREDNISONE 20 MG/1
60 TABLET ORAL DAILY
Qty: 15 TABLET | Refills: 0 | Status: SHIPPED | OUTPATIENT
Start: 2023-03-25 | End: 2023-03-30

## 2023-03-25 RX ORDER — ALBUTEROL SULFATE 90 UG/1
2 AEROSOL, METERED RESPIRATORY (INHALATION) 4 TIMES DAILY PRN
Qty: 18 G | Refills: 0 | Status: SHIPPED | OUTPATIENT
Start: 2023-03-25

## 2023-03-25 RX ORDER — PREDNISONE 20 MG/1
60 TABLET ORAL ONCE
Status: COMPLETED | OUTPATIENT
Start: 2023-03-25 | End: 2023-03-25

## 2023-03-25 RX ADMIN — PREDNISONE 60 MG: 20 TABLET ORAL at 20:17

## 2023-03-25 RX ADMIN — IPRATROPIUM BROMIDE AND ALBUTEROL SULFATE 1 AMPULE: 2.5; .5 SOLUTION RESPIRATORY (INHALATION) at 19:03

## 2023-03-25 RX ADMIN — LEVOFLOXACIN 500 MG: 250 TABLET, FILM COATED ORAL at 20:17

## 2023-03-25 RX ADMIN — SODIUM CHLORIDE 1000 ML: 9 INJECTION, SOLUTION INTRAVENOUS at 18:55

## 2023-03-25 RX ADMIN — KETOROLAC TROMETHAMINE 15 MG: 30 INJECTION, SOLUTION INTRAMUSCULAR at 18:56

## 2023-03-25 RX ADMIN — METHYLPREDNISOLONE SODIUM SUCCINATE 125 MG: 125 INJECTION, POWDER, FOR SOLUTION INTRAMUSCULAR; INTRAVENOUS at 18:59

## 2023-03-25 ASSESSMENT — ENCOUNTER SYMPTOMS
ABDOMINAL PAIN: 0
RHINORRHEA: 0
NAUSEA: 0
EYE DISCHARGE: 0
VOMITING: 0
SORE THROAT: 0
BACK PAIN: 0
SHORTNESS OF BREATH: 0
COUGH: 1
EYE PAIN: 0
WHEEZING: 0
DIARRHEA: 0

## 2023-03-25 ASSESSMENT — PAIN DESCRIPTION - ORIENTATION: ORIENTATION: RIGHT

## 2023-03-25 ASSESSMENT — PAIN DESCRIPTION - LOCATION
LOCATION: RIB CAGE
LOCATION: RIB CAGE

## 2023-03-25 ASSESSMENT — PAIN SCALES - GENERAL
PAINLEVEL_OUTOF10: 7
PAINLEVEL_OUTOF10: 6
PAINLEVEL_OUTOF10: 9

## 2023-03-25 ASSESSMENT — PAIN DESCRIPTION - DESCRIPTORS: DESCRIPTORS: SHARP

## 2023-03-25 ASSESSMENT — PAIN - FUNCTIONAL ASSESSMENT: PAIN_FUNCTIONAL_ASSESSMENT: 0-10

## 2023-03-25 NOTE — ED PROVIDER NOTES
[facility]    Pt Name: Leora Holt   MRN: [id number]   [place] 1960   Date of evaluation: 3/25/2023   Provider: Nunu Berg MD   PCP: Arun Lu MD   Note Started: 6:08 PM EDT 3/25/23       CHIEF COMPLAINT  Rib Pain (injury) (Right rib pain. Patient leaning on truck window and had a ring on a necklace and patient stated had a pain from leaning on the ring. Patient came here on 3/14 and pain is not getting better.  )       HISTORY OF PRESENT ILLNESS  Leora Holt is a 66 y.o. female who  has a past medical history of Adrenal mass ([facility] Aneurysm, cerebral, Cerebral artery occlusion with cerebral infarction [facility]), Depression, Diverticulosis, Liver mass, Rectal bleed, and Seizures ([facility] who presents to the ED with right-sided chest pain. Patient was here on March 14 with with what appeared to be just a chest wall injury. 3 days before that on March 11 that she was leaning forward and had a ring on a chain on her chest that pushed into her chest.  Developed pain at that time. She was here on March 14 and we did a chest x-ray that was negative. She is, unfortunately a smoker. It does hurt to breathe that does hurt to touch her chest.  No fever chills abdominal pain nausea or vomiting. I have reviewed the following from the nursing documentation:        REVIEW OF SYSTEMS :    Review of Systems   Constitutional:  Negative for chills, fatigue and fever. HENT:  Negative for ear pain, rhinorrhea and sore throat. Eyes:  Negative for pain, discharge and visual disturbance. Respiratory:  Positive for cough. Negative for shortness of breath and wheezing. Cardiovascular:  Positive for chest pain. Negative for palpitations and leg swelling. Gastrointestinal:  Negative for abdominal pain, diarrhea, nausea and vomiting. Genitourinary:  Negative for difficulty urinating, dysuria, pelvic pain and vaginal discharge.    Musculoskeletal:  Negative for ampule Inhalation Given 3/25/23 1903)   methylPREDNISolone sodium (SOLU-MEDROL) injection 125 mg (125 mg IntraVENous Given 3/25/23 1859)   ketorolac (TORADOL) injection 15 mg (15 mg IntraVENous Given 3/25/23 1856)   levoFLOXacin (LEVAQUIN) tablet 500 mg (500 mg Oral Given 3/25/23 2017)   predniSONE (DELTASONE) tablet 60 mg (60 mg Oral Given 3/25/23 2017)      Case was turned over to my partner, Dr. Sulaiman Chacon who will re-evaluate and disposition. Patient was given prescriptions for the following medications. I counseled the patient as to how to take these medications. Discharge Medication List as of 3/25/2023  8:15 PM        START taking these medications    Details   levoFLOXacin (LEVAQUIN) 500 MG tablet Take 1 tablet by mouth daily for 7 days, Disp-7 tablet, R-0Normal      !! albuterol sulfate HFA (VENTOLIN HFA) 108 (90 Base) MCG/ACT inhaler Inhale 2 puffs into the lungs 4 times daily as needed for Wheezing, Disp-18 g, R-0Normal      predniSONE (DELTASONE) 20 MG tablet Take 3 tablets by mouth daily for 5 days, Disp-15 tablet, R-0Normal       !! - Potential duplicate medications found. Please discuss with provider. Patient instructed to follow up with:   Stacie Anne [address]  [phone]    In 3 days      All questions were answered and the patient/family expressed understanding and agreement with the plan. PROCEDURES  None    CRITICAL CARE  N/A    CLINICAL IMPRESSION  1. COPD exacerbation (HCC)    2. Chest wall pain    3. Acute cystitis without hematuria        DISPOSITION  Decision To Discharge 03/25/2023 08:03:48 PM     Dilip Wilkinson MD    Note: This chart was created using voice recognition dictation software. Efforts were made by me to ensure accuracy, however some errors may be present due to limitations of this technology and occasionally words are not transcribed correctly.        Dilip Wilkinson MD  03/25/23 5646

## 2023-03-25 NOTE — ED TRIAGE NOTES
Patient came to ER with complaints of right rib pain. Patient hurt right rib 3/14. Patient has continued pain.

## 2023-03-26 LAB
EKG ATRIAL RATE: 101 BPM
EKG DIAGNOSIS: NORMAL
EKG P AXIS: 81 DEGREES
EKG P-R INTERVAL: 154 MS
EKG Q-T INTERVAL: 330 MS
EKG QRS DURATION: 70 MS
EKG QTC CALCULATION (BAZETT): 427 MS
EKG R AXIS: 73 DEGREES
EKG T AXIS: 81 DEGREES
EKG VENTRICULAR RATE: 101 BPM

## 2023-03-26 PROCEDURE — 93010 ELECTROCARDIOGRAM REPORT: CPT | Performed by: INTERNAL MEDICINE

## 2023-03-26 NOTE — ED PROVIDER NOTES
[place] of Care Note:    I assumed care of this patient at 1900 from Dr. Raj Cedeno, please see Raj Cedeno note for more detail. Briefly, this pt is a 37-year-old female with complaints of cough, congestion, shortness of breath, chest wall tightness. Laboratory tests were reviewed revealing a CAT scan of the chest with emphysema changes. No evidence of pneumothorax, pleural effusion, tumor. D-dimer is negative and the patient with a negative Wells score making the likelihood of pulmonary embolism extremely unlikely. Patient's troponin is normal.  Patient's presentation is most consistent with chest wall pain, musculoskeletal strain, costochondritis or pleurisy. Patient will be given albuterol MDI 2 puffs every 4 hours as needed, prednisone burst and Levaquin to primarily treat for urinary tract infection but will also cover respiratory etiologies as well. Follow-up with Dr. Pereira Mess in 2 to 3 days to consider pulmonary function testing. Return to the emergency for worsening or progressive symptoms. FINAL IMPRESSION      1. COPD exacerbation (HCC)    2. Chest wall pain    3.  Acute cystitis without hematuria              Jesús Foss MD  03/25/23 2007

## 2023-06-10 ENCOUNTER — HOSPITAL ENCOUNTER (EMERGENCY)
Age: 63
Discharge: HOME | End: 2023-06-10
Attending: EMERGENCY MEDICINE
Payer: MEDICAID

## 2023-06-10 ENCOUNTER — APPOINTMENT (OUTPATIENT)
Dept: CT IMAGING | Age: 63
End: 2023-06-10
Payer: MEDICAID

## 2023-06-10 VITALS
SYSTOLIC BLOOD PRESSURE: 153 MMHG | OXYGEN SATURATION: 97 % | HEIGHT: 66 IN | BODY MASS INDEX: 19.29 KG/M2 | RESPIRATION RATE: 16 BRPM | TEMPERATURE: 97.7 F | DIASTOLIC BLOOD PRESSURE: 90 MMHG | HEART RATE: 73 BPM | WEIGHT: 120 LBS

## 2023-06-10 DIAGNOSIS — N39.0 URINARY TRACT INFECTION WITH HEMATURIA, SITE UNSPECIFIED: ICD-10-CM

## 2023-06-10 DIAGNOSIS — R31.9 URINARY TRACT INFECTION WITH HEMATURIA, SITE UNSPECIFIED: ICD-10-CM

## 2023-06-10 DIAGNOSIS — R10.9 FLANK PAIN: Primary | ICD-10-CM

## 2023-06-10 LAB
AMORPH SED URNS QL MICRO: ABNORMAL /HPF
ANION GAP SERPL CALCULATED.3IONS-SCNC: 9 MMOL/L (ref 3–16)
BACTERIA URNS QL MICRO: ABNORMAL /HPF
BASOPHILS # BLD: 0 K/UL (ref 0–0.2)
BASOPHILS NFR BLD: 0.4 %
BILIRUB UR QL STRIP.AUTO: NEGATIVE
BUN SERPL-MCNC: 16 MG/DL (ref 7–20)
CALCIUM SERPL-MCNC: 9.3 MG/DL (ref 8.3–10.6)
CHLORIDE SERPL-SCNC: 106 MMOL/L (ref 99–110)
CLARITY UR: ABNORMAL
CO2 SERPL-SCNC: 25 MMOL/L (ref 21–32)
COLOR UR: YELLOW
CREAT SERPL-MCNC: 0.9 MG/DL (ref 0.6–1.2)
DEPRECATED RDW RBC AUTO: 13.9 % (ref 12.4–15.4)
EOSINOPHIL # BLD: 0.6 K/UL (ref 0–0.6)
EOSINOPHIL NFR BLD: 5.7 %
EPI CELLS #/AREA URNS HPF: ABNORMAL /HPF (ref 0–5)
GFR SERPLBLD CREATININE-BSD FMLA CKD-EPI: >60 ML/MIN/{1.73_M2}
GLUCOSE SERPL-MCNC: 108 MG/DL (ref 70–99)
GLUCOSE UR STRIP.AUTO-MCNC: NEGATIVE MG/DL
HCT VFR BLD AUTO: 41.1 % (ref 36–48)
HGB BLD-MCNC: 13.6 G/DL (ref 12–16)
HGB UR QL STRIP.AUTO: ABNORMAL
IMM GRANULOCYTES # BLD: 0 K/UL (ref 0–0.2)
IMM GRANULOCYTES NFR BLD: 0.2 %
KETONES UR STRIP.AUTO-MCNC: NEGATIVE MG/DL
LEUKOCYTE ESTERASE UR QL STRIP.AUTO: ABNORMAL
LYMPHOCYTES # BLD: 1.9 K/UL (ref 1–5.1)
LYMPHOCYTES NFR BLD: 17 %
MCH RBC QN AUTO: 31.1 PG (ref 26–34)
MCHC RBC AUTO-ENTMCNC: 33.1 G/DL (ref 32–36.4)
MCV RBC AUTO: 93.8 FL (ref 80–100)
MONOCYTES # BLD: 1.3 K/UL (ref 0–1.3)
MONOCYTES NFR BLD: 11.4 %
NEUTROPHILS # BLD: 7.3 K/UL (ref 1.7–7.7)
NEUTROPHILS NFR BLD: 65.3 %
NITRITE UR QL STRIP.AUTO: NEGATIVE
PH UR STRIP.AUTO: 7 [PH] (ref 5–8)
PLATELET # BLD AUTO: 283 K/UL (ref 135–450)
PMV BLD AUTO: 8.4 FL (ref 5–10.5)
POTASSIUM SERPL-SCNC: 4 MMOL/L (ref 3.5–5.1)
PROT UR STRIP.AUTO-MCNC: NEGATIVE MG/DL
RBC # BLD AUTO: 4.38 M/UL (ref 4–5.2)
RBC #/AREA URNS HPF: ABNORMAL /HPF (ref 0–4)
SODIUM SERPL-SCNC: 140 MMOL/L (ref 136–145)
SP GR UR STRIP.AUTO: 1.02 (ref 1–1.03)
UA COMPLETE W REFLEX CULTURE PNL UR: ABNORMAL
UA DIPSTICK W REFLEX MICRO PNL UR: YES
URN SPEC COLLECT METH UR: ABNORMAL
UROBILINOGEN UR STRIP-ACNC: 0.2 E.U./DL
WBC # BLD AUTO: 11.2 K/UL (ref 4–11)
WBC #/AREA URNS HPF: ABNORMAL /HPF (ref 0–5)

## 2023-06-10 PROCEDURE — 80048 BASIC METABOLIC PNL TOTAL CA: CPT

## 2023-06-10 PROCEDURE — 74176 CT ABD & PELVIS W/O CONTRAST: CPT

## 2023-06-10 PROCEDURE — 6370000000 HC RX 637 (ALT 250 FOR IP): Performed by: EMERGENCY MEDICINE

## 2023-06-10 PROCEDURE — 36415 COLL VENOUS BLD VENIPUNCTURE: CPT

## 2023-06-10 PROCEDURE — 6360000002 HC RX W HCPCS: Performed by: EMERGENCY MEDICINE

## 2023-06-10 PROCEDURE — 85025 COMPLETE CBC W/AUTO DIFF WBC: CPT

## 2023-06-10 PROCEDURE — 2580000003 HC RX 258: Performed by: EMERGENCY MEDICINE

## 2023-06-10 PROCEDURE — 81001 URINALYSIS AUTO W/SCOPE: CPT

## 2023-06-10 RX ORDER — 0.9 % SODIUM CHLORIDE 0.9 %
500 INTRAVENOUS SOLUTION INTRAVENOUS ONCE
Status: COMPLETED | OUTPATIENT
Start: 2023-06-10 | End: 2023-06-10

## 2023-06-10 RX ORDER — METAXALONE 800 MG/1
800 TABLET ORAL 3 TIMES DAILY
Qty: 30 TABLET | Refills: 0 | Status: SHIPPED | OUTPATIENT
Start: 2023-06-10 | End: 2023-06-20

## 2023-06-10 RX ORDER — HYDROCODONE BITARTRATE AND ACETAMINOPHEN 5; 325 MG/1; MG/1
1 TABLET ORAL ONCE
Status: COMPLETED | OUTPATIENT
Start: 2023-06-10 | End: 2023-06-10

## 2023-06-10 RX ORDER — LIDOCAINE 50 MG/G
1 PATCH TOPICAL DAILY
Qty: 10 PATCH | Refills: 0 | Status: SHIPPED | OUTPATIENT
Start: 2023-06-10 | End: 2023-06-20

## 2023-06-10 RX ORDER — KETOROLAC TROMETHAMINE 30 MG/ML
15 INJECTION, SOLUTION INTRAMUSCULAR; INTRAVENOUS ONCE
Status: COMPLETED | OUTPATIENT
Start: 2023-06-10 | End: 2023-06-10

## 2023-06-10 RX ORDER — CEPHALEXIN 500 MG/1
500 CAPSULE ORAL 2 TIMES DAILY
Qty: 14 CAPSULE | Refills: 0 | Status: SHIPPED | OUTPATIENT
Start: 2023-06-10 | End: 2023-06-17

## 2023-06-10 RX ADMIN — KETOROLAC TROMETHAMINE 15 MG: 30 INJECTION, SOLUTION INTRAMUSCULAR; INTRAVENOUS at 06:22

## 2023-06-10 RX ADMIN — SODIUM CHLORIDE 500 ML: 9 INJECTION, SOLUTION INTRAVENOUS at 06:22

## 2023-06-10 RX ADMIN — HYDROCODONE BITARTRATE AND ACETAMINOPHEN 1 TABLET: 5; 325 TABLET ORAL at 06:55

## 2023-06-10 ASSESSMENT — PAIN SCALES - GENERAL
PAINLEVEL_OUTOF10: 1
PAINLEVEL_OUTOF10: 4
PAINLEVEL_OUTOF10: 7

## 2023-06-10 ASSESSMENT — ENCOUNTER SYMPTOMS
SHORTNESS OF BREATH: 0
ABDOMINAL PAIN: 0

## 2023-06-10 ASSESSMENT — PAIN - FUNCTIONAL ASSESSMENT
PAIN_FUNCTIONAL_ASSESSMENT: NONE - DENIES PAIN
PAIN_FUNCTIONAL_ASSESSMENT: 0-10

## 2023-06-10 ASSESSMENT — PAIN DESCRIPTION - LOCATION: LOCATION: ABDOMEN

## 2023-06-10 NOTE — ED PROVIDER NOTES
Care time was  minutes, excluding separately reportable procedures. There was a high probability of clinically significant/life threatening deterioration in the patient's condition which required my urgent intervention. CONSULTS:  None    PROCEDURES:  Unless otherwise noted below, none     Procedures        FINAL IMPRESSION      1. Flank pain    2. Urinary tract infection with hematuria, site unspecified          DISPOSITION/PLAN   DISPOSITION        PATIENT REFERRED TO:  No follow-up provider specified. DISCHARGE MEDICATIONS:  New Prescriptions    No medications on file     Controlled Substances Monitoring:     RX Monitoring 9/29/2018   Attestation The Prescription Monitoring Report for this patient was reviewed today. Periodic Controlled Substance Monitoring No signs of potential drug abuse or diversion identified.        (Please note that portions of this note were completed with a voice recognition program.  Efforts were made to edit the dictations but occasionally words are mis-transcribed.)    Soni Hoff MD (electronically signed)  Attending Emergency Physician          Soni Hoff MD  06/10/23 6955

## 2023-06-10 NOTE — ED TRIAGE NOTES
Pt C/O chronic left hip, left flank and back pain and has concerns of possible UTI. 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

## 2023-06-10 NOTE — DISCHARGE INSTRUCTIONS
1. Tylenol or ibuprofen over the prescribed medications if needed. 2.  Antibiotics as directed. 3.  Follow-up with your primary care physician in the next couple of days. 4.  Return emergency department for worsening signs of infection.

## 2024-09-18 ENCOUNTER — HOSPITAL ENCOUNTER (EMERGENCY)
Age: 64
Discharge: HOME | End: 2024-09-18
Attending: EMERGENCY MEDICINE

## 2024-09-18 VITALS
WEIGHT: 112.43 LBS | DIASTOLIC BLOOD PRESSURE: 80 MMHG | HEIGHT: 66 IN | SYSTOLIC BLOOD PRESSURE: 135 MMHG | BODY MASS INDEX: 18.07 KG/M2 | HEART RATE: 98 BPM | OXYGEN SATURATION: 96 % | TEMPERATURE: 97.8 F | RESPIRATION RATE: 16 BRPM

## 2024-09-18 DIAGNOSIS — M53.3 SACROILIAC JOINT PAIN: Primary | ICD-10-CM

## 2024-09-18 LAB
BACTERIA URNS QL MICRO: ABNORMAL /HPF
BILIRUB UR QL STRIP.AUTO: NEGATIVE
CLARITY UR: CLEAR
COLOR UR: YELLOW
EPI CELLS #/AREA URNS HPF: ABNORMAL /HPF (ref 0–5)
GLUCOSE UR STRIP.AUTO-MCNC: NEGATIVE MG/DL
HGB UR QL STRIP.AUTO: ABNORMAL
KETONES UR STRIP.AUTO-MCNC: NEGATIVE MG/DL
LEUKOCYTE ESTERASE UR QL STRIP.AUTO: ABNORMAL
NITRITE UR QL STRIP.AUTO: NEGATIVE
PH UR STRIP.AUTO: 6.5 [PH] (ref 5–8)
PROT UR STRIP.AUTO-MCNC: NEGATIVE MG/DL
RBC #/AREA URNS HPF: ABNORMAL /HPF (ref 0–4)
SP GR UR STRIP.AUTO: 1.02 (ref 1–1.03)
UA COMPLETE W REFLEX CULTURE PNL UR: ABNORMAL
UA DIPSTICK W REFLEX MICRO PNL UR: YES
URN SPEC COLLECT METH UR: ABNORMAL
UROBILINOGEN UR STRIP-ACNC: 0.2 E.U./DL
WBC #/AREA URNS HPF: ABNORMAL /HPF (ref 0–5)

## 2024-09-18 PROCEDURE — 81001 URINALYSIS AUTO W/SCOPE: CPT

## 2024-09-18 PROCEDURE — 99284 EMERGENCY DEPT VISIT MOD MDM: CPT

## 2024-09-18 PROCEDURE — 6370000000 HC RX 637 (ALT 250 FOR IP): Performed by: EMERGENCY MEDICINE

## 2024-09-18 PROCEDURE — 6360000002 HC RX W HCPCS: Performed by: EMERGENCY MEDICINE

## 2024-09-18 PROCEDURE — 96372 THER/PROPH/DIAG INJ SC/IM: CPT

## 2024-09-18 RX ORDER — METHYLPREDNISOLONE 4 MG
TABLET, DOSE PACK ORAL
Qty: 1 KIT | Refills: 0 | Status: SHIPPED | OUTPATIENT
Start: 2024-09-18 | End: 2024-09-24

## 2024-09-18 RX ORDER — LIDOCAINE 4 G/G
1 PATCH TOPICAL ONCE
Status: DISCONTINUED | OUTPATIENT
Start: 2024-09-18 | End: 2024-09-18 | Stop reason: HOSPADM

## 2024-09-18 RX ORDER — SULFAMETHOXAZOLE/TRIMETHOPRIM 800-160 MG
1 TABLET ORAL ONCE
COMMUNITY
Start: 2024-09-12

## 2024-09-18 RX ORDER — ACETAMINOPHEN 500 MG
1000 TABLET ORAL EVERY 6 HOURS PRN
Qty: 30 TABLET | Refills: 0 | Status: SHIPPED | OUTPATIENT
Start: 2024-09-18

## 2024-09-18 RX ORDER — KETOROLAC TROMETHAMINE 30 MG/ML
30 INJECTION, SOLUTION INTRAMUSCULAR; INTRAVENOUS ONCE
Status: COMPLETED | OUTPATIENT
Start: 2024-09-18 | End: 2024-09-18

## 2024-09-18 RX ORDER — LIDOCAINE 50 MG/G
1 PATCH TOPICAL DAILY
Qty: 10 PATCH | Refills: 0 | Status: SHIPPED | OUTPATIENT
Start: 2024-09-18 | End: 2024-09-28

## 2024-09-18 RX ADMIN — KETOROLAC TROMETHAMINE 30 MG: 30 INJECTION, SOLUTION INTRAMUSCULAR at 04:27

## 2024-09-18 ASSESSMENT — LIFESTYLE VARIABLES
HOW MANY STANDARD DRINKS CONTAINING ALCOHOL DO YOU HAVE ON A TYPICAL DAY: 1 OR 2
HOW OFTEN DO YOU HAVE A DRINK CONTAINING ALCOHOL: 2-4 TIMES A MONTH

## 2024-09-18 ASSESSMENT — PAIN SCALES - GENERAL
PAINLEVEL_OUTOF10: 8
PAINLEVEL_OUTOF10: 8
PAINLEVEL_OUTOF10: 7

## 2024-09-18 ASSESSMENT — PAIN DESCRIPTION - ORIENTATION
ORIENTATION: LEFT;RIGHT
ORIENTATION: LOWER
ORIENTATION: LOWER

## 2024-09-18 ASSESSMENT — PAIN DESCRIPTION - LOCATION
LOCATION: BACK
LOCATION: BACK
LOCATION: BACK;FLANK

## 2024-09-18 ASSESSMENT — PAIN DESCRIPTION - PAIN TYPE
TYPE: ACUTE PAIN
TYPE: ACUTE PAIN

## 2024-09-18 ASSESSMENT — PAIN - FUNCTIONAL ASSESSMENT
PAIN_FUNCTIONAL_ASSESSMENT: 0-10
PAIN_FUNCTIONAL_ASSESSMENT: 0-10

## 2024-09-18 ASSESSMENT — PAIN DESCRIPTION - DESCRIPTORS: DESCRIPTORS: SQUEEZING;CRAMPING

## 2025-04-26 ENCOUNTER — HOSPITAL ENCOUNTER (EMERGENCY)
Age: 65
Discharge: HOME | End: 2025-04-26
Attending: EMERGENCY MEDICINE

## 2025-04-26 VITALS
HEART RATE: 105 BPM | HEIGHT: 66 IN | TEMPERATURE: 98.1 F | OXYGEN SATURATION: 96 % | DIASTOLIC BLOOD PRESSURE: 68 MMHG | BODY MASS INDEX: 18.67 KG/M2 | SYSTOLIC BLOOD PRESSURE: 140 MMHG | RESPIRATION RATE: 15 BRPM | WEIGHT: 116.18 LBS

## 2025-04-26 DIAGNOSIS — R11.2 CANNABINOID HYPEREMESIS SYNDROME: ICD-10-CM

## 2025-04-26 DIAGNOSIS — F12.90 CANNABINOID HYPEREMESIS SYNDROME: ICD-10-CM

## 2025-04-26 DIAGNOSIS — R11.2 NAUSEA AND VOMITING, UNSPECIFIED VOMITING TYPE: Primary | ICD-10-CM

## 2025-04-26 LAB
ALBUMIN SERPL-MCNC: 4.2 G/DL (ref 3.4–5)
ALP SERPL-CCNC: 58 U/L (ref 40–129)
ALT SERPL-CCNC: 18 U/L (ref 10–40)
ANION GAP SERPL CALCULATED.3IONS-SCNC: 13 MMOL/L (ref 3–16)
AST SERPL-CCNC: 17 U/L (ref 15–37)
BASE EXCESS BLDV CALC-SCNC: 3.1 MMOL/L (ref -3–3)
BASOPHILS # BLD: 0 K/UL (ref 0–0.2)
BASOPHILS NFR BLD: 0.4 %
BILIRUB DIRECT SERPL-MCNC: <0.1 MG/DL (ref 0–0.3)
BILIRUB INDIRECT SERPL-MCNC: 0.2 MG/DL (ref 0–1)
BILIRUB SERPL-MCNC: 0.3 MG/DL (ref 0–1)
BILIRUB UR QL STRIP.AUTO: ABNORMAL
BUN SERPL-MCNC: 22 MG/DL (ref 7–20)
CALCIUM SERPL-MCNC: 9.2 MG/DL (ref 8.3–10.6)
CHLORIDE SERPL-SCNC: 95 MMOL/L (ref 99–110)
CLARITY UR: CLEAR
CO2 BLDV-SCNC: 28 MMOL/L
CO2 SERPL-SCNC: 25 MMOL/L (ref 21–32)
COLOR UR: YELLOW
CREAT SERPL-MCNC: 1 MG/DL (ref 0.6–1.2)
DEPRECATED RDW RBC AUTO: 13 % (ref 12.4–15.4)
EOSINOPHIL # BLD: 0.1 K/UL (ref 0–0.6)
EOSINOPHIL NFR BLD: 0.9 %
EPI CELLS #/AREA URNS HPF: NORMAL /HPF (ref 0–5)
GFR SERPLBLD CREATININE-BSD FMLA CKD-EPI: 63 ML/MIN/{1.73_M2}
GLUCOSE SERPL-MCNC: 99 MG/DL (ref 70–99)
GLUCOSE UR STRIP.AUTO-MCNC: NEGATIVE MG/DL
HCO3 BLDV-SCNC: 28.4 MMOL/L (ref 23–29)
HCT VFR BLD AUTO: 47.6 % (ref 36–48)
HGB BLD-MCNC: 16.2 G/DL (ref 12–16)
HGB UR QL STRIP.AUTO: ABNORMAL
IMM GRANULOCYTES # BLD: 0 K/UL (ref 0–0.2)
IMM GRANULOCYTES NFR BLD: 0.2 %
KETONES UR STRIP.AUTO-MCNC: 40 MG/DL
LACTATE BLDV-SCNC: 1.6 MMOL/L (ref 0.4–2)
LEUKOCYTE ESTERASE UR QL STRIP.AUTO: NEGATIVE
LIPASE SERPL-CCNC: 15 U/L (ref 13–60)
LYMPHOCYTES # BLD: 2.4 K/UL (ref 1–5.1)
LYMPHOCYTES NFR BLD: 21 %
MCH RBC QN AUTO: 31.6 PG (ref 26–34)
MCHC RBC AUTO-ENTMCNC: 34 G/DL (ref 32–36.4)
MCV RBC AUTO: 92.8 FL (ref 80–100)
MONOCYTES # BLD: 1.3 K/UL (ref 0–1.3)
MONOCYTES NFR BLD: 11.7 %
NEUTROPHILS # BLD: 7.4 K/UL (ref 1.7–7.7)
NEUTROPHILS NFR BLD: 65.8 %
NITRITE UR QL STRIP.AUTO: NEGATIVE
O2 THERAPY: ABNORMAL
PCO2 BLDV: 48.8 MMHG (ref 40–50)
PH BLDV: 7.37 [PH] (ref 7.35–7.45)
PH UR STRIP.AUTO: 6 [PH] (ref 5–8)
PLATELET # BLD AUTO: 341 K/UL (ref 135–450)
PMV BLD AUTO: 8.3 FL (ref 5–10.5)
PO2 BLDV: 32.7 MMHG (ref 25–40)
POTASSIUM SERPL-SCNC: 3.7 MMOL/L (ref 3.5–5.1)
PROT SERPL-MCNC: 6.9 G/DL (ref 6.4–8.2)
PROT UR STRIP.AUTO-MCNC: 30 MG/DL
RBC # BLD AUTO: 5.13 M/UL (ref 4–5.2)
RBC #/AREA URNS HPF: NORMAL /HPF (ref 0–4)
SAO2 % BLDV: 60 %
SODIUM SERPL-SCNC: 133 MMOL/L (ref 136–145)
SP GR UR STRIP.AUTO: 1.02 (ref 1–1.03)
UA COMPLETE W REFLEX CULTURE PNL UR: ABNORMAL
UA DIPSTICK W REFLEX MICRO PNL UR: YES
URN SPEC COLLECT METH UR: ABNORMAL
UROBILINOGEN UR STRIP-ACNC: 0.2 E.U./DL
WBC # BLD AUTO: 11.2 K/UL (ref 4–11)
WBC #/AREA URNS HPF: NORMAL /HPF (ref 0–5)

## 2025-04-26 PROCEDURE — 2580000003 HC RX 258: Performed by: EMERGENCY MEDICINE

## 2025-04-26 PROCEDURE — 96374 THER/PROPH/DIAG INJ IV PUSH: CPT

## 2025-04-26 PROCEDURE — 80076 HEPATIC FUNCTION PANEL: CPT

## 2025-04-26 PROCEDURE — 82803 BLOOD GASES ANY COMBINATION: CPT

## 2025-04-26 PROCEDURE — 36415 COLL VENOUS BLD VENIPUNCTURE: CPT

## 2025-04-26 PROCEDURE — 96375 TX/PRO/DX INJ NEW DRUG ADDON: CPT

## 2025-04-26 PROCEDURE — 96361 HYDRATE IV INFUSION ADD-ON: CPT

## 2025-04-26 PROCEDURE — 99284 EMERGENCY DEPT VISIT MOD MDM: CPT

## 2025-04-26 PROCEDURE — 85025 COMPLETE CBC W/AUTO DIFF WBC: CPT

## 2025-04-26 PROCEDURE — 83605 ASSAY OF LACTIC ACID: CPT

## 2025-04-26 PROCEDURE — 6360000002 HC RX W HCPCS: Performed by: EMERGENCY MEDICINE

## 2025-04-26 PROCEDURE — 80048 BASIC METABOLIC PNL TOTAL CA: CPT

## 2025-04-26 PROCEDURE — 81001 URINALYSIS AUTO W/SCOPE: CPT

## 2025-04-26 PROCEDURE — 83690 ASSAY OF LIPASE: CPT

## 2025-04-26 RX ORDER — KETOROLAC TROMETHAMINE 15 MG/ML
15 INJECTION, SOLUTION INTRAMUSCULAR; INTRAVENOUS ONCE
Status: COMPLETED | OUTPATIENT
Start: 2025-04-26 | End: 2025-04-26

## 2025-04-26 RX ORDER — OMEPRAZOLE 40 MG/1
40 CAPSULE, DELAYED RELEASE ORAL
Qty: 30 CAPSULE | Refills: 0 | Status: SHIPPED | OUTPATIENT
Start: 2025-04-26

## 2025-04-26 RX ORDER — DROPERIDOL 2.5 MG/ML
0.62 INJECTION, SOLUTION INTRAMUSCULAR; INTRAVENOUS ONCE
Status: COMPLETED | OUTPATIENT
Start: 2025-04-26 | End: 2025-04-26

## 2025-04-26 RX ORDER — 0.9 % SODIUM CHLORIDE 0.9 %
1000 INTRAVENOUS SOLUTION INTRAVENOUS ONCE
Status: COMPLETED | OUTPATIENT
Start: 2025-04-26 | End: 2025-04-26

## 2025-04-26 RX ORDER — SODIUM CHLORIDE, SODIUM LACTATE, POTASSIUM CHLORIDE, AND CALCIUM CHLORIDE .6; .31; .03; .02 G/100ML; G/100ML; G/100ML; G/100ML
1000 INJECTION, SOLUTION INTRAVENOUS ONCE
Status: COMPLETED | OUTPATIENT
Start: 2025-04-26 | End: 2025-04-26

## 2025-04-26 RX ORDER — METOCLOPRAMIDE 10 MG/1
10 TABLET ORAL
Qty: 120 TABLET | Refills: 0 | Status: SHIPPED | OUTPATIENT
Start: 2025-04-26

## 2025-04-26 RX ADMIN — SODIUM CHLORIDE 1000 ML: 0.9 INJECTION, SOLUTION INTRAVENOUS at 16:07

## 2025-04-26 RX ADMIN — SODIUM CHLORIDE, POTASSIUM CHLORIDE, SODIUM LACTATE AND CALCIUM CHLORIDE 1000 ML: 600; 310; 30; 20 INJECTION, SOLUTION INTRAVENOUS at 16:37

## 2025-04-26 RX ADMIN — DROPERIDOL 0.62 MG: 2.5 INJECTION, SOLUTION INTRAMUSCULAR; INTRAVENOUS at 16:08

## 2025-04-26 RX ADMIN — KETOROLAC TROMETHAMINE 15 MG: 15 INJECTION, SOLUTION INTRAMUSCULAR; INTRAVENOUS at 16:08

## 2025-04-26 ASSESSMENT — PAIN DESCRIPTION - DESCRIPTORS
DESCRIPTORS: ACHING
DESCRIPTORS: ACHING

## 2025-04-26 ASSESSMENT — PAIN DESCRIPTION - LOCATION
LOCATION: FLANK
LOCATION: FLANK
LOCATION: BACK;FLANK

## 2025-04-26 ASSESSMENT — PAIN - FUNCTIONAL ASSESSMENT: PAIN_FUNCTIONAL_ASSESSMENT: 0-10

## 2025-04-26 ASSESSMENT — PAIN DESCRIPTION - ORIENTATION: ORIENTATION: RIGHT;LEFT

## 2025-04-26 ASSESSMENT — PAIN SCALES - GENERAL
PAINLEVEL_OUTOF10: 8
PAINLEVEL_OUTOF10: 4
PAINLEVEL_OUTOF10: 2

## 2025-04-26 NOTE — ED PROVIDER NOTES
[facility] EMERGENCY DEPARTMENT    Pt Name: Piper Walker   MRN: [id number]   Birthdate 1960   Date of evaluation: 4/26/2025   Provider: Deric Laughlin MD   PCP: Xavier Herrera MD   Note Started: 3:55 PM EDT 4/26/25       CHIEF COMPLAINT  Flank Pain (Bilateral flank pain since Wednesday, right slight worse than left )       HISTORY OF PRESENT ILLNESS  Piper Walker is a 65 y.o. female who  has a past medical history of Adrenal mass, Aneurysm, cerebral, Cerebral artery occlusion with cerebral infarction (HCC), Depression, Diverticulosis, Liver mass, Rectal bleed, and Seizures (HCC).   who presents to the ED complain of abdominal pain nausea vomiting diarrhea.  Patient states she started feeling ill 3 days ago on Wednesday with abdominal cramps and then nausea vomiting with dry heaves.  She is actually only vomited about 3 times a day and she is not having any diarrhea.  She has concerns that she has a urinary tract infection.  She does have some chronic low back pain.  She has had several presentations in the past for back pain with concerns for urinary tract infection.  She denies alcohol use.  She is a smoker of both nicotine and she also smokes marijuana daily.    I have reviewed the following from the nursing documentation:        HISTORY :      Past Medical History:   Diagnosis Date    Adrenal mass     Aneurysm, cerebral     Cerebral artery occlusion with cerebral infarction (HCC)     Depression     Diverticulosis     Liver mass     Rectal bleed     Seizures (HCC)      Past Surgical History:   Procedure Laterality Date    BRAIN SURGERY      Aneurism surgery - 2 aneurisms repaired - 1 remains    BREAST SURGERY Right     biopsy    COLONOSCOPY      TUBAL LIGATION       Family History   Problem Relation Age of Onset    Cancer Mother 37        ovarian cancer    Heart Disease Maternal Uncle         MI    High Blood Pressure Maternal Uncle     Cancer Maternal Grandmother     Cancer Maternal

## 2025-05-07 ENCOUNTER — APPOINTMENT (OUTPATIENT)
Dept: CT IMAGING | Age: 65
End: 2025-05-07

## 2025-05-07 ENCOUNTER — HOSPITAL ENCOUNTER (INPATIENT)
Age: 65
LOS: 3 days | Discharge: HOME | End: 2025-05-10
Attending: EMERGENCY MEDICINE | Admitting: STUDENT IN AN ORGANIZED HEALTH CARE EDUCATION/TRAINING PROGRAM

## 2025-05-07 DIAGNOSIS — K57.92 DIVERTICULITIS: Primary | ICD-10-CM

## 2025-05-07 PROBLEM — A41.9 SEPSIS WITHOUT ACUTE ORGAN DYSFUNCTION, DUE TO UNSPECIFIED ORGANISM (HCC): Status: ACTIVE | Noted: 2025-05-07

## 2025-05-07 PROBLEM — Z72.0 TOBACCO ABUSE: Status: ACTIVE | Noted: 2025-05-07

## 2025-05-07 PROBLEM — K57.32 SIGMOID DIVERTICULITIS: Status: ACTIVE | Noted: 2025-05-07

## 2025-05-07 LAB
ALBUMIN SERPL-MCNC: 3.8 G/DL (ref 3.4–5)
ALBUMIN/GLOB SERPL: 1.2 {RATIO} (ref 1.1–2.2)
ALP SERPL-CCNC: 66 U/L (ref 40–129)
ALT SERPL-CCNC: 10 U/L (ref 10–40)
ANION GAP SERPL CALCULATED.3IONS-SCNC: 12 MMOL/L (ref 3–16)
AST SERPL-CCNC: 9 U/L (ref 15–37)
BACTERIA URNS QL MICRO: ABNORMAL /HPF
BASOPHILS # BLD: 0 K/UL (ref 0–0.2)
BASOPHILS NFR BLD: 0 %
BILIRUB SERPL-MCNC: 0.4 MG/DL (ref 0–1)
BILIRUB UR QL STRIP.AUTO: ABNORMAL
BUN SERPL-MCNC: 11 MG/DL (ref 7–20)
CALCIUM SERPL-MCNC: 8.7 MG/DL (ref 8.3–10.6)
CHARACTER UR: ABNORMAL
CHLORIDE SERPL-SCNC: 99 MMOL/L (ref 99–110)
CLARITY UR: CLEAR
CO2 SERPL-SCNC: 23 MMOL/L (ref 21–32)
COLOR UR: YELLOW
CREAT SERPL-MCNC: 0.9 MG/DL (ref 0.6–1.2)
DEPRECATED RDW RBC AUTO: 13.3 % (ref 12.4–15.4)
EKG ATRIAL RATE: 88 BPM
EKG DIAGNOSIS: NORMAL
EKG P AXIS: 84 DEGREES
EKG P-R INTERVAL: 174 MS
EKG Q-T INTERVAL: 338 MS
EKG QRS DURATION: 70 MS
EKG QTC CALCULATION (BAZETT): 408 MS
EKG R AXIS: 82 DEGREES
EKG T AXIS: 79 DEGREES
EKG VENTRICULAR RATE: 88 BPM
EOSINOPHIL # BLD: 0 K/UL (ref 0–0.6)
EOSINOPHIL NFR BLD: 0 %
EPI CELLS #/AREA URNS HPF: ABNORMAL /HPF (ref 0–5)
GFR SERPLBLD CREATININE-BSD FMLA CKD-EPI: 72 ML/MIN/{1.73_M2}
GLUCOSE SERPL-MCNC: 111 MG/DL (ref 70–99)
GLUCOSE UR STRIP.AUTO-MCNC: NEGATIVE MG/DL
HCT VFR BLD AUTO: 41.7 % (ref 36–48)
HGB BLD-MCNC: 13.9 G/DL (ref 12–16)
HGB UR QL STRIP.AUTO: ABNORMAL
IMM GRANULOCYTES NFR BLD: 0.2 %
INR PPP: 0.95 (ref 0.85–1.15)
KETONES UR STRIP.AUTO-MCNC: 80 MG/DL
LACTATE BLDV-SCNC: 0.6 MMOL/L (ref 0.4–1.9)
LACTATE BLDV-SCNC: 0.8 MMOL/L (ref 0.4–1.9)
LEUKOCYTE ESTERASE UR QL STRIP.AUTO: ABNORMAL
LIPASE SERPL-CCNC: 15 U/L (ref 13–60)
LYMPHOCYTES # BLD: 2.7 K/UL (ref 1–5.1)
LYMPHOCYTES NFR BLD: 7 %
MCH RBC QN AUTO: 31 PG (ref 26–34)
MCHC RBC AUTO-ENTMCNC: 33.3 G/DL (ref 32–36.4)
MCV RBC AUTO: 92.9 FL (ref 80–100)
MONOCYTES # BLD: 1.9 K/UL (ref 0–1.3)
MONOCYTES NFR BLD: 9 %
MUCOUS THREADS #/AREA URNS LPF: ABNORMAL /LPF
NEUTROPHILS # BLD: 16.2 K/UL (ref 1.7–7.7)
NEUTROPHILS NFR BLD: 77 %
NEUTS BAND NFR BLD MANUAL: 1 % (ref 0–7)
NITRITE UR QL STRIP.AUTO: POSITIVE
PATH INTERP BLD-IMP: YES
PH UR STRIP.AUTO: 7 [PH] (ref 5–8)
PLATELET # BLD AUTO: 419 K/UL (ref 135–450)
PLATELET BLD QL SMEAR: ABNORMAL
PMV BLD AUTO: 8.5 FL (ref 5–10.5)
POTASSIUM SERPL-SCNC: 3.9 MMOL/L (ref 3.5–5.1)
PROT SERPL-MCNC: 6.9 G/DL (ref 6.4–8.2)
PROT UR STRIP.AUTO-MCNC: 100 MG/DL
PROTHROMBIN TIME: 12.9 SEC (ref 11.9–14.9)
RBC # BLD AUTO: 4.49 M/UL (ref 4–5.2)
RBC #/AREA URNS HPF: ABNORMAL /HPF (ref 0–4)
SLIDE REVIEW: ABNORMAL
SODIUM SERPL-SCNC: 134 MMOL/L (ref 136–145)
SP GR UR STRIP.AUTO: 1.02 (ref 1–1.03)
TOXIC GRANULES BLD QL SMEAR: PRESENT
UA COMPLETE W REFLEX CULTURE PNL UR: YES
UA DIPSTICK W REFLEX MICRO PNL UR: YES
URN SPEC COLLECT METH UR: ABNORMAL
UROBILINOGEN UR STRIP-ACNC: 0.2 E.U./DL
VARIANT LYMPHS NFR BLD MANUAL: 6 % (ref 0–6)
WBC # BLD AUTO: 20.8 K/UL (ref 4–11)
WBC #/AREA URNS HPF: ABNORMAL /HPF (ref 0–5)

## 2025-05-07 PROCEDURE — APPSS15 APP SPLIT SHARED TIME 0-15 MINUTES: Performed by: PHYSICIAN ASSISTANT

## 2025-05-07 PROCEDURE — 85610 PROTHROMBIN TIME: CPT

## 2025-05-07 PROCEDURE — 96374 THER/PROPH/DIAG INJ IV PUSH: CPT

## 2025-05-07 PROCEDURE — 94760 N-INVAS EAR/PLS OXIMETRY 1: CPT

## 2025-05-07 PROCEDURE — 2580000003 HC RX 258: Performed by: EMERGENCY MEDICINE

## 2025-05-07 PROCEDURE — 6360000002 HC RX W HCPCS: Performed by: EMERGENCY MEDICINE

## 2025-05-07 PROCEDURE — 2500000003 HC RX 250 WO HCPCS: Performed by: HOSPITALIST

## 2025-05-07 PROCEDURE — 83690 ASSAY OF LIPASE: CPT

## 2025-05-07 PROCEDURE — 93010 ELECTROCARDIOGRAM REPORT: CPT | Performed by: INTERNAL MEDICINE

## 2025-05-07 PROCEDURE — 85025 COMPLETE CBC W/AUTO DIFF WBC: CPT

## 2025-05-07 PROCEDURE — 87040 BLOOD CULTURE FOR BACTERIA: CPT

## 2025-05-07 PROCEDURE — APPNB15 APP NON BILLABLE TIME 0-15 MINS: Performed by: PHYSICIAN ASSISTANT

## 2025-05-07 PROCEDURE — 99285 EMERGENCY DEPT VISIT HI MDM: CPT

## 2025-05-07 PROCEDURE — 83605 ASSAY OF LACTIC ACID: CPT

## 2025-05-07 PROCEDURE — 96375 TX/PRO/DX INJ NEW DRUG ADDON: CPT

## 2025-05-07 PROCEDURE — 87086 URINE CULTURE/COLONY COUNT: CPT

## 2025-05-07 PROCEDURE — 1200000000 HC SEMI PRIVATE

## 2025-05-07 PROCEDURE — 99222 1ST HOSP IP/OBS MODERATE 55: CPT | Performed by: SURGERY

## 2025-05-07 PROCEDURE — 81001 URINALYSIS AUTO W/SCOPE: CPT

## 2025-05-07 PROCEDURE — 2580000003 HC RX 258: Performed by: HOSPITALIST

## 2025-05-07 PROCEDURE — 36415 COLL VENOUS BLD VENIPUNCTURE: CPT

## 2025-05-07 PROCEDURE — 80053 COMPREHEN METABOLIC PANEL: CPT

## 2025-05-07 PROCEDURE — 6360000002 HC RX W HCPCS: Performed by: HOSPITALIST

## 2025-05-07 PROCEDURE — 74176 CT ABD & PELVIS W/O CONTRAST: CPT

## 2025-05-07 PROCEDURE — 6360000002 HC RX W HCPCS: Performed by: STUDENT IN AN ORGANIZED HEALTH CARE EDUCATION/TRAINING PROGRAM

## 2025-05-07 PROCEDURE — 93005 ELECTROCARDIOGRAM TRACING: CPT | Performed by: STUDENT IN AN ORGANIZED HEALTH CARE EDUCATION/TRAINING PROGRAM

## 2025-05-07 RX ORDER — ACETAMINOPHEN 325 MG/1
650 TABLET ORAL EVERY 6 HOURS PRN
Status: DISCONTINUED | OUTPATIENT
Start: 2025-05-07 | End: 2025-05-09

## 2025-05-07 RX ORDER — SODIUM CHLORIDE 9 MG/ML
INJECTION, SOLUTION INTRAVENOUS PRN
Status: DISCONTINUED | OUTPATIENT
Start: 2025-05-07 | End: 2025-05-10 | Stop reason: HOSPADM

## 2025-05-07 RX ORDER — METRONIDAZOLE 500 MG/100ML
500 INJECTION, SOLUTION INTRAVENOUS ONCE
Status: COMPLETED | OUTPATIENT
Start: 2025-05-07 | End: 2025-05-07

## 2025-05-07 RX ORDER — DROPERIDOL 2.5 MG/ML
1.25 INJECTION, SOLUTION INTRAMUSCULAR; INTRAVENOUS ONCE
Status: COMPLETED | OUTPATIENT
Start: 2025-05-07 | End: 2025-05-07

## 2025-05-07 RX ORDER — SODIUM CHLORIDE 0.9 % (FLUSH) 0.9 %
5-40 SYRINGE (ML) INJECTION EVERY 12 HOURS SCHEDULED
Status: DISCONTINUED | OUTPATIENT
Start: 2025-05-07 | End: 2025-05-10 | Stop reason: HOSPADM

## 2025-05-07 RX ORDER — 0.9 % SODIUM CHLORIDE 0.9 %
1000 INTRAVENOUS SOLUTION INTRAVENOUS ONCE
Status: COMPLETED | OUTPATIENT
Start: 2025-05-07 | End: 2025-05-07

## 2025-05-07 RX ORDER — SODIUM CHLORIDE 0.9 % (FLUSH) 0.9 %
5-40 SYRINGE (ML) INJECTION PRN
Status: DISCONTINUED | OUTPATIENT
Start: 2025-05-07 | End: 2025-05-10 | Stop reason: HOSPADM

## 2025-05-07 RX ORDER — MORPHINE SULFATE 4 MG/ML
4 INJECTION, SOLUTION INTRAMUSCULAR; INTRAVENOUS
Refills: 0 | Status: COMPLETED | OUTPATIENT
Start: 2025-05-07 | End: 2025-05-07

## 2025-05-07 RX ORDER — SODIUM CHLORIDE, SODIUM LACTATE, POTASSIUM CHLORIDE, CALCIUM CHLORIDE 600; 310; 30; 20 MG/100ML; MG/100ML; MG/100ML; MG/100ML
INJECTION, SOLUTION INTRAVENOUS CONTINUOUS
Status: DISCONTINUED | OUTPATIENT
Start: 2025-05-07 | End: 2025-05-08

## 2025-05-07 RX ORDER — ACETAMINOPHEN 650 MG/1
650 SUPPOSITORY RECTAL EVERY 6 HOURS PRN
Status: DISCONTINUED | OUTPATIENT
Start: 2025-05-07 | End: 2025-05-09

## 2025-05-07 RX ORDER — METRONIDAZOLE 500 MG/100ML
500 INJECTION, SOLUTION INTRAVENOUS EVERY 8 HOURS
Status: DISCONTINUED | OUTPATIENT
Start: 2025-05-07 | End: 2025-05-07

## 2025-05-07 RX ORDER — KETOROLAC TROMETHAMINE 15 MG/ML
15 INJECTION, SOLUTION INTRAMUSCULAR; INTRAVENOUS ONCE
Status: COMPLETED | OUTPATIENT
Start: 2025-05-07 | End: 2025-05-07

## 2025-05-07 RX ADMIN — HYDROMORPHONE HYDROCHLORIDE 0.5 MG: 1 INJECTION, SOLUTION INTRAMUSCULAR; INTRAVENOUS; SUBCUTANEOUS at 23:04

## 2025-05-07 RX ADMIN — DROPERIDOL 1.25 MG: 2.5 INJECTION, SOLUTION INTRAMUSCULAR; INTRAVENOUS at 04:16

## 2025-05-07 RX ADMIN — PIPERACILLIN AND TAZOBACTAM 3375 MG: 3; .375 INJECTION, POWDER, LYOPHILIZED, FOR SOLUTION INTRAVENOUS at 20:29

## 2025-05-07 RX ADMIN — METRONIDAZOLE 500 MG: 500 INJECTION, SOLUTION INTRAVENOUS at 06:38

## 2025-05-07 RX ADMIN — PIPERACILLIN AND TAZOBACTAM 3375 MG: 3; .375 INJECTION, POWDER, LYOPHILIZED, FOR SOLUTION INTRAVENOUS at 12:39

## 2025-05-07 RX ADMIN — HYDROMORPHONE HYDROCHLORIDE 0.5 MG: 1 INJECTION, SOLUTION INTRAMUSCULAR; INTRAVENOUS; SUBCUTANEOUS at 18:29

## 2025-05-07 RX ADMIN — SODIUM CHLORIDE, PRESERVATIVE FREE 10 ML: 5 INJECTION INTRAVENOUS at 09:31

## 2025-05-07 RX ADMIN — SODIUM CHLORIDE 1000 ML: 0.9 INJECTION, SOLUTION INTRAVENOUS at 04:14

## 2025-05-07 RX ADMIN — KETOROLAC TROMETHAMINE 15 MG: 15 INJECTION, SOLUTION INTRAMUSCULAR; INTRAVENOUS at 04:15

## 2025-05-07 RX ADMIN — PIPERACILLIN AND TAZOBACTAM 3375 MG: 3; .375 INJECTION, POWDER, LYOPHILIZED, FOR SOLUTION INTRAVENOUS at 06:04

## 2025-05-07 RX ADMIN — MORPHINE SULFATE 4 MG: 4 INJECTION, SOLUTION INTRAMUSCULAR; INTRAVENOUS at 05:30

## 2025-05-07 RX ADMIN — SODIUM CHLORIDE, SODIUM LACTATE, POTASSIUM CHLORIDE, AND CALCIUM CHLORIDE: .6; .31; .03; .02 INJECTION, SOLUTION INTRAVENOUS at 09:30

## 2025-05-07 ASSESSMENT — PAIN SCALES - GENERAL
PAINLEVEL_OUTOF10: 2
PAINLEVEL_OUTOF10: 2
PAINLEVEL_OUTOF10: 0
PAINLEVEL_OUTOF10: 4
PAINLEVEL_OUTOF10: 2
PAINLEVEL_OUTOF10: 8
PAINLEVEL_OUTOF10: 7
PAINLEVEL_OUTOF10: 4

## 2025-05-07 ASSESSMENT — PAIN DESCRIPTION - PAIN TYPE
TYPE: ACUTE PAIN

## 2025-05-07 ASSESSMENT — PAIN DESCRIPTION - LOCATION
LOCATION: ABDOMEN

## 2025-05-07 ASSESSMENT — PAIN - FUNCTIONAL ASSESSMENT
PAIN_FUNCTIONAL_ASSESSMENT: ACTIVITIES ARE NOT PREVENTED
PAIN_FUNCTIONAL_ASSESSMENT: 0-10
PAIN_FUNCTIONAL_ASSESSMENT: ACTIVITIES ARE NOT PREVENTED
PAIN_FUNCTIONAL_ASSESSMENT: PREVENTS OR INTERFERES SOME ACTIVE ACTIVITIES AND ADLS
PAIN_FUNCTIONAL_ASSESSMENT: 0-10
PAIN_FUNCTIONAL_ASSESSMENT: 0-10

## 2025-05-07 ASSESSMENT — PAIN DESCRIPTION - FREQUENCY
FREQUENCY: INTERMITTENT
FREQUENCY: CONTINUOUS

## 2025-05-07 ASSESSMENT — PAIN DESCRIPTION - ORIENTATION
ORIENTATION: RIGHT;LEFT;LOWER
ORIENTATION: MID
ORIENTATION: LOWER
ORIENTATION: LOWER

## 2025-05-07 ASSESSMENT — PAIN DESCRIPTION - DESCRIPTORS
DESCRIPTORS: OTHER (COMMENT)
DESCRIPTORS: CRAMPING
DESCRIPTORS: ACHING;CRAMPING

## 2025-05-07 ASSESSMENT — PAIN DESCRIPTION - ONSET
ONSET: SUDDEN
ONSET: ON-GOING

## 2025-05-07 ASSESSMENT — LIFESTYLE VARIABLES: HOW OFTEN DO YOU HAVE A DRINK CONTAINING ALCOHOL: NEVER

## 2025-05-07 NOTE — ED NOTES
Pt spouse is taking all belongings and cell phone w/ him to [place]. Pt will not be arriving to [place] [address] w/ any belonging.

## 2025-05-07 NOTE — PROGRESS NOTES
4 Eyes Skin Assessment     NAME:  Piper Walker  YOB: 1960  MEDICAL RECORD NUMBER:  [id number]    The patient is being assessed for  Admission    I agree that at least one RN has performed a thorough Head to Toe Skin Assessment on the patient. ALL assessment sites listed below have been assessed.      Areas assessed by both nurses:    Head, Face, Ears, Shoulders, Back, Chest, Arms, Elbows, Hands, Sacrum. Buttock, Coccyx, Ischium, Legs. Feet and Heels, and Under Medical Devices         Does the Patient have a Wound? No noted wound(s)       Jesus Prevention initiated by RN: No  Wound Care Orders initiated by RN: No    Pressure Injury (Stage 3,4, Unstageable, DTI, NWPT, and Complex wounds) if present, place Wound referral order by RN under : No    New Ostomies, if present place, Ostomy referral order under : No     Nurse 1 eSignature: Electronically signed by Esperanza Diop RN on 5/7/25 at 11:53 AM EDT    **SHARE this note so that the co-signing nurse can place an eSignature**    Nurse 2 eSignature: Electronically signed by Tasha Ko RN on 5/7/25 at 12:21 PM EDT

## 2025-05-07 NOTE — PROGRESS NOTES
Patient admitted to room 4268 from ED. Patient is alert and oriented x4, denies pain at this time; complains of pain rated 5 and tolerable at this time. Patient is room air.. IV in place and patent. Skin assessment completed with no open areas . Patient has been oriented to room and floor policies. Care plan reviewed with patient/ and family .Fall precautions in place at this time. Call light within reach bed low position, bed alarm on. Patient has been encouraged to call when ambulating. ongoing monitoring

## 2025-05-07 NOTE — ED TRIAGE NOTES
Pt to ED in wheelchair per boyfriend. Pt states she has had lower abdominal pain x 2 days. Denies nausea, vomiting, diarrhea. States unsure of fever. States unsure of her last BM but took Miralax yesterday ad had very small formed stool today. Pt awake, alert. Skin warm, dry. Resp appear unlabored.

## 2025-05-07 NOTE — ED NOTES
Report received from Anne-Marie,RN  Pt laying in bed with eyes closed,pain manageable, even respirations, no distress noted. VSS, NSR  Bed in lowest position, call light within reach, side rails down per request from pt per nightshift RN.  Plan of care ongoing.     Family denies any needs at this time.

## 2025-05-07 NOTE — H&P
V2.0  History and Physical      Name:  Piper Walker /Age/Sex: 1960  (65 y.o. female)   MRN & CSN:  [id number] & [id number] Encounter Date/Time: 2025 9:53 AM EDT   Location:  [id number] PCP: Tata Loredo APRN - CNP       Hospital Day: 1    Assessment and Plan:   Piper Walker is a 65 y.o. female with a pertinent PMHx of CVA, seizures who presented complaining of 3-day history of worsening abdominal pain, subjective fevers, decreased appetite.  Imaging in the ED was concerning for perforated diverticulitis.    Acute diverticulitis with perforation  - No evidence of acute abdomen on CT abdomen pelvis without pneumoperitoneum  - Blood cultures in process  - IV fluid resuscitation  - IV antibiotics with Zosyn  - Check INR and EKG in case of need for surgical intervention  - Maintain n.p.o.  - General Surgery consulted  - If acute worsening of abdominal pain will obtain KUB to evaluate for free air    Disposition:   Current Living situation: Home  Expected Disposition: Home  Estimated D/C: 5/10    Diet Diet NPO   DVT Prophylaxis [] Lovenox, []  Heparin, [] SCDs, [] Ambulation,  [] Eliquis, [] Xarelto, [] Coumadin   Code Status Full Code         Medical Decision Making:  The following items were considered in medical decision making:  Discussion of patient care with other providers, including ED provider via [facility] who recommended inpatient mission  Reviewed clinical lab tests such as BMP, liver profile, CBC  Reviewed radiology tests  Reviewed other diagnostic tests/interventions       History from:     patient and family member at bedside    History of Present Illness:     Chief Complaint: Abdominal pain  Piper Walker is a 65 y.o. female with a pertinent PMHx of CVA, seizures who presented complaining of 3-day history of abdominal pain with constipation as well as decreased appetite with subjective fevers.  1 week prior to this had an episode of several days of nausea and vomiting however  perforated diverticulitis. Gas and stool filled distension of the proximal colon. Pelvis: Bladder is decompressed. Peritoneum/Retroperitoneum: No retroperitoneal, mesenteric, or pelvic lymphadenopathy.  Small volume pelvic free fluid, reactive.  No pneumoperitoneum.  The abdominal aorta is normal in caliber. Bones/Soft Tissues: No acute osseous or soft tissue abnormality.     1. Extensive pelvic inflammatory stranding centered in the region of the distal sigmoid colon with a tract extending to an adjacent gas containing collection. Findings are most compatible with perforated diverticulitis and probable abscess formation.  Repeat CT could be performed with oral and IV contrast for further delineation if clinically warranted. 2. Gas and stool filled distension of the proximal colon.         Electronically signed by Evaristo Rivera DO on 5/7/2025 at 9:53 AM

## 2025-05-07 NOTE — ED PROVIDER NOTES
CHIEF COMPLAINT  Chief Complaint   Patient presents with    Abdominal Pain     Abdominal pain x 2 days. No vomiting/ diarrhea       HISTORY OF PRESENT ILLNESS  Piper Walker is a 65 y.o. female who presents to the ED complaining of 10-day history of abdominal pain for which she was seen in the emergency department approximately 10 days ago with similar presentation of lower abdominal pain associated with nausea and vomiting.  Patient underwent laboratory workup at that time that was unremarkable and felt better after interventions in the emergency department with a presumptive diagnosis of marijuana hyperemesis syndrome.  Patient returns tonight with 2 days history of increased lower abdominal cramping with last bowel movement yesterday without blood.  No diarrhea.  No current nausea or vomiting.  No back pain.  No dysuria or hematuria.  Abdominal surgery history significant for ruptured ectopic pregnancy in the distant past.  No fevers or chills.  Patient continues to smoke marijuana daily    No other complaints, modifying factors or associated symptoms.     Nursing notes reviewed.   Past Medical History:   Diagnosis Date    Adrenal mass     Aneurysm, cerebral     Cerebral artery occlusion with cerebral infarction (HCC)     Depression     Diverticulosis     Liver mass     Rectal bleed     Seizures (HCC)      Past Surgical History:   Procedure Laterality Date    BRAIN SURGERY      Aneurism surgery - 2 aneurisms repaired - 1 remains    BREAST SURGERY Right     biopsy    COLONOSCOPY      TUBAL LIGATION       Family History   Problem Relation Age of Onset    Cancer Mother 37        ovarian cancer    Heart Disease Maternal Uncle         MI    High Blood Pressure Maternal Uncle     Cancer Maternal Grandmother     Cancer Maternal Grandfather         Prostate     Social History     Socioeconomic History    Marital status:      Spouse name: Not on file    Number of children: Not on file    Years of education: Not on

## 2025-05-07 NOTE — CONSULTS
Surgery Consult Note     Juanpablo Rae PA-C  Pt Name: Piper Walker  MRN: [id number]  YOB: 1960  Date of evaluation: 5/7/2025  Primary Care Physician: Tata Loredo, APRN - CNP  Referred By: Hilario Valente  Reason for Consultation: perforated diverticulitis   Chief Complaint:Abdominal pain  IMPRESSIONS:   Perforated diverticulitis  Leukocytosis: WBC count 20.8  UTI  Denies ever having a colonoscopy in the past.  She denies ever having had diverticulitis in the past. Family bedside stated that she has had an episode in the past.   PLANS:   Monitor and control pain  NPO  IVF  IV antibiotics  Will monitor for diverticulitis to improve and no further ongoing sings of perforation. If symptoms continue. May need further imaging, and intervention. If abscess is formed may need IR drainage. Will contiue to monitor and give further recommendations.   SUBJECTIVE:   History of Chief Complaint:    Piper Walker is a 65 y.o. female who presents with abdominal pain. She stated that the pain started about 2 weeks ago and she had associated nausea and emesis. She was seen in the ED on 4/26/25 and was diagnosed with cannabinoid hyperemesis syndrome. Over the past 2 days the abdominal pain worsened and she came back tot the ER last night and a CT scan was performed and that showed extensive pelvic inflammatory stranding centered in the region of the distal sigmoid colon with a tract extending to an adjacent gas containing collection. She denies having any other pain. She denies ever having a colonoscopy in the past.   Past Medical History  Reviewed  has a past medical history of Adrenal mass, Aneurysm, cerebral, Cerebral artery occlusion with cerebral infarction (HCC), Depression, Diverticulosis, Liver mass, Rectal bleed, and Seizures (HCC).  Past Surgical History  Reviewed has a past surgical history that includes Tubal ligation; brain surgery; Colonoscopy; and Breast surgery (Right).  Medications  Prior to

## 2025-05-08 LAB
ANION GAP SERPL CALCULATED.3IONS-SCNC: 10 MMOL/L (ref 3–16)
BACTERIA UR CULT: NORMAL
BASOPHILS # BLD: 0.1 K/UL (ref 0–0.2)
BASOPHILS NFR BLD: 0.5 %
BUN SERPL-MCNC: 11 MG/DL (ref 7–20)
CALCIUM SERPL-MCNC: 8 MG/DL (ref 8.3–10.6)
CHLORIDE SERPL-SCNC: 107 MMOL/L (ref 99–110)
CO2 SERPL-SCNC: 21 MMOL/L (ref 21–32)
CREAT SERPL-MCNC: 0.6 MG/DL (ref 0.6–1.2)
DEPRECATED RDW RBC AUTO: 13.3 % (ref 12.4–15.4)
EOSINOPHIL # BLD: 0.4 K/UL (ref 0–0.6)
EOSINOPHIL NFR BLD: 3.7 %
GFR SERPLBLD CREATININE-BSD FMLA CKD-EPI: >90 ML/MIN/{1.73_M2}
GLUCOSE SERPL-MCNC: 122 MG/DL (ref 70–99)
HCT VFR BLD AUTO: 35.9 % (ref 36–48)
HGB BLD-MCNC: 12.3 G/DL (ref 12–16)
LYMPHOCYTES # BLD: 1.7 K/UL (ref 1–5.1)
LYMPHOCYTES NFR BLD: 14.5 %
MCH RBC QN AUTO: 31.6 PG (ref 26–34)
MCHC RBC AUTO-ENTMCNC: 34.3 G/DL (ref 31–36)
MCV RBC AUTO: 92.3 FL (ref 80–100)
MONOCYTES # BLD: 1.5 K/UL (ref 0–1.3)
MONOCYTES NFR BLD: 13 %
NEUTROPHILS # BLD: 7.9 K/UL (ref 1.7–7.7)
NEUTROPHILS NFR BLD: 68.3 %
PLATELET # BLD AUTO: 360 K/UL (ref 135–450)
PMV BLD AUTO: 6.9 FL (ref 5–10.5)
POTASSIUM SERPL-SCNC: 3.6 MMOL/L (ref 3.5–5.1)
RBC # BLD AUTO: 3.89 M/UL (ref 4–5.2)
SODIUM SERPL-SCNC: 138 MMOL/L (ref 136–145)
WBC # BLD AUTO: 11.6 K/UL (ref 4–11)

## 2025-05-08 PROCEDURE — APPNB15 APP NON BILLABLE TIME 0-15 MINS: Performed by: PHYSICIAN ASSISTANT

## 2025-05-08 PROCEDURE — 2500000003 HC RX 250 WO HCPCS: Performed by: HOSPITALIST

## 2025-05-08 PROCEDURE — 36415 COLL VENOUS BLD VENIPUNCTURE: CPT

## 2025-05-08 PROCEDURE — 6370000000 HC RX 637 (ALT 250 FOR IP): Performed by: SURGERY

## 2025-05-08 PROCEDURE — 6360000002 HC RX W HCPCS: Performed by: HOSPITALIST

## 2025-05-08 PROCEDURE — 80048 BASIC METABOLIC PNL TOTAL CA: CPT

## 2025-05-08 PROCEDURE — 99232 SBSQ HOSP IP/OBS MODERATE 35: CPT | Performed by: SURGERY

## 2025-05-08 PROCEDURE — 94760 N-INVAS EAR/PLS OXIMETRY 1: CPT

## 2025-05-08 PROCEDURE — 2580000003 HC RX 258: Performed by: HOSPITALIST

## 2025-05-08 PROCEDURE — APPSS15 APP SPLIT SHARED TIME 0-15 MINUTES: Performed by: PHYSICIAN ASSISTANT

## 2025-05-08 PROCEDURE — 85025 COMPLETE CBC W/AUTO DIFF WBC: CPT

## 2025-05-08 PROCEDURE — 6360000002 HC RX W HCPCS: Performed by: STUDENT IN AN ORGANIZED HEALTH CARE EDUCATION/TRAINING PROGRAM

## 2025-05-08 PROCEDURE — 1200000000 HC SEMI PRIVATE

## 2025-05-08 RX ORDER — ENOXAPARIN SODIUM 100 MG/ML
40 INJECTION SUBCUTANEOUS DAILY
Status: DISCONTINUED | OUTPATIENT
Start: 2025-05-08 | End: 2025-05-10 | Stop reason: HOSPADM

## 2025-05-08 RX ORDER — DOCUSATE SODIUM 100 MG/1
100 CAPSULE, LIQUID FILLED ORAL 2 TIMES DAILY
Status: DISCONTINUED | OUTPATIENT
Start: 2025-05-08 | End: 2025-05-10 | Stop reason: HOSPADM

## 2025-05-08 RX ADMIN — PIPERACILLIN AND TAZOBACTAM 3375 MG: 3; .375 INJECTION, POWDER, LYOPHILIZED, FOR SOLUTION INTRAVENOUS at 04:36

## 2025-05-08 RX ADMIN — DOCUSATE SODIUM 100 MG: 100 CAPSULE, LIQUID FILLED ORAL at 15:05

## 2025-05-08 RX ADMIN — SODIUM CHLORIDE, SODIUM LACTATE, POTASSIUM CHLORIDE, AND CALCIUM CHLORIDE: .6; .31; .03; .02 INJECTION, SOLUTION INTRAVENOUS at 10:59

## 2025-05-08 RX ADMIN — HYDROMORPHONE HYDROCHLORIDE 0.5 MG: 1 INJECTION, SOLUTION INTRAMUSCULAR; INTRAVENOUS; SUBCUTANEOUS at 19:03

## 2025-05-08 RX ADMIN — HYDROMORPHONE HYDROCHLORIDE 0.5 MG: 1 INJECTION, SOLUTION INTRAMUSCULAR; INTRAVENOUS; SUBCUTANEOUS at 05:35

## 2025-05-08 RX ADMIN — HYDROMORPHONE HYDROCHLORIDE 0.5 MG: 1 INJECTION, SOLUTION INTRAMUSCULAR; INTRAVENOUS; SUBCUTANEOUS at 10:48

## 2025-05-08 RX ADMIN — PIPERACILLIN AND TAZOBACTAM 3375 MG: 3; .375 INJECTION, POWDER, LYOPHILIZED, FOR SOLUTION INTRAVENOUS at 20:29

## 2025-05-08 RX ADMIN — DOCUSATE SODIUM 100 MG: 100 CAPSULE, LIQUID FILLED ORAL at 20:29

## 2025-05-08 RX ADMIN — ENOXAPARIN SODIUM 40 MG: 100 INJECTION SUBCUTANEOUS at 13:30

## 2025-05-08 RX ADMIN — PIPERACILLIN AND TAZOBACTAM 3375 MG: 3; .375 INJECTION, POWDER, LYOPHILIZED, FOR SOLUTION INTRAVENOUS at 13:30

## 2025-05-08 RX ADMIN — SODIUM CHLORIDE, PRESERVATIVE FREE 10 ML: 5 INJECTION INTRAVENOUS at 20:29

## 2025-05-08 ASSESSMENT — PAIN DESCRIPTION - LOCATION
LOCATION: ABDOMEN

## 2025-05-08 ASSESSMENT — PAIN SCALES - GENERAL
PAINLEVEL_OUTOF10: 7
PAINLEVEL_OUTOF10: 9
PAINLEVEL_OUTOF10: 8
PAINLEVEL_OUTOF10: 5

## 2025-05-08 ASSESSMENT — PAIN DESCRIPTION - DESCRIPTORS
DESCRIPTORS: ACHING;CRAMPING
DESCRIPTORS: CRAMPING
DESCRIPTORS: ACHING

## 2025-05-08 ASSESSMENT — PAIN - FUNCTIONAL ASSESSMENT
PAIN_FUNCTIONAL_ASSESSMENT: ACTIVITIES ARE NOT PREVENTED

## 2025-05-08 ASSESSMENT — PAIN DESCRIPTION - ONSET: ONSET: ON-GOING

## 2025-05-08 ASSESSMENT — PAIN DESCRIPTION - ORIENTATION
ORIENTATION: MID
ORIENTATION: LOWER
ORIENTATION: MID

## 2025-05-08 ASSESSMENT — PAIN DESCRIPTION - PAIN TYPE: TYPE: ACUTE PAIN

## 2025-05-08 ASSESSMENT — PAIN DESCRIPTION - FREQUENCY: FREQUENCY: CONTINUOUS

## 2025-05-08 NOTE — CARE COORDINATION
Case Management Assessment  Initial Evaluation    Date/Time of Evaluation: 5/8/2025 10:42 AM  Assessment Completed by: LEOPOLDO Elkins    If patient is discharged prior to next notation, then this note serves as note for discharge by case management.    Patient Name: Piper Walker                   YOB: 1960  Diagnosis: Diverticulitis [K57.92]  Sepsis without acute organ dysfunction, due to unspecified organism (HCC) [A41.9]                   Date / Time: 5/7/2025  3:34 AM    Patient Admission Status: Inpatient   Readmission Risk (Low < 19, Mod (19-27), High > 27): Readmission Risk Score: 9.1    Current PCP: Tata Loredo APRN - CNP  PCP verified by CM? (P) Yes    Chart Reviewed: Yes      History Provided by: (P) Patient  Patient Orientation: (P) Alert and Oriented    Patient Cognition: (P) Alert    Hospitalization in the last 30 days (Readmission):  No    If yes, Readmission Assessment in CM Navigator will be completed.    Advance Directives:      Code Status: Full Code   Patient's Primary Decision Maker is: (P) Legal Next of Kin      Discharge Planning:    Patient lives with: (P) Spouse/Significant Other Type of Home: (P) House  Primary Care Giver: (P) Self  Patient Support Systems include: (P) Family Members   Current Financial resources: (P) None  Current community resources: (P) None  Current services prior to admission: (P) None            Current DME:              Type of Home Care services:  (P) None    ADLS  Prior functional level: (P) Independent in ADLs/IADLs  Current functional level: (P) Independent in ADLs/IADLs    PT AM-PAC:   /24  OT AM-PAC:   /24    Family can provide assistance at DC: (P) Yes  Would you like Case Management to discuss the discharge plan with any other family members/significant others, and if so, who? (P) No  Plans to Return to Present Housing: (P) Yes  Other Identified Issues/Barriers to RETURNING to current housing: no insurance  Potential Assistance

## 2025-05-08 NOTE — PLAN OF CARE
Problem: Chronic Conditions and Co-morbidities  Goal: Patient's chronic conditions and co-morbidity symptoms are monitored and maintained or improved  Outcome: Progressing     Problem: Discharge Planning  Goal: Discharge to home or other facility with appropriate resources  Outcome: Progressing     Problem: Pain  Goal: Verbalizes/displays adequate comfort level or baseline comfort level  Outcome: Progressing     Problem: Respiratory - Adult  Goal: Achieves optimal ventilation and oxygenation  Outcome: Progressing     Problem: Gastrointestinal - Adult  Goal: Minimal or absence of nausea and vomiting  Outcome: Progressing     Problem: Gastrointestinal - Adult  Goal: Maintains or returns to baseline bowel function  Outcome: Progressing

## 2025-05-08 NOTE — PLAN OF CARE
Problem: Chronic Conditions and Co-morbidities  Goal: Patient's chronic conditions and co-morbidity symptoms are monitored and maintained or improved  Outcome: Progressing     Problem: Discharge Planning  Goal: Discharge to home or other facility with appropriate resources  Outcome: Progressing     Problem: Pain  Goal: Verbalizes/displays adequate comfort level or baseline comfort level  Outcome: Progressing     Problem: Respiratory - Adult  Goal: Achieves optimal ventilation and oxygenation  Outcome: Progressing     Problem: Gastrointestinal - Adult  Goal: Minimal or absence of nausea and vomiting  Outcome: Progressing  Goal: Maintains or returns to baseline bowel function  Outcome: Progressing

## 2025-05-08 NOTE — PROGRESS NOTES
General and Vascular Surgery                                                           Daily Progress Note                                                             Juanpablo Rae PA-C    Pt Name: Piper Walker  Medical Record Number: [id number]  Date of Birth 1960   Today's Date: 5/8/2025    ASSESSMENT/PLAN  Perforated diverticulitis  Feeling better today  Leukocytosis: WBC count 20.8-->11.6  IVF  IV antibiotics  Denies any nausea or emesis  +flatulence. Denies any BM since admission  Continue clear liquid diet. Will slowly advance as symptoms improve.  Denies ever having a colonoscopy in the past. Recommend a colonoscopy as in outpatient in 4-6 weeks.     EDUCATION  Patient educated about their illness/diagnosis, stated above, and all questions answered. We discussed the importance of nutrition, medications they are taking, and healthy lifestyle.  SUBJECTIVE  Piper has improved from yesterday. Pain is well controlled. She has no nausea and no vomiting.  She has passed flatus and has not had a bowel movement. She is tolerating thin liquids. Current activity is ad cathy    OBJECTIVE  VITALS:  height is 1.676 m (5' 6\") and weight is 53.9 kg (118 lb 12.8 oz). Her oral temperature is 97.9 °F (36.6 °C). Her blood pressure is 127/83 and her pulse is 91. Her respiration is 18 and oxygen saturation is 97%. VITALS:  /83   Pulse 91   Temp 97.9 °F (36.6 °C) (Oral)   Resp 18   Ht 1.676 m (5' 6\")   Wt 53.9 kg (118 lb 12.8 oz)   SpO2 97%   BMI 19.17 kg/m²   GENERAL: alert, no distress  LUNGS: clear to ausculation, without wheezes, rales or rhonci  HEART: normal rate and regular rhythm  ABDOMEN: soft, LLQ tenderness, non-distended  EXTREMITY: no cyanosis, clubbing or edema  I/O last 3 completed shifts:  In: 807.4 [P.O.:710; IV Piggyback:97.4]  Out: -   I/O this shift:  In: 1059.2 [I.V.:930.5; IV Piggyback:128.8]  Out: -     LABS  Recent Labs

## 2025-05-08 NOTE — PROGRESS NOTES
V2.0    [facility] [place]      Name:  Piper Walker /Age/Sex: 1960  (65 y.o. female)   MRN & CSN:  [id number] & [id number] Encounter Date/Time: 2025 11:37 AM EDT   Location:  [id number] PCP: Tata Loredo, APRN - CNP     Attending:Evaristo Rivera DO       Hospital Day: 2  Brief HPI  Piper Walker is a 65 y.o. female with a pertinent PMHx of CVA, seizures who presented complaining of 3-day history of worsening abdominal pain, subjective fevers, decreased appetite.  Imaging in the ED was concerning for perforated diverticulitis.  Assessment & Plan     Acute diverticulitis with perforation  - No evidence of acute abdomen on CT abdomen pelvis without pneumoperitoneum  - Blood cultures in process, no growth reported at this time  - Discontinue IV fluids as tolerating clear liquid diet  - Continue IV antibiotics with Zosyn  - If acute worsening of abdominal pain will obtain KUB to evaluate for free air  - Will need GI referral at discharge for follow-up colonoscopy in 4-6 weeks  - General Surgery following, note reviewed today , recommending slowly advancing diet as symptoms improve      Diet ADULT DIET; Clear Liquid   DVT Prophylaxis [x] Lovenox, []  Heparin, [] SCDs, [] Ambulation,  [] Eliquis, [] Xarelto  [] Coumadin   Code Status Full Code   Disposition From: Home  Expected Disposition: Home  Estimated Date of Discharge: 5/10-           Subjective:     Chief Complaint: Abdominal pain    Patient was seen and examined today lying in bed  Says that her pain is doing a little bit better today, but still has some midepigastric to lower abdominal pain  Has not had a bowel movement since she has been here  Tolerating a clear liquid diet without any nausea or emesis  Has not noticed any fevers    Review of Systems:      Pertinent positives and negatives discussed in HPI    Objective:     Intake/Output Summary (Last 24 hours) at 2025 1137  Last data filed at 2025 0914  Gross per 24

## 2025-05-09 PROBLEM — D72.829 LEUKOCYTOSIS: Status: ACTIVE | Noted: 2025-05-09

## 2025-05-09 LAB
ANION GAP SERPL CALCULATED.3IONS-SCNC: 11 MMOL/L (ref 3–16)
BASOPHILS # BLD: 0.1 K/UL (ref 0–0.2)
BASOPHILS NFR BLD: 0.9 %
BUN SERPL-MCNC: 11 MG/DL (ref 7–20)
CALCIUM SERPL-MCNC: 8.3 MG/DL (ref 8.3–10.6)
CHLORIDE SERPL-SCNC: 105 MMOL/L (ref 99–110)
CO2 SERPL-SCNC: 22 MMOL/L (ref 21–32)
CREAT SERPL-MCNC: 0.6 MG/DL (ref 0.6–1.2)
DEPRECATED RDW RBC AUTO: 13.3 % (ref 12.4–15.4)
EOSINOPHIL # BLD: 0.3 K/UL (ref 0–0.6)
EOSINOPHIL NFR BLD: 3.4 %
GFR SERPLBLD CREATININE-BSD FMLA CKD-EPI: >90 ML/MIN/{1.73_M2}
GLUCOSE SERPL-MCNC: 153 MG/DL (ref 70–99)
HCT VFR BLD AUTO: 38.9 % (ref 36–48)
HGB BLD-MCNC: 13.1 G/DL (ref 12–16)
LYMPHOCYTES # BLD: 1.5 K/UL (ref 1–5.1)
LYMPHOCYTES NFR BLD: 18.5 %
MAGNESIUM SERPL-MCNC: 1.79 MG/DL (ref 1.8–2.4)
MCH RBC QN AUTO: 31.5 PG (ref 26–34)
MCHC RBC AUTO-ENTMCNC: 33.6 G/DL (ref 31–36)
MCV RBC AUTO: 94 FL (ref 80–100)
MONOCYTES # BLD: 0.9 K/UL (ref 0–1.3)
MONOCYTES NFR BLD: 11.4 %
NEUTROPHILS # BLD: 5.3 K/UL (ref 1.7–7.7)
NEUTROPHILS NFR BLD: 65.8 %
PLATELET # BLD AUTO: 418 K/UL (ref 135–450)
PMV BLD AUTO: 6.8 FL (ref 5–10.5)
POTASSIUM SERPL-SCNC: 3.4 MMOL/L (ref 3.5–5.1)
RBC # BLD AUTO: 4.14 M/UL (ref 4–5.2)
SODIUM SERPL-SCNC: 138 MMOL/L (ref 136–145)
WBC # BLD AUTO: 8.1 K/UL (ref 4–11)

## 2025-05-09 PROCEDURE — 94760 N-INVAS EAR/PLS OXIMETRY 1: CPT

## 2025-05-09 PROCEDURE — APPSS15 APP SPLIT SHARED TIME 0-15 MINUTES: Performed by: PHYSICIAN ASSISTANT

## 2025-05-09 PROCEDURE — 2500000003 HC RX 250 WO HCPCS: Performed by: HOSPITALIST

## 2025-05-09 PROCEDURE — 1200000000 HC SEMI PRIVATE

## 2025-05-09 PROCEDURE — APPNB15 APP NON BILLABLE TIME 0-15 MINS: Performed by: PHYSICIAN ASSISTANT

## 2025-05-09 PROCEDURE — 6370000000 HC RX 637 (ALT 250 FOR IP): Performed by: SURGERY

## 2025-05-09 PROCEDURE — 6360000002 HC RX W HCPCS: Performed by: STUDENT IN AN ORGANIZED HEALTH CARE EDUCATION/TRAINING PROGRAM

## 2025-05-09 PROCEDURE — 6370000000 HC RX 637 (ALT 250 FOR IP): Performed by: STUDENT IN AN ORGANIZED HEALTH CARE EDUCATION/TRAINING PROGRAM

## 2025-05-09 PROCEDURE — 99232 SBSQ HOSP IP/OBS MODERATE 35: CPT | Performed by: SURGERY

## 2025-05-09 PROCEDURE — 85025 COMPLETE CBC W/AUTO DIFF WBC: CPT

## 2025-05-09 PROCEDURE — 6360000002 HC RX W HCPCS: Performed by: HOSPITALIST

## 2025-05-09 PROCEDURE — 80048 BASIC METABOLIC PNL TOTAL CA: CPT

## 2025-05-09 PROCEDURE — 36415 COLL VENOUS BLD VENIPUNCTURE: CPT

## 2025-05-09 PROCEDURE — 83735 ASSAY OF MAGNESIUM: CPT

## 2025-05-09 PROCEDURE — 2580000003 HC RX 258: Performed by: HOSPITALIST

## 2025-05-09 RX ORDER — HYDROCODONE BITARTRATE AND ACETAMINOPHEN 5; 325 MG/1; MG/1
1 TABLET ORAL EVERY 6 HOURS PRN
Status: DISCONTINUED | OUTPATIENT
Start: 2025-05-09 | End: 2025-05-10 | Stop reason: HOSPADM

## 2025-05-09 RX ORDER — OXYCODONE HYDROCHLORIDE 5 MG/1
5 TABLET ORAL EVERY 4 HOURS PRN
Refills: 0 | Status: DISCONTINUED | OUTPATIENT
Start: 2025-05-09 | End: 2025-05-09

## 2025-05-09 RX ORDER — ACETAMINOPHEN 325 MG/1
650 TABLET ORAL EVERY 6 HOURS PRN
Status: DISCONTINUED | OUTPATIENT
Start: 2025-05-09 | End: 2025-05-10 | Stop reason: HOSPADM

## 2025-05-09 RX ADMIN — DOCUSATE SODIUM 100 MG: 100 CAPSULE, LIQUID FILLED ORAL at 20:00

## 2025-05-09 RX ADMIN — SODIUM CHLORIDE, PRESERVATIVE FREE 10 ML: 5 INJECTION INTRAVENOUS at 20:02

## 2025-05-09 RX ADMIN — DOCUSATE SODIUM 100 MG: 100 CAPSULE, LIQUID FILLED ORAL at 08:29

## 2025-05-09 RX ADMIN — PIPERACILLIN AND TAZOBACTAM 3375 MG: 3; .375 INJECTION, POWDER, LYOPHILIZED, FOR SOLUTION INTRAVENOUS at 05:05

## 2025-05-09 RX ADMIN — PIPERACILLIN AND TAZOBACTAM 3375 MG: 3; .375 INJECTION, POWDER, LYOPHILIZED, FOR SOLUTION INTRAVENOUS at 11:43

## 2025-05-09 RX ADMIN — ENOXAPARIN SODIUM 40 MG: 100 INJECTION SUBCUTANEOUS at 08:29

## 2025-05-09 RX ADMIN — HYDROCODONE BITARTRATE AND ACETAMINOPHEN 1 TABLET: 5; 325 TABLET ORAL at 19:59

## 2025-05-09 RX ADMIN — HYDROMORPHONE HYDROCHLORIDE 0.5 MG: 1 INJECTION, SOLUTION INTRAMUSCULAR; INTRAVENOUS; SUBCUTANEOUS at 06:07

## 2025-05-09 RX ADMIN — PIPERACILLIN AND TAZOBACTAM 3375 MG: 3; .375 INJECTION, POWDER, LYOPHILIZED, FOR SOLUTION INTRAVENOUS at 20:03

## 2025-05-09 ASSESSMENT — PAIN DESCRIPTION - LOCATION
LOCATION: ABDOMEN
LOCATION: ABDOMEN

## 2025-05-09 ASSESSMENT — PAIN SCALES - GENERAL
PAINLEVEL_OUTOF10: 7
PAINLEVEL_OUTOF10: 0
PAINLEVEL_OUTOF10: 6
PAINLEVEL_OUTOF10: 7

## 2025-05-09 ASSESSMENT — PAIN DESCRIPTION - DESCRIPTORS
DESCRIPTORS: ACHING;CRAMPING;DISCOMFORT
DESCRIPTORS: ACHING;CRAMPING

## 2025-05-09 ASSESSMENT — PAIN DESCRIPTION - ORIENTATION: ORIENTATION: INNER

## 2025-05-09 NOTE — PLAN OF CARE
Problem: Chronic Conditions and Co-morbidities  Goal: Patient's chronic conditions and co-morbidity symptoms are monitored and maintained or improved  5/9/2025 0859 by Heladio Spencer RN  Outcome: Progressing     Problem: Discharge Planning  Goal: Discharge to home or other facility with appropriate resources  5/9/2025 0859 by Heladio Spencer RN  Outcome: Progressing     Problem: Pain  Goal: Verbalizes/displays adequate comfort level or baseline comfort level  5/9/2025 0859 by Heladio Spencer RN  Outcome: Progressing     Problem: Respiratory - Adult  Goal: Achieves optimal ventilation and oxygenation  5/9/2025 0859 by Heladio Spencer RN  Outcome: Progressing     Problem: Gastrointestinal - Adult  Goal: Minimal or absence of nausea and vomiting  5/9/2025 0859 by Heladio Spencer RN  Outcome: Progressing     Problem: Gastrointestinal - Adult  Goal: Maintains or returns to baseline bowel function  5/9/2025 0859 by Heladio Spencer RN  Outcome: Progressing     Problem: Safety - Adult  Goal: Free from fall injury  5/9/2025 0859 by Heladio Spencer RN  Outcome: Progressing

## 2025-05-09 NOTE — PROGRESS NOTES
[place]2.0    [facility] [place]      Name:  Piper Walker /Age/Sex: 1960  (65 y.o. female)   MRN & CSN:  [id number] & [id number] Encounter Date/Time: 2025 11:37 AM EDT   Location:  [id number] PCP: Tata Loredo, APRN - CNP     Attending:Evaristo Rivera DO       Hospital Day: 3  Brief HPI  Piper Walker is a 65 y.o. female with a pertinent PMHx of CVA, seizures who presented complaining of 3-day history of worsening abdominal pain, subjective fevers, decreased appetite.  Imaging in the ED was concerning for perforated diverticulitis.  Assessment & Plan     Acute diverticulitis with perforation  - Blood cultures remain with no growth to date  - Continue IV antibiotics with Zosyn, as pain improves can likely transition to oral Augmentin soon  - Pain improving, tolerating full liquid diet, possible trial advancement of diet later today  - Will need GI referral at discharge for follow-up colonoscopy in 4-6 weeks  - General Surgery following      Diet ADULT DIET; Regular   DVT Prophylaxis [x] Lovenox, []  Heparin, [] SCDs, [] Ambulation,  [] Eliquis, [] Xarelto  [] Coumadin   Code Status Full Code   Disposition From: Home  Expected Disposition: Home  Estimated Date of Discharge: 5/10-           Subjective:     Chief Complaint: Abdominal pain    Patient seen and examined today lying in bed  Says that her pain is doing a little bit better, going longer spurts between flareups of pain  Tolerated clear liquid diet yesterday, has not tried her full liquid yet, is going to try some cream of wheat for breakfast  No bowel movement yet  No fevers  Review of Systems:      Pertinent positives and negatives discussed in HPI    Objective:     Intake/Output Summary (Last 24 hours) at 2025 1142  Last data filed at 2025 1042  Gross per 24 hour   Intake 880 ml   Output --   Net 880 ml      Vitals:   Vitals:    25 0540 25 0636 25 0805 25 0807   BP:  122/68  126/79   Pulse:  75 75 76    Resp:   15 16   Temp:  97.9 °F (36.6 °C)  97.9 °F (36.6 °C)   TempSrc:  Oral  Oral   SpO2:  97% 98% 96%   Weight: 52.4 kg (115 lb 9.6 oz)      Height:             Physical Exam:    Physical Exam  Constitutional:       General: She is not in acute distress.  Cardiovascular:      Rate and Rhythm: Normal rate and regular rhythm.      Heart sounds: No murmur heard.  Pulmonary:      Effort: Pulmonary effort is normal.      Breath sounds: No wheezing, rhonchi or rales.   Abdominal:      General: There is no distension.      Palpations: Abdomen is soft.      Tenderness: There is abdominal tenderness (Left lower quadrant). There is no guarding.   Musculoskeletal:      Right lower leg: No edema.      Left lower leg: No edema.   Neurological:      Mental Status: She is alert.          Medications:   Medications:    enoxaparin  40 mg SubCUTAneous Daily    docusate sodium  100 mg Oral BID    sodium chloride flush  5-40 mL IntraVENous 2 times per day    piperacillin-tazobactam  3,375 mg IntraVENous Q8H      Infusions:    sodium chloride       PRN Meds: HYDROcodone 5 mg - acetaminophen, 1 tablet, Q6H PRN  acetaminophen, 650 mg, Q6H PRN  sodium chloride flush, 5-40 mL, PRN  sodium chloride, , PRN        Labs and Imaging   CT ABDOMEN PELVIS WO CONTRAST Additional Contrast? None  Result Date: 5/7/2025  EXAMINATION: CT OF THE ABDOMEN AND PELVIS WITHOUT CONTRAST 5/7/2025 4:00 am TECHNIQUE: CT of the abdomen and pelvis was performed without the administration of intravenous contrast. Multiplanar reformatted images are provided for review. Automated exposure control, iterative reconstruction, and/or weight based adjustment of the mA/kV was utilized to reduce the radiation dose to as low as reasonably achievable. COMPARISON: 06/10/2023 CT abdomen/pelvis HISTORY: ORDERING SYSTEM PROVIDED HISTORY: abd pain TECHNOLOGIST PROVIDED HISTORY: Additional Contrast?->None Reason for exam:->abd pain Decision Support Exception - unselect if not a

## 2025-05-09 NOTE — CARE COORDINATION
Chart review done, rounds completed. Likely dc over the weekend, here for diverticulitis, tolerating diet.     Ronald Castro LMSW, HP  [facility] Social Work Case Management   Phone: [phone]  Fax: [phone]

## 2025-05-09 NOTE — PLAN OF CARE
Problem: Chronic Conditions and Co-morbidities  Goal: Patient's chronic conditions and co-morbidity symptoms are monitored and maintained or improved  5/8/2025 2233 by Maryam Toledo RN  Outcome: Progressing  5/8/2025 1619 by Esperanza Diop RN  Outcome: Progressing     Problem: Discharge Planning  Goal: Discharge to home or other facility with appropriate resources  5/8/2025 2233 by Maryam Toledo RN  Outcome: Progressing  5/8/2025 1619 by Esperanza Diop RN  Outcome: Progressing     Problem: Pain  Goal: Verbalizes/displays adequate comfort level or baseline comfort level  5/8/2025 2233 by Maryam Toledo RN  Outcome: Progressing  5/8/2025 1619 by Esperanza Diop RN  Outcome: Progressing     Problem: Respiratory - Adult  Goal: Achieves optimal ventilation and oxygenation  5/8/2025 2233 by Maryam Toledo RN  Outcome: Progressing  5/8/2025 1619 by Esperanza Diop RN  Outcome: Progressing     Problem: Gastrointestinal - Adult  Goal: Minimal or absence of nausea and vomiting  5/8/2025 2233 by Maryam Toledo RN  Outcome: Progressing  5/8/2025 1619 by Esperanza Diop RN  Outcome: Progressing  Goal: Maintains or returns to baseline bowel function  5/8/2025 2233 by Maryam Toledo RN  Outcome: Progressing  5/8/2025 1619 by Esperanza Diop RN  Outcome: Progressing     Problem: Safety - Adult  Goal: Free from fall injury  Outcome: Progressing

## 2025-05-09 NOTE — PROGRESS NOTES
General and Vascular Surgery                                                           Daily Progress Note                                                             Juanpablo Rae PA-C    Pt Name: Piper Walker  Medical Record Number: [id number]  Date of Birth 1960   Today's Date: 5/9/2025    ASSESSMENT/PLAN  Perforated diverticulitis  Feeling better today  Leukocytosis: WBC count 20.8-->11.6-->8.1  IVF  IV antibiotics  Denies any nausea or emesis  +flatulence. Denies any BM since admission  Advance diet to regular as tolerated.   Denies ever having a colonoscopy in the past. Recommend a colonoscopy as in outpatient in 4-6 weeks.     EDUCATION  Patient educated about their illness/diagnosis, stated above, and all questions answered. We discussed the importance of nutrition, medications they are taking, and healthy lifestyle.  SUBJECTIVE  Piper has improved from yesterday. Pain is well controlled. She has no nausea and no vomiting.  She has passed flatus and has not had a bowel movement. She is tolerating thin liquids. Current activity is ad cathy    OBJECTIVE  VITALS:  height is 1.676 m (5' 6\") and weight is 52.4 kg (115 lb 9.6 oz). Her oral temperature is 97.7 °F (36.5 °C). Her blood pressure is 112/72 and her pulse is 67. Her respiration is 17 and oxygen saturation is 100%. VITALS:  /72   Pulse 67   Temp 97.7 °F (36.5 °C) (Oral)   Resp 17   Ht 1.676 m (5' 6\")   Wt 52.4 kg (115 lb 9.6 oz)   SpO2 100%   BMI 18.66 kg/m²   GENERAL: alert, no distress  LUNGS: clear to ausculation, without wheezes, rales or rhonci  HEART: normal rate and regular rhythm  ABDOMEN: soft, LLQ tenderness, non-distended  EXTREMITY: no cyanosis, clubbing or edema  I/O last 3 completed shifts:  In: 1969.2 [P.O.:910; I.V.:930.5; IV Piggyback:128.8]  Out: -   I/O this shift:  In: 440 [P.O.:440]  Out: -     LABS  Recent Labs     05/07/25  0345 05/08/25  0521

## 2025-05-09 NOTE — PROGRESS NOTES
Patient had a small and formed BM - tolerated 50% of regular diet for lunch, patient states no notable increase in pain/nausea/ no vomiting noted.    Electronically signed by Heladio Spencer RN on 5/9/2025 at 3:12 PM

## 2025-05-10 VITALS
TEMPERATURE: 98.1 F | WEIGHT: 115.74 LBS | SYSTOLIC BLOOD PRESSURE: 116 MMHG | OXYGEN SATURATION: 96 % | HEIGHT: 66 IN | HEART RATE: 70 BPM | RESPIRATION RATE: 16 BRPM | BODY MASS INDEX: 18.6 KG/M2 | DIASTOLIC BLOOD PRESSURE: 77 MMHG

## 2025-05-10 LAB
BASOPHILS # BLD: 0 K/UL (ref 0–0.2)
BASOPHILS NFR BLD: 0.6 %
DEPRECATED RDW RBC AUTO: 13.4 % (ref 12.4–15.4)
EOSINOPHIL # BLD: 0.3 K/UL (ref 0–0.6)
EOSINOPHIL NFR BLD: 4.1 %
HCT VFR BLD AUTO: 37.5 % (ref 36–48)
HGB BLD-MCNC: 12.9 G/DL (ref 12–16)
LYMPHOCYTES # BLD: 1.7 K/UL (ref 1–5.1)
LYMPHOCYTES NFR BLD: 20.8 %
MCH RBC QN AUTO: 31.8 PG (ref 26–34)
MCHC RBC AUTO-ENTMCNC: 34.4 G/DL (ref 31–36)
MCV RBC AUTO: 92.5 FL (ref 80–100)
MONOCYTES # BLD: 1 K/UL (ref 0–1.3)
MONOCYTES NFR BLD: 12.9 %
NEUTROPHILS # BLD: 5 K/UL (ref 1.7–7.7)
NEUTROPHILS NFR BLD: 61.6 %
PLATELET # BLD AUTO: 442 K/UL (ref 135–450)
PMV BLD AUTO: 7 FL (ref 5–10.5)
RBC # BLD AUTO: 4.06 M/UL (ref 4–5.2)
WBC # BLD AUTO: 8.1 K/UL (ref 4–11)

## 2025-05-10 PROCEDURE — 6360000002 HC RX W HCPCS: Performed by: STUDENT IN AN ORGANIZED HEALTH CARE EDUCATION/TRAINING PROGRAM

## 2025-05-10 PROCEDURE — 6360000002 HC RX W HCPCS: Performed by: HOSPITALIST

## 2025-05-10 PROCEDURE — 36415 COLL VENOUS BLD VENIPUNCTURE: CPT

## 2025-05-10 PROCEDURE — 85025 COMPLETE CBC W/AUTO DIFF WBC: CPT

## 2025-05-10 PROCEDURE — 2580000003 HC RX 258: Performed by: HOSPITALIST

## 2025-05-10 PROCEDURE — 6370000000 HC RX 637 (ALT 250 FOR IP): Performed by: STUDENT IN AN ORGANIZED HEALTH CARE EDUCATION/TRAINING PROGRAM

## 2025-05-10 PROCEDURE — 99231 SBSQ HOSP IP/OBS SF/LOW 25: CPT | Performed by: SURGERY

## 2025-05-10 PROCEDURE — 94760 N-INVAS EAR/PLS OXIMETRY 1: CPT

## 2025-05-10 PROCEDURE — 2500000003 HC RX 250 WO HCPCS: Performed by: HOSPITALIST

## 2025-05-10 PROCEDURE — 6370000000 HC RX 637 (ALT 250 FOR IP): Performed by: SURGERY

## 2025-05-10 RX ORDER — POLYETHYLENE GLYCOL 3350 17 G/17G
17 POWDER, FOR SOLUTION ORAL 2 TIMES DAILY
Status: DISCONTINUED | OUTPATIENT
Start: 2025-05-10 | End: 2025-05-10

## 2025-05-10 RX ORDER — HYDROCODONE BITARTRATE AND ACETAMINOPHEN 5; 325 MG/1; MG/1
1 TABLET ORAL EVERY 8 HOURS PRN
Qty: 6 TABLET | Refills: 0 | Status: SHIPPED | OUTPATIENT
Start: 2025-05-10 | End: 2025-05-12

## 2025-05-10 RX ORDER — POLYETHYLENE GLYCOL 3350 17 G/17G
17 POWDER, FOR SOLUTION ORAL DAILY
Status: DISCONTINUED | OUTPATIENT
Start: 2025-05-10 | End: 2025-05-10 | Stop reason: HOSPADM

## 2025-05-10 RX ORDER — POLYETHYLENE GLYCOL 3350 17 G/17G
17 POWDER, FOR SOLUTION ORAL DAILY
Qty: 510 G | Refills: 0 | Status: SHIPPED | OUTPATIENT
Start: 2025-05-10 | End: 2025-06-09

## 2025-05-10 RX ORDER — DOCUSATE SODIUM 100 MG/1
100 CAPSULE, LIQUID FILLED ORAL 2 TIMES DAILY
Qty: 60 CAPSULE | Refills: 0 | Status: SHIPPED | OUTPATIENT
Start: 2025-05-10

## 2025-05-10 RX ORDER — DOCUSATE SODIUM 100 MG/1
100 CAPSULE, LIQUID FILLED ORAL 2 TIMES DAILY
Qty: 60 CAPSULE | Refills: 0 | Status: SHIPPED | OUTPATIENT
Start: 2025-05-10 | End: 2025-05-10

## 2025-05-10 RX ORDER — HYDROCODONE BITARTRATE AND ACETAMINOPHEN 5; 325 MG/1; MG/1
1 TABLET ORAL EVERY 8 HOURS PRN
Qty: 6 TABLET | Refills: 0 | Status: SHIPPED | OUTPATIENT
Start: 2025-05-10 | End: 2025-05-10

## 2025-05-10 RX ORDER — POLYETHYLENE GLYCOL 3350 17 G/17G
17 POWDER, FOR SOLUTION ORAL DAILY
Qty: 1530 G | Refills: 1 | Status: SHIPPED | OUTPATIENT
Start: 2025-05-10 | End: 2025-05-10

## 2025-05-10 RX ADMIN — PIPERACILLIN AND TAZOBACTAM 3375 MG: 3; .375 INJECTION, POWDER, LYOPHILIZED, FOR SOLUTION INTRAVENOUS at 04:42

## 2025-05-10 RX ADMIN — DOCUSATE SODIUM 100 MG: 100 CAPSULE, LIQUID FILLED ORAL at 08:30

## 2025-05-10 RX ADMIN — ENOXAPARIN SODIUM 40 MG: 100 INJECTION SUBCUTANEOUS at 08:31

## 2025-05-10 RX ADMIN — HYDROCODONE BITARTRATE AND ACETAMINOPHEN 1 TABLET: 5; 325 TABLET ORAL at 01:49

## 2025-05-10 RX ADMIN — SODIUM CHLORIDE, PRESERVATIVE FREE 10 ML: 5 INJECTION INTRAVENOUS at 04:42

## 2025-05-10 RX ADMIN — POLYETHYLENE GLYCOL 3350 17 G: 17 POWDER, FOR SOLUTION ORAL at 08:30

## 2025-05-10 RX ADMIN — HYDROCODONE BITARTRATE AND ACETAMINOPHEN 1 TABLET: 5; 325 TABLET ORAL at 08:37

## 2025-05-10 ASSESSMENT — PAIN DESCRIPTION - LOCATION
LOCATION: ABDOMEN
LOCATION: ABDOMEN

## 2025-05-10 ASSESSMENT — PAIN SCALES - GENERAL
PAINLEVEL_OUTOF10: 6
PAINLEVEL_OUTOF10: 4
PAINLEVEL_OUTOF10: 8
PAINLEVEL_OUTOF10: 8

## 2025-05-10 ASSESSMENT — PAIN DESCRIPTION - ORIENTATION
ORIENTATION: INNER
ORIENTATION: LOWER;INNER

## 2025-05-10 ASSESSMENT — PAIN DESCRIPTION - DESCRIPTORS
DESCRIPTORS: ACHING;GNAWING;DISCOMFORT
DESCRIPTORS: ACHING

## 2025-05-10 ASSESSMENT — PAIN - FUNCTIONAL ASSESSMENT: PAIN_FUNCTIONAL_ASSESSMENT: ACTIVITIES ARE NOT PREVENTED

## 2025-05-10 NOTE — DISCHARGE SUMMARY
V2.0  Discharge Summary    Name:  Piper Walker /Age/Sex: 1960 (65 y.o. female)   Admit Date: 2025  Discharge Date: 5/10/25    MRN & CSN:  [id number] & [id number] Encounter Date and Time 5/10/25 9:34 AM EDT    Attending:  Evaristo Rivera DO Discharging Provider: Evaristo Rivera DO       Hospital Course:     Brief HPI: Piper Walker is a 65 y.o. female with a pertinent PMHx of CVA, seizures who presented complaining of 3-day history of worsening abdominal pain, subjective fevers, decreased appetite.  Imaging in the ED was concerning for perforated diverticulitis    Brief Problem Based Course:     Acute diverticulitis with perforation  - Blood cultures were with no growth  - Was on IV antibiotics with Zosyn which has been transitioned to oral Augmentin for additional 10-day course  - Pain progressively improved and diet was slowly advanced currently tolerating regular diet  - General Surgery was consulted on admission and recommended nonoperative management, patient had progressive improvement, outpatient follow-up in 2 weeks  - Gastroenterology referral for colonoscopy in 4 to 6 weeks      The patient expressed appropriate understanding of, and agreement with the discharge recommendations, medications, and plan.     Consults this admission:  IP CONSULT TO GENERAL SURGERY    Discharge Diagnosis:   Acute diverticulitis    Discharge Instruction:   Follow up appointments: PCP, general surgery  Primary care physician: Tata Loredo APRN - CNP within 1 week  Diet: regular diet   Activity: activity as tolerated  Disposition: Discharged to:   [x]Home, []C, []SNF, []Acute Rehab, []Hospice   Condition on discharge: Stable  Labs and Tests to be Followed up as an outpatient by PCP or Specialist:     Discharge Medications:        Medication List        START taking these medications      amoxicillin-clavulanate 875-125 MG per tablet  Commonly known as: AUGMENTIN  Take 1 tablet by mouth 2 times daily for 10

## 2025-05-10 NOTE — CARE COORDINATION
Case Management Discharge Note          Date / Time of Note: 5/10/2025 12:14 PM                  Patient Name: Piper Walker   YOB: 1960  Diagnosis: Diverticulitis [K57.92]  Sepsis without acute organ dysfunction, due to unspecified organism (HCC) [A41.9]   Date / Time: 5/7/2025  3:34 AM    Financial:  Payor: /      Pharmacy:    [facility] [id number] - [place], [place] - [address]  RONDA [phone] -  [phone]  [address] [place] [address]  Phone: [phone] Fax: [phone]      Assistance purchasing medications?: Potential Assistance Purchasing Medications: No  Assistance provided by Case Management: Community Prescription Assistance Programs    DISCHARGE Disposition: Home- No Services Needed            Transportation:  Transportation PLAN for discharge: family   Mode of Transport: Private Car    Time of Transport: afternoon        IMM Completed:   Not Indicated    Additional CM Notes: Patient provided information to get matthew RX from [facility].    The Plan for Transition of Care is related to the following treatment goals of Diverticulitis [K57.92]  Sepsis without acute organ dysfunction, due to unspecified organism (HCC) [A41.9]    The Patient and/or patient representative Piper and her family were provided with a choice of provider and agrees with the discharge plan Yes    Freedom of choice list was provided with basic dialogue that supports the patient's individualized plan of care/goals and shares the quality data associated with the providers. Yes    CROW Calderón  [facility]   Case Management Department  Ph: [phone]  Fax: [phone]

## 2025-05-10 NOTE — PROGRESS NOTES
General and Vascular Surgery                                                           Daily Progress Note                                                             Ashutosh Nunez MD    Pt Name: Piper Walker  Medical Record Number: [id number]  Date of Birth 1960   Today's Date: 5/10/2025    ASSESSMENT/PLAN  Perforated diverticulitis - Leukocytosis resolved.  WBC 8.1 today.  Tolerating regular diet.  + BM.  Continue bowel regimen.  OK for discharge from surgical standpoint.  Will change antibiotics to augmentin x 10 days.  Will send in bowel regimen.  Follow up with me in 2 weeks, call for appointment.  Will set patient up with GI as an outpatient for colonoscopy in 6-8 weeks    Tobacco abuse - smoking cessation        SUBJECTIVE  Piper has improved from yesterday. Pain is well controlled. She has no nausea and no vomiting.  She has passed flatus and has had a bowel movement. She is tolerating a regular diet. Current activity is ad cathy    OBJECTIVE  VITALS:  height is 1.676 m (5' 6\") and weight is 52.5 kg (115 lb 11.9 oz). Her oral temperature is 98.1 °F (36.7 °C). Her blood pressure is 116/77 and her pulse is 70. Her respiration is 14 and oxygen saturation is 96%. VITALS:  /77   Pulse 70   Temp 98.1 °F (36.7 °C) (Oral)   Resp 14   Ht 1.676 m (5' 6\")   Wt 52.5 kg (115 lb 11.9 oz)   SpO2 96%   BMI 18.68 kg/m²   GENERAL: alert, no distress  LUNGS: normal respiratory effort, no accessory muscle use  ABDOMEN: soft, NT, non-distended  EXTREMITY: no cyanosis, clubbing  I/O last 3 completed shifts:  In: 1953.1 [P.O.:1660; I.V.:15; IV Piggyback:278.1]  Out: -   No intake/output data recorded.    LABS  Recent Labs     05/09/25  0849 05/10/25  0450   WBC 8.1 8.1   HGB 13.1 12.9   HCT 38.9 37.5    442     --    K 3.4*  --      --    CO2 22  --    BUN 11  --    CREATININE 0.6  --    MG 1.79*  --    CALCIUM 8.3  --    CBC:

## 2025-05-10 NOTE — PLAN OF CARE
Problem: Chronic Conditions and Co-morbidities  Goal: Patient's chronic conditions and co-morbidity symptoms are monitored and maintained or improved  5/10/2025 0954 by Addy Hooper RN  Outcome: Completed  5/10/2025 0902 by Addy Hooper RN  Outcome: Progressing  5/9/2025 2107 by Hyun Busch RN  Outcome: Progressing  Flowsheets (Taken 5/9/2025 2046)  Care Plan - Patient's Chronic Conditions and Co-Morbidity Symptoms are Monitored and Maintained or Improved: Monitor and assess patient's chronic conditions and comorbid symptoms for stability, deterioration, or improvement     Problem: Discharge Planning  Goal: Discharge to home or other facility with appropriate resources  5/10/2025 0954 by Addy Hooper RN  Outcome: Completed  5/10/2025 0902 by Addy Hooper RN  Outcome: Progressing  5/9/2025 2107 by Hyun Busch RN  Outcome: Progressing  Flowsheets (Taken 5/9/2025 2046)  Discharge to home or other facility with appropriate resources: Identify barriers to discharge with patient and caregiver     Problem: Pain  Goal: Verbalizes/displays adequate comfort level or baseline comfort level  5/10/2025 0954 by Addy Hooper RN  Outcome: Completed  5/10/2025 0902 by Addy Hooper RN  Outcome: Progressing  5/9/2025 2107 by Hyun Busch RN  Outcome: Progressing     Problem: Respiratory - Adult  Goal: Achieves optimal ventilation and oxygenation  5/10/2025 0954 by Addy Hooper RN  Outcome: Completed  5/10/2025 0902 by Addy Hooper RN  Outcome: Progressing  5/9/2025 2107 by Hyun Busch RN  Outcome: Progressing  Flowsheets (Taken 5/9/2025 2046)  Achieves optimal ventilation and oxygenation: Assess for changes in respiratory status     Problem: Gastrointestinal - Adult  Goal: Minimal or absence of nausea and vomiting  5/10/2025 0954 by Addy Hooper RN  Outcome: Completed  5/10/2025 0902 by Addy Hooper RN  Outcome: Progressing  5/9/2025 2107 by Hyun Busch RN  Outcome:

## 2025-05-10 NOTE — PLAN OF CARE
Problem: Chronic Conditions and Co-morbidities  Goal: Patient's chronic conditions and co-morbidity symptoms are monitored and maintained or improved  5/10/2025 0902 by Addy Hooper RN  Outcome: Progressing  5/9/2025 2107 by Hyun Busch RN  Outcome: Progressing  Flowsheets (Taken 5/9/2025 2046)  Care Plan - Patient's Chronic Conditions and Co-Morbidity Symptoms are Monitored and Maintained or Improved: Monitor and assess patient's chronic conditions and comorbid symptoms for stability, deterioration, or improvement     Problem: Discharge Planning  Goal: Discharge to home or other facility with appropriate resources  5/10/2025 0902 by Addy Hooper RN  Outcome: Progressing  5/9/2025 2107 by Hyun Busch RN  Outcome: Progressing  Flowsheets (Taken 5/9/2025 2046)  Discharge to home or other facility with appropriate resources: Identify barriers to discharge with patient and caregiver     Problem: Pain  Goal: Verbalizes/displays adequate comfort level or baseline comfort level  5/10/2025 0902 by Addy Hooper RN  Outcome: Progressing  5/9/2025 2107 by Hyun Busch RN  Outcome: Progressing     Problem: Respiratory - Adult  Goal: Achieves optimal ventilation and oxygenation  5/10/2025 0902 by Addy Hooper RN  Outcome: Progressing  5/9/2025 2107 by Hyun Busch RN  Outcome: Progressing  Flowsheets (Taken 5/9/2025 2046)  Achieves optimal ventilation and oxygenation: Assess for changes in respiratory status     Problem: Gastrointestinal - Adult  Goal: Minimal or absence of nausea and vomiting  5/10/2025 0902 by Addy Hooper RN  Outcome: Progressing  5/9/2025 2107 by Hyun Busch RN  Outcome: Progressing  Flowsheets (Taken 5/9/2025 2046)  Minimal or absence of nausea and vomiting:   Provide nonpharmacologic comfort measures as appropriate   Administer ordered antiemetic medications as needed  Goal: Maintains or returns to baseline bowel function  5/10/2025 0902 by Addy Hooper

## 2025-05-10 NOTE — PROGRESS NOTES
Patient discharged, PIV removed, home medication instructions written and verbal given to patient's daughter. Electronically signed by Addy Hooper RN on 5/10/2025 at 1:26 PM

## 2025-05-10 NOTE — PLAN OF CARE
Problem: Chronic Conditions and Co-morbidities  Goal: Patient's chronic conditions and co-morbidity symptoms are monitored and maintained or improved  5/9/2025 2107 by Hyun Busch RN  Outcome: Progressing  Flowsheets (Taken 5/9/2025 2046)  Care Plan - Patient's Chronic Conditions and Co-Morbidity Symptoms are Monitored and Maintained or Improved: Monitor and assess patient's chronic conditions and comorbid symptoms for stability, deterioration, or improvement  5/9/2025 0859 by Heladio Spencer RN  Outcome: Progressing     Problem: Discharge Planning  Goal: Discharge to home or other facility with appropriate resources  5/9/2025 2107 by Hyun Busch RN  Outcome: Progressing  Flowsheets (Taken 5/9/2025 2046)  Discharge to home or other facility with appropriate resources: Identify barriers to discharge with patient and caregiver  5/9/2025 0859 by Heladio Spencer RN  Outcome: Progressing     Problem: Pain  Goal: Verbalizes/displays adequate comfort level or baseline comfort level  5/9/2025 2107 by Hyun Busch RN  Outcome: Progressing  5/9/2025 0859 by Heladio Spencer RN  Outcome: Progressing     Problem: Respiratory - Adult  Goal: Achieves optimal ventilation and oxygenation  5/9/2025 2107 by Hyun Busch RN  Outcome: Progressing  Flowsheets (Taken 5/9/2025 2046)  Achieves optimal ventilation and oxygenation: Assess for changes in respiratory status  5/9/2025 0859 by Heladio Spencer RN  Outcome: Progressing     Problem: Gastrointestinal - Adult  Goal: Minimal or absence of nausea and vomiting  5/9/2025 2107 by Hyun Busch RN  Outcome: Progressing  Flowsheets (Taken 5/9/2025 2046)  Minimal or absence of nausea and vomiting:   Provide nonpharmacologic comfort measures as appropriate   Administer ordered antiemetic medications as needed  5/9/2025 0859 by Heladio Spencer RN  Outcome: Progressing  Goal: Maintains or returns to baseline bowel function  5/9/2025 2107 by Jo-Ann

## 2025-05-11 LAB
BACTERIA BLD CULT ORG #2: NORMAL
BACTERIA BLD CULT: NORMAL

## 2025-05-22 ENCOUNTER — OFFICE VISIT (OUTPATIENT)
Dept: SURGERY | Age: 65
End: 2025-05-22

## 2025-05-22 VITALS — SYSTOLIC BLOOD PRESSURE: 118 MMHG | DIASTOLIC BLOOD PRESSURE: 84 MMHG | HEART RATE: 102 BPM

## 2025-05-22 DIAGNOSIS — K57.32 SIGMOID DIVERTICULITIS: ICD-10-CM

## 2025-05-22 DIAGNOSIS — K57.32 SIGMOID DIVERTICULITIS: Primary | ICD-10-CM

## 2025-05-22 LAB
ANION GAP SERPL CALCULATED.3IONS-SCNC: 11 MMOL/L (ref 3–16)
BASOPHILS # BLD: 0.1 K/UL (ref 0–0.2)
BASOPHILS NFR BLD: 0.9 %
BUN SERPL-MCNC: 13 MG/DL (ref 7–20)
CALCIUM SERPL-MCNC: 9.2 MG/DL (ref 8.3–10.6)
CHLORIDE SERPL-SCNC: 102 MMOL/L (ref 99–110)
CO2 SERPL-SCNC: 25 MMOL/L (ref 21–32)
CREAT SERPL-MCNC: 0.8 MG/DL (ref 0.6–1.2)
DEPRECATED RDW RBC AUTO: 13.6 % (ref 12.4–15.4)
EOSINOPHIL # BLD: 0.3 K/UL (ref 0–0.6)
EOSINOPHIL NFR BLD: 2 %
GFR SERPLBLD CREATININE-BSD FMLA CKD-EPI: 83 ML/MIN/{1.73_M2}
GLUCOSE SERPL-MCNC: 94 MG/DL (ref 70–99)
HCT VFR BLD AUTO: 39.2 % (ref 36–48)
HGB BLD-MCNC: 13.3 G/DL (ref 12–16)
LYMPHOCYTES # BLD: 2.7 K/UL (ref 1–5.1)
LYMPHOCYTES NFR BLD: 18.8 %
MCH RBC QN AUTO: 31.1 PG (ref 26–34)
MCHC RBC AUTO-ENTMCNC: 34 G/DL (ref 31–36)
MCV RBC AUTO: 91.4 FL (ref 80–100)
MONOCYTES # BLD: 1.5 K/UL (ref 0–1.3)
MONOCYTES NFR BLD: 10.1 %
NEUTROPHILS # BLD: 9.9 K/UL (ref 1.7–7.7)
NEUTROPHILS NFR BLD: 68.2 %
PLATELET # BLD AUTO: 518 K/UL (ref 135–450)
PMV BLD AUTO: 7.4 FL (ref 5–10.5)
POTASSIUM SERPL-SCNC: 4.3 MMOL/L (ref 3.5–5.1)
RBC # BLD AUTO: 4.29 M/UL (ref 4–5.2)
SODIUM SERPL-SCNC: 138 MMOL/L (ref 136–145)
WBC # BLD AUTO: 14.6 K/UL (ref 4–11)

## 2025-05-22 PROCEDURE — 99214 OFFICE O/P EST MOD 30 MIN: CPT | Performed by: SURGERY

## 2025-05-22 RX ORDER — METRONIDAZOLE 500 MG/1
500 TABLET ORAL 3 TIMES DAILY
Qty: 42 TABLET | Refills: 0 | Status: ON HOLD | OUTPATIENT
Start: 2025-05-22 | End: 2025-06-05

## 2025-05-22 RX ORDER — CIPROFLOXACIN 500 MG/1
500 TABLET, FILM COATED ORAL 2 TIMES DAILY
Qty: 28 TABLET | Refills: 0 | Status: ON HOLD | OUTPATIENT
Start: 2025-05-22 | End: 2025-06-05

## 2025-05-22 RX ORDER — IBUPROFEN 400 MG/1
400 TABLET, FILM COATED ORAL 3 TIMES DAILY PRN
Qty: 80 TABLET | Refills: 0 | Status: ON HOLD | OUTPATIENT
Start: 2025-05-22

## 2025-05-22 NOTE — PROGRESS NOTES
Established Patient Visit    [facility]  [facility]   Ashutosh Nunez MD    [address], [address]  [place], [place] [address]  Phone: [phone]  Fax: [phone]    Piper Walker   YOB: 1960    Date of Visit:  5/22/2025    No ref. provider found  Tata Loredo, APRN - CNP    Subjective:     Piper Walker presents for a follow-up of her hospital stay for sigmoid diverticulitis. Overall she has been doing poorly since her discharge.   She has been eating/drinking okay, but has had some constipation and overall was not feeling well.  She has some lower abdominal pain.  She denies any fevers or chills.         No Known Allergies  Outpatient Medications Marked as Taking for the 5/22/25 encounter (Office Visit) with Ashutosh Nunez MD   Medication Sig Dispense Refill    ciprofloxacin (CIPRO) 500 MG tablet Take 1 tablet by mouth 2 times daily for 14 days 28 tablet 0    metroNIDAZOLE (FLAGYL) 500 MG tablet Take 1 tablet by mouth 3 times daily for 14 days 42 tablet 0    ibuprofen (ADVIL;MOTRIN) 400 MG tablet Take 1 tablet by mouth 3 times daily as needed for Pain 80 tablet 0       Vitals:    05/22/25 1336   BP: 118/84   Pulse: (!) 102     There is no height or weight on file to calculate BMI.     Wt Readings from Last 3 Encounters:   05/10/25 52.5 kg (115 lb 11.9 oz)   04/26/25 52.7 kg (116 lb 2.9 oz)   09/18/24 51 kg (112 lb 7 oz)     BP Readings from Last 3 Encounters:   05/22/25 118/84   05/10/25 116/77   04/26/25 (!) 140/68          Objective:    CONSTITUTIONAL:  awake, alert, mild distress    LUNGS:  Resp easy and unlabored  ABDOMEN: Soft, nondistended, mild lower abdominal tenderness without peritonitis  MUSCULOSKELETAL: No edema  NEUROLOGIC:  Mental Status Exam:  Level of Alertness:   awake  Orientation:   person, place, time  SKIN: No cyanosis or clubbing        Radiology:  CT abd/pelvis from 5/7/25 showed:  IMPRESSION:  1. Extensive

## 2025-05-23 ENCOUNTER — TELEPHONE (OUTPATIENT)
Dept: VASCULAR SURGERY | Age: 65
End: 2025-05-23

## 2025-05-23 ENCOUNTER — HOSPITAL ENCOUNTER (OUTPATIENT)
Dept: CT IMAGING | Age: 65
Discharge: HOME | End: 2025-05-23
Attending: SURGERY

## 2025-05-23 DIAGNOSIS — K57.32 SIGMOID DIVERTICULITIS: ICD-10-CM

## 2025-05-23 DIAGNOSIS — N73.9 PELVIC ABSCESS IN FEMALE: Primary | ICD-10-CM

## 2025-05-23 PROCEDURE — 6360000004 HC RX CONTRAST MEDICATION: Performed by: SURGERY

## 2025-05-23 PROCEDURE — 74177 CT ABD & PELVIS W/CONTRAST: CPT

## 2025-05-23 RX ORDER — IOPAMIDOL 755 MG/ML
75 INJECTION, SOLUTION INTRAVASCULAR
Status: COMPLETED | OUTPATIENT
Start: 2025-05-23 | End: 2025-05-23

## 2025-05-23 RX ORDER — IOPAMIDOL 612 MG/ML
50 INJECTION, SOLUTION INTRAVASCULAR
Status: COMPLETED | OUTPATIENT
Start: 2025-05-23 | End: 2025-05-23

## 2025-05-23 RX ADMIN — IOPAMIDOL 75 ML: 755 INJECTION, SOLUTION INTRAVENOUS at 10:09

## 2025-05-23 RX ADMIN — IOPAMIDOL 50 ML: 612 INJECTION, SOLUTION INTRAVENOUS at 10:08

## 2025-05-23 NOTE — TELEPHONE ENCOUNTER
In order for patient to get scheduled for a CT, we need to call the day before and submit it as STAT.    Dee or Amparo, will you order on Tuesday 5/27 and call to advised they can call [address] [place] [address] to schedule for WED 5/28.     Thanks

## 2025-05-23 NOTE — PROGRESS NOTES
Surgery    Was able to discuss CT and lab findings with patient.  Patient able to get cipro/flagyl that was ordered yesterday.  She states that she feels better today.  Will plan to repeat CT imaging this upcoming week to see if her pelvic abscess needs to be drained.  We discussed that if she worsens over the weekend, she should call our office to be admitted for IV antibiotics and hydration.  The patient was in agreement.    Ashutosh Nunez MD

## 2025-05-23 NOTE — TELEPHONE ENCOUNTER
Can call Piper back and If you don't reach her  call stan please- saw  test results on My chart and asking about medication.

## 2025-05-23 NOTE — TELEPHONE ENCOUNTER
Patients daughter calling back stating that scheduling could not get patient scheduled for a CT scan on 5/28 due to being self pay. Please call and advise of plans.

## 2025-05-23 NOTE — TELEPHONE ENCOUNTER
Surgery    CT abdomen pelvis from today personally reviewed, showing 2.2 x 3.9 cm right pelvic abscess with air-fluid level.  Patient also has mild distention of small bowel and colon.  Labs revealed white blood cell count of 14 and creatinine of 0.8.    Attempted to call patient on her mobile phone, but there is no answer and voicemail box is full.  Called patient's daughter Jesusita James and was able to discuss lab and imaging findings.  The patient has started her new antibiotics that were prescribed yesterday in the office.  If she is able to stay hydrated and is either stable or improves over the weekend, we will try to keep her out of the hospital and repeat CT imaging this upcoming week.  If she has worsening symptoms or is unable to stay hydrated, she should be admitted for IV antibiotics and will need to call me through our office number to get that facilitated.  We discussed that she may need percutaneous drainage of her abscess, but this is challenging from a timing standpoint going into a holiday weekend.  The patient's daughter Jesusita was understanding of the patient's situation and in agreement with the plan.  Will attempt to call the patient back later to also be able to relay the plan to her.    Ashutosh Nunez MD

## 2025-05-27 ENCOUNTER — TELEPHONE (OUTPATIENT)
Dept: SURGERY | Age: 65
End: 2025-05-27

## 2025-05-27 DIAGNOSIS — N73.9 PELVIC ABSCESS IN FEMALE: ICD-10-CM

## 2025-05-27 DIAGNOSIS — K57.32 SIGMOID DIVERTICULITIS: Primary | ICD-10-CM

## 2025-05-28 ENCOUNTER — HOSPITAL ENCOUNTER (OUTPATIENT)
Dept: CT IMAGING | Age: 65
Discharge: HOME | End: 2025-05-28
Attending: SURGERY

## 2025-05-28 DIAGNOSIS — K57.32 SIGMOID DIVERTICULITIS: ICD-10-CM

## 2025-05-28 DIAGNOSIS — N73.9 PELVIC ABSCESS IN FEMALE: ICD-10-CM

## 2025-05-28 DIAGNOSIS — K57.32 SIGMOID DIVERTICULITIS: Primary | ICD-10-CM

## 2025-05-28 PROCEDURE — 74177 CT ABD & PELVIS W/CONTRAST: CPT

## 2025-05-28 PROCEDURE — 6360000004 HC RX CONTRAST MEDICATION: Performed by: SURGERY

## 2025-05-28 RX ORDER — IOPAMIDOL 755 MG/ML
75 INJECTION, SOLUTION INTRAVASCULAR
Status: COMPLETED | OUTPATIENT
Start: 2025-05-28 | End: 2025-05-28

## 2025-05-28 RX ADMIN — IOPAMIDOL 75 ML: 755 INJECTION, SOLUTION INTRAVENOUS at 09:54

## 2025-05-28 NOTE — PROGRESS NOTES
Surgery    CT abd/pelvis from today personally reviewed, showing enlargement of pelvic abscess now up to 6 cm.  Will arrange for perc drainage with IR.  Continue oral antibiotics.  Will adjust antibiotic regimen based on culture results.    Ashutosh Nunez MD

## 2025-05-29 ENCOUNTER — HOSPITAL ENCOUNTER (INPATIENT)
Age: 65
LOS: 4 days | Discharge: HOME | DRG: 392 | End: 2025-06-02
Attending: SURGERY | Admitting: SURGERY

## 2025-05-29 ENCOUNTER — HOSPITAL ENCOUNTER (INPATIENT)
Dept: CT IMAGING | Age: 65
Discharge: HOME | End: 2025-05-29
Attending: SURGERY | Admitting: SURGERY

## 2025-05-29 ENCOUNTER — DIRECT ADMIT ORDERS (OUTPATIENT)
Dept: SURGERY | Age: 65
End: 2025-05-29

## 2025-05-29 ENCOUNTER — TELEPHONE (OUTPATIENT)
Dept: VASCULAR SURGERY | Age: 65
End: 2025-05-29

## 2025-05-29 ENCOUNTER — HOSPITAL ENCOUNTER (EMERGENCY)
Age: 65
Discharge: HOME | End: 2025-05-29

## 2025-05-29 VITALS
SYSTOLIC BLOOD PRESSURE: 124 MMHG | TEMPERATURE: 97.5 F | OXYGEN SATURATION: 96 % | DIASTOLIC BLOOD PRESSURE: 74 MMHG | HEART RATE: 77 BPM | RESPIRATION RATE: 18 BRPM

## 2025-05-29 VITALS
DIASTOLIC BLOOD PRESSURE: 83 MMHG | TEMPERATURE: 98.1 F | HEIGHT: 65 IN | OXYGEN SATURATION: 98 % | RESPIRATION RATE: 16 BRPM | BODY MASS INDEX: 19.28 KG/M2 | HEART RATE: 76 BPM | WEIGHT: 115.74 LBS | SYSTOLIC BLOOD PRESSURE: 134 MMHG

## 2025-05-29 DIAGNOSIS — K65.1 INTRA-ABDOMINAL ABSCESS (HCC): Primary | ICD-10-CM

## 2025-05-29 DIAGNOSIS — K57.32 SIGMOID DIVERTICULITIS: ICD-10-CM

## 2025-05-29 DIAGNOSIS — K57.20 ABSCESS OF SIGMOID COLON DUE TO DIVERTICULITIS: Primary | ICD-10-CM

## 2025-05-29 PROBLEM — N73.9 PELVIC ABSCESS IN FEMALE: Status: ACTIVE | Noted: 2025-05-29

## 2025-05-29 LAB
ALBUMIN SERPL-MCNC: 3.4 G/DL (ref 3.4–5)
ALBUMIN/GLOB SERPL: 1.1 {RATIO} (ref 1.1–2.2)
ALP SERPL-CCNC: 81 U/L (ref 40–129)
ALT SERPL-CCNC: 7 U/L (ref 10–40)
ANION GAP SERPL CALCULATED.3IONS-SCNC: 13 MMOL/L (ref 3–16)
ANISOCYTOSIS BLD QL SMEAR: ABNORMAL
AST SERPL-CCNC: 24 U/L (ref 15–37)
BASOPHILS # BLD: 0 K/UL (ref 0–0.2)
BASOPHILS NFR BLD: 0 %
BILIRUB SERPL-MCNC: <0.2 MG/DL (ref 0–1)
BUN SERPL-MCNC: 15 MG/DL (ref 7–20)
BURR CELLS BLD QL SMEAR: ABNORMAL
CALCIUM SERPL-MCNC: 9 MG/DL (ref 8.3–10.6)
CHLORIDE SERPL-SCNC: 101 MMOL/L (ref 99–110)
CO2 SERPL-SCNC: 22 MMOL/L (ref 21–32)
CREAT SERPL-MCNC: 0.6 MG/DL (ref 0.6–1.2)
DEPRECATED RDW RBC AUTO: 13.5 % (ref 12.4–15.4)
EOSINOPHIL # BLD: 0.2 K/UL (ref 0–0.6)
EOSINOPHIL NFR BLD: 1 %
GFR SERPLBLD CREATININE-BSD FMLA CKD-EPI: >90 ML/MIN/{1.73_M2}
GLUCOSE SERPL-MCNC: 130 MG/DL (ref 70–99)
HCT VFR BLD AUTO: 35.6 % (ref 36–48)
HGB BLD-MCNC: 12.1 G/DL (ref 12–16)
HYPOCHROMIA BLD QL SMEAR: ABNORMAL
INR PPP: 1.25 (ref 0.85–1.15)
LYMPHOCYTES # BLD: 1.7 K/UL (ref 1–5.1)
LYMPHOCYTES NFR BLD: 7 %
MCH RBC QN AUTO: 30.4 PG (ref 26–34)
MCHC RBC AUTO-ENTMCNC: 34 G/DL (ref 31–36)
MCV RBC AUTO: 89.3 FL (ref 80–100)
MONOCYTES # BLD: 1.7 K/UL (ref 0–1.3)
MONOCYTES NFR BLD: 8 %
NEUTROPHILS # BLD: 18.1 K/UL (ref 1.7–7.7)
NEUTROPHILS NFR BLD: 79 %
NEUTS BAND NFR BLD MANUAL: 4 % (ref 0–7)
NEUTS VAC BLD QL SMEAR: PRESENT
PLATELET # BLD AUTO: 544 K/UL (ref 135–450)
PLATELET BLD QL SMEAR: ABNORMAL
PMV BLD AUTO: 7.2 FL (ref 5–10.5)
POIKILOCYTOSIS BLD QL SMEAR: ABNORMAL
POLYCHROMASIA BLD QL SMEAR: ABNORMAL
POTASSIUM SERPL-SCNC: 4.4 MMOL/L (ref 3.5–5.1)
PROT SERPL-MCNC: 6.5 G/DL (ref 6.4–8.2)
PROTHROMBIN TIME: 15.9 SEC (ref 11.9–14.9)
RBC # BLD AUTO: 3.98 M/UL (ref 4–5.2)
SLIDE REVIEW: ABNORMAL
SODIUM SERPL-SCNC: 136 MMOL/L (ref 136–145)
TOXIC GRANULES BLD QL SMEAR: PRESENT
VARIANT LYMPHS NFR BLD MANUAL: 1 % (ref 0–6)
WBC # BLD AUTO: 21.8 K/UL (ref 4–11)

## 2025-05-29 PROCEDURE — 85610 PROTHROMBIN TIME: CPT

## 2025-05-29 PROCEDURE — 87070 CULTURE OTHR SPECIMN AEROBIC: CPT

## 2025-05-29 PROCEDURE — 6370000000 HC RX 637 (ALT 250 FOR IP): Performed by: SURGERY

## 2025-05-29 PROCEDURE — 99283 EMERGENCY DEPT VISIT LOW MDM: CPT

## 2025-05-29 PROCEDURE — 2500000003 HC RX 250 WO HCPCS: Performed by: SURGERY

## 2025-05-29 PROCEDURE — 99222 1ST HOSP IP/OBS MODERATE 55: CPT | Performed by: SURGERY

## 2025-05-29 PROCEDURE — 7100000010 HC PHASE II RECOVERY - FIRST 15 MIN: Performed by: RADIOLOGY

## 2025-05-29 PROCEDURE — 6360000002 HC RX W HCPCS: Performed by: SURGERY

## 2025-05-29 PROCEDURE — 2709999900 CT PERITONEAL/RETROPERITONEAL PERC DRAIN

## 2025-05-29 PROCEDURE — 94760 N-INVAS EAR/PLS OXIMETRY 1: CPT

## 2025-05-29 PROCEDURE — 87076 CULTURE ANAEROBE IDENT EACH: CPT

## 2025-05-29 PROCEDURE — 2580000003 HC RX 258: Performed by: SURGERY

## 2025-05-29 PROCEDURE — 80053 COMPREHEN METABOLIC PANEL: CPT

## 2025-05-29 PROCEDURE — 7100000011 HC PHASE II RECOVERY - ADDTL 15 MIN: Performed by: RADIOLOGY

## 2025-05-29 PROCEDURE — 6360000002 HC RX W HCPCS: Performed by: RADIOLOGY

## 2025-05-29 PROCEDURE — 85025 COMPLETE CBC W/AUTO DIFF WBC: CPT

## 2025-05-29 PROCEDURE — 87205 SMEAR GRAM STAIN: CPT

## 2025-05-29 PROCEDURE — 87075 CULTR BACTERIA EXCEPT BLOOD: CPT

## 2025-05-29 PROCEDURE — 2580000003 HC RX 258: Performed by: STUDENT IN AN ORGANIZED HEALTH CARE EDUCATION/TRAINING PROGRAM

## 2025-05-29 PROCEDURE — 87077 CULTURE AEROBIC IDENTIFY: CPT

## 2025-05-29 PROCEDURE — 1200000000 HC SEMI PRIVATE

## 2025-05-29 RX ORDER — SODIUM CHLORIDE 0.9 % (FLUSH) 0.9 %
5-40 SYRINGE (ML) INJECTION PRN
Status: CANCELLED | OUTPATIENT
Start: 2025-05-29

## 2025-05-29 RX ORDER — SODIUM CHLORIDE 9 MG/ML
INJECTION, SOLUTION INTRAVENOUS CONTINUOUS
Status: DISCONTINUED | OUTPATIENT
Start: 2025-05-29 | End: 2025-06-02 | Stop reason: HOSPADM

## 2025-05-29 RX ORDER — FAMOTIDINE 20 MG/1
20 TABLET, FILM COATED ORAL 2 TIMES DAILY
Status: DISCONTINUED | OUTPATIENT
Start: 2025-05-29 | End: 2025-06-02 | Stop reason: HOSPADM

## 2025-05-29 RX ORDER — MORPHINE SULFATE 2 MG/ML
4 INJECTION, SOLUTION INTRAMUSCULAR; INTRAVENOUS
Refills: 0 | Status: CANCELLED | OUTPATIENT
Start: 2025-05-29

## 2025-05-29 RX ORDER — OXYCODONE HYDROCHLORIDE 10 MG/1
10 TABLET ORAL EVERY 4 HOURS PRN
Refills: 0 | Status: DISCONTINUED | OUTPATIENT
Start: 2025-05-29 | End: 2025-06-02 | Stop reason: HOSPADM

## 2025-05-29 RX ORDER — ONDANSETRON 4 MG/1
4 TABLET, ORALLY DISINTEGRATING ORAL EVERY 8 HOURS PRN
Status: CANCELLED | OUTPATIENT
Start: 2025-05-29

## 2025-05-29 RX ORDER — OXYCODONE HYDROCHLORIDE 5 MG/1
10 TABLET ORAL EVERY 4 HOURS PRN
Refills: 0 | Status: CANCELLED | OUTPATIENT
Start: 2025-05-29

## 2025-05-29 RX ORDER — MORPHINE SULFATE 2 MG/ML
2 INJECTION, SOLUTION INTRAMUSCULAR; INTRAVENOUS
Refills: 0 | Status: DISCONTINUED | OUTPATIENT
Start: 2025-05-29 | End: 2025-06-02 | Stop reason: HOSPADM

## 2025-05-29 RX ORDER — POTASSIUM CHLORIDE 7.45 MG/ML
10 INJECTION INTRAVENOUS PRN
Status: DISCONTINUED | OUTPATIENT
Start: 2025-05-29 | End: 2025-06-02 | Stop reason: HOSPADM

## 2025-05-29 RX ORDER — FAMOTIDINE 20 MG/1
20 TABLET, FILM COATED ORAL 2 TIMES DAILY
Status: CANCELLED | OUTPATIENT
Start: 2025-05-29

## 2025-05-29 RX ORDER — ACETAMINOPHEN 325 MG/1
1000 TABLET ORAL EVERY 8 HOURS SCHEDULED
Status: CANCELLED | OUTPATIENT
Start: 2025-05-29

## 2025-05-29 RX ORDER — POTASSIUM CHLORIDE 1500 MG/1
40 TABLET, EXTENDED RELEASE ORAL PRN
Status: DISCONTINUED | OUTPATIENT
Start: 2025-05-29 | End: 2025-06-02 | Stop reason: HOSPADM

## 2025-05-29 RX ORDER — SENNA AND DOCUSATE SODIUM 50; 8.6 MG/1; MG/1
1 TABLET, FILM COATED ORAL 2 TIMES DAILY
Status: DISCONTINUED | OUTPATIENT
Start: 2025-05-29 | End: 2025-06-02 | Stop reason: HOSPADM

## 2025-05-29 RX ORDER — SODIUM CHLORIDE, SODIUM LACTATE, POTASSIUM CHLORIDE, CALCIUM CHLORIDE 600; 310; 30; 20 MG/100ML; MG/100ML; MG/100ML; MG/100ML
INJECTION, SOLUTION INTRAVENOUS CONTINUOUS
Status: DISCONTINUED | OUTPATIENT
Start: 2025-05-29 | End: 2025-05-29 | Stop reason: ALTCHOICE

## 2025-05-29 RX ORDER — METRONIDAZOLE 500 MG/100ML
500 INJECTION, SOLUTION INTRAVENOUS EVERY 8 HOURS
Status: DISCONTINUED | OUTPATIENT
Start: 2025-05-29 | End: 2025-05-29

## 2025-05-29 RX ORDER — SENNA AND DOCUSATE SODIUM 50; 8.6 MG/1; MG/1
1 TABLET, FILM COATED ORAL 2 TIMES DAILY
Status: CANCELLED | OUTPATIENT
Start: 2025-05-29

## 2025-05-29 RX ORDER — POTASSIUM CHLORIDE 7.45 MG/ML
10 INJECTION INTRAVENOUS PRN
Status: CANCELLED | OUTPATIENT
Start: 2025-05-29

## 2025-05-29 RX ORDER — SODIUM CHLORIDE 0.9 % (FLUSH) 0.9 %
5-40 SYRINGE (ML) INJECTION EVERY 12 HOURS SCHEDULED
Status: DISCONTINUED | OUTPATIENT
Start: 2025-05-29 | End: 2025-06-02 | Stop reason: HOSPADM

## 2025-05-29 RX ORDER — FENTANYL CITRATE 50 UG/ML
INJECTION, SOLUTION INTRAMUSCULAR; INTRAVENOUS PRN
Status: COMPLETED | OUTPATIENT
Start: 2025-05-29 | End: 2025-05-29

## 2025-05-29 RX ORDER — ACETAMINOPHEN 500 MG
1000 TABLET ORAL EVERY 8 HOURS SCHEDULED
Status: DISCONTINUED | OUTPATIENT
Start: 2025-05-29 | End: 2025-06-02 | Stop reason: HOSPADM

## 2025-05-29 RX ORDER — ONDANSETRON 2 MG/ML
4 INJECTION INTRAMUSCULAR; INTRAVENOUS EVERY 6 HOURS PRN
Status: CANCELLED | OUTPATIENT
Start: 2025-05-29

## 2025-05-29 RX ORDER — OXYCODONE HYDROCHLORIDE 5 MG/1
5 TABLET ORAL EVERY 4 HOURS PRN
Refills: 0 | Status: CANCELLED | OUTPATIENT
Start: 2025-05-29

## 2025-05-29 RX ORDER — SODIUM CHLORIDE, SODIUM LACTATE, POTASSIUM CHLORIDE, CALCIUM CHLORIDE 600; 310; 30; 20 MG/100ML; MG/100ML; MG/100ML; MG/100ML
INJECTION, SOLUTION INTRAVENOUS CONTINUOUS
Status: CANCELLED | OUTPATIENT
Start: 2025-05-29

## 2025-05-29 RX ORDER — SODIUM CHLORIDE 0.9 % (FLUSH) 0.9 %
5-40 SYRINGE (ML) INJECTION EVERY 12 HOURS SCHEDULED
Status: CANCELLED | OUTPATIENT
Start: 2025-05-29

## 2025-05-29 RX ORDER — SODIUM CHLORIDE 9 MG/ML
INJECTION, SOLUTION INTRAVENOUS PRN
Status: DISCONTINUED | OUTPATIENT
Start: 2025-05-29 | End: 2025-06-02 | Stop reason: HOSPADM

## 2025-05-29 RX ORDER — POTASSIUM CHLORIDE 1500 MG/1
40 TABLET, EXTENDED RELEASE ORAL PRN
Status: CANCELLED | OUTPATIENT
Start: 2025-05-29

## 2025-05-29 RX ORDER — MAGNESIUM SULFATE IN WATER 40 MG/ML
2000 INJECTION, SOLUTION INTRAVENOUS PRN
Status: DISCONTINUED | OUTPATIENT
Start: 2025-05-29 | End: 2025-06-02 | Stop reason: HOSPADM

## 2025-05-29 RX ORDER — ONDANSETRON 4 MG/1
4 TABLET, ORALLY DISINTEGRATING ORAL EVERY 8 HOURS PRN
Status: DISCONTINUED | OUTPATIENT
Start: 2025-05-29 | End: 2025-06-02 | Stop reason: HOSPADM

## 2025-05-29 RX ORDER — ENOXAPARIN SODIUM 100 MG/ML
40 INJECTION SUBCUTANEOUS DAILY
Status: DISCONTINUED | OUTPATIENT
Start: 2025-05-29 | End: 2025-06-02 | Stop reason: HOSPADM

## 2025-05-29 RX ORDER — MORPHINE SULFATE 2 MG/ML
2 INJECTION, SOLUTION INTRAMUSCULAR; INTRAVENOUS
Refills: 0 | Status: CANCELLED | OUTPATIENT
Start: 2025-05-29

## 2025-05-29 RX ORDER — MIDAZOLAM HYDROCHLORIDE 1 MG/ML
INJECTION, SOLUTION INTRAMUSCULAR; INTRAVENOUS PRN
Status: COMPLETED | OUTPATIENT
Start: 2025-05-29 | End: 2025-05-29

## 2025-05-29 RX ORDER — LEVOFLOXACIN 5 MG/ML
500 INJECTION, SOLUTION INTRAVENOUS EVERY 24 HOURS
Status: DISCONTINUED | OUTPATIENT
Start: 2025-05-29 | End: 2025-05-29

## 2025-05-29 RX ORDER — ONDANSETRON 2 MG/ML
4 INJECTION INTRAMUSCULAR; INTRAVENOUS EVERY 6 HOURS PRN
Status: DISCONTINUED | OUTPATIENT
Start: 2025-05-29 | End: 2025-06-02 | Stop reason: HOSPADM

## 2025-05-29 RX ORDER — ENOXAPARIN SODIUM 100 MG/ML
40 INJECTION SUBCUTANEOUS DAILY
Status: CANCELLED | OUTPATIENT
Start: 2025-05-29

## 2025-05-29 RX ORDER — SODIUM CHLORIDE 0.9 % (FLUSH) 0.9 %
5-40 SYRINGE (ML) INJECTION PRN
Status: DISCONTINUED | OUTPATIENT
Start: 2025-05-29 | End: 2025-06-02 | Stop reason: HOSPADM

## 2025-05-29 RX ORDER — SODIUM CHLORIDE 9 MG/ML
INJECTION, SOLUTION INTRAVENOUS PRN
Status: CANCELLED | OUTPATIENT
Start: 2025-05-29

## 2025-05-29 RX ORDER — OXYCODONE HYDROCHLORIDE 5 MG/1
5 TABLET ORAL EVERY 4 HOURS PRN
Refills: 0 | Status: DISCONTINUED | OUTPATIENT
Start: 2025-05-29 | End: 2025-06-02 | Stop reason: HOSPADM

## 2025-05-29 RX ORDER — MORPHINE SULFATE 4 MG/ML
4 INJECTION, SOLUTION INTRAMUSCULAR; INTRAVENOUS
Refills: 0 | Status: DISCONTINUED | OUTPATIENT
Start: 2025-05-29 | End: 2025-06-02 | Stop reason: HOSPADM

## 2025-05-29 RX ADMIN — SODIUM CHLORIDE, PRESERVATIVE FREE 10 ML: 5 INJECTION INTRAVENOUS at 19:44

## 2025-05-29 RX ADMIN — SENNOSIDES AND DOCUSATE SODIUM 1 TABLET: 8.6; 5 TABLET ORAL at 19:44

## 2025-05-29 RX ADMIN — MIDAZOLAM 2 MG: 1 INJECTION INTRAMUSCULAR; INTRAVENOUS at 08:31

## 2025-05-29 RX ADMIN — ACETAMINOPHEN 1000 MG: 500 TABLET ORAL at 13:47

## 2025-05-29 RX ADMIN — ACETAMINOPHEN 1000 MG: 500 TABLET ORAL at 23:30

## 2025-05-29 RX ADMIN — OXYCODONE HYDROCHLORIDE 10 MG: 10 TABLET ORAL at 11:11

## 2025-05-29 RX ADMIN — METRONIDAZOLE 500 MG: 500 INJECTION, SOLUTION INTRAVENOUS at 11:48

## 2025-05-29 RX ADMIN — FENTANYL CITRATE 50 MCG: 50 INJECTION INTRAMUSCULAR; INTRAVENOUS at 08:31

## 2025-05-29 RX ADMIN — MIDAZOLAM 1 MG: 1 INJECTION INTRAMUSCULAR; INTRAVENOUS at 08:42

## 2025-05-29 RX ADMIN — ONDANSETRON 4 MG: 2 INJECTION, SOLUTION INTRAMUSCULAR; INTRAVENOUS at 13:46

## 2025-05-29 RX ADMIN — ENOXAPARIN SODIUM 40 MG: 100 INJECTION SUBCUTANEOUS at 11:49

## 2025-05-29 RX ADMIN — FAMOTIDINE 20 MG: 20 TABLET, FILM COATED ORAL at 11:49

## 2025-05-29 RX ADMIN — OXYCODONE HYDROCHLORIDE 10 MG: 10 TABLET ORAL at 19:44

## 2025-05-29 RX ADMIN — SODIUM CHLORIDE: 0.9 INJECTION, SOLUTION INTRAVENOUS at 20:53

## 2025-05-29 RX ADMIN — FAMOTIDINE 20 MG: 20 TABLET, FILM COATED ORAL at 19:44

## 2025-05-29 RX ADMIN — SODIUM CHLORIDE, SODIUM LACTATE, POTASSIUM CHLORIDE, AND CALCIUM CHLORIDE: .6; .31; .03; .02 INJECTION, SOLUTION INTRAVENOUS at 11:47

## 2025-05-29 RX ADMIN — PIPERACILLIN AND TAZOBACTAM 3375 MG: 3; .375 INJECTION, POWDER, LYOPHILIZED, FOR SOLUTION INTRAVENOUS at 19:43

## 2025-05-29 RX ADMIN — SENNOSIDES AND DOCUSATE SODIUM 1 TABLET: 8.6; 5 TABLET ORAL at 11:49

## 2025-05-29 RX ADMIN — OXYCODONE HYDROCHLORIDE 10 MG: 10 TABLET ORAL at 23:30

## 2025-05-29 ASSESSMENT — PAIN DESCRIPTION - ONSET
ONSET: ON-GOING

## 2025-05-29 ASSESSMENT — PAIN DESCRIPTION - PAIN TYPE
TYPE: ACUTE PAIN;SURGICAL PAIN
TYPE: SURGICAL PAIN
TYPE: SURGICAL PAIN;ACUTE PAIN
TYPE: ACUTE PAIN;SURGICAL PAIN
TYPE: ACUTE PAIN;SURGICAL PAIN

## 2025-05-29 ASSESSMENT — PAIN DESCRIPTION - ORIENTATION
ORIENTATION: LOWER
ORIENTATION: RIGHT;LOWER
ORIENTATION: RIGHT
ORIENTATION: MID;LOWER
ORIENTATION: LOWER
ORIENTATION: LOWER
ORIENTATION: MID

## 2025-05-29 ASSESSMENT — PAIN DESCRIPTION - DESCRIPTORS
DESCRIPTORS: NUMBNESS;ACHING
DESCRIPTORS: ACHING
DESCRIPTORS: ACHING;THROBBING
DESCRIPTORS: ACHING
DESCRIPTORS: ACHING;CRAMPING
DESCRIPTORS: ACHING;CRAMPING
DESCRIPTORS: ACHING
DESCRIPTORS: ACHING

## 2025-05-29 ASSESSMENT — PAIN DESCRIPTION - FREQUENCY
FREQUENCY: CONTINUOUS

## 2025-05-29 ASSESSMENT — PAIN - FUNCTIONAL ASSESSMENT
PAIN_FUNCTIONAL_ASSESSMENT: PREVENTS OR INTERFERES SOME ACTIVE ACTIVITIES AND ADLS
PAIN_FUNCTIONAL_ASSESSMENT: ACTIVITIES ARE NOT PREVENTED
PAIN_FUNCTIONAL_ASSESSMENT: ACTIVITIES ARE NOT PREVENTED
PAIN_FUNCTIONAL_ASSESSMENT: PREVENTS OR INTERFERES SOME ACTIVE ACTIVITIES AND ADLS
PAIN_FUNCTIONAL_ASSESSMENT: ACTIVITIES ARE NOT PREVENTED
PAIN_FUNCTIONAL_ASSESSMENT: 0-10
PAIN_FUNCTIONAL_ASSESSMENT: ACTIVITIES ARE NOT PREVENTED

## 2025-05-29 ASSESSMENT — PAIN DESCRIPTION - LOCATION
LOCATION: ABDOMEN
LOCATION: ABDOMEN;BACK
LOCATION: ABDOMEN;BACK
LOCATION: BACK
LOCATION: LEG
LOCATION: ABDOMEN;BUTTOCKS
LOCATION: ABDOMEN

## 2025-05-29 ASSESSMENT — PAIN SCALES - GENERAL
PAINLEVEL_OUTOF10: 6
PAINLEVEL_OUTOF10: 9
PAINLEVEL_OUTOF10: 10
PAINLEVEL_OUTOF10: 9
PAINLEVEL_OUTOF10: 7
PAINLEVEL_OUTOF10: 6
PAINLEVEL_OUTOF10: 9

## 2025-05-29 ASSESSMENT — LIFESTYLE VARIABLES: HOW OFTEN DO YOU HAVE A DRINK CONTAINING ALCOHOL: NEVER

## 2025-05-29 ASSESSMENT — ENCOUNTER SYMPTOMS: ABDOMINAL PAIN: 1

## 2025-05-29 NOTE — PRE SEDATION
Sedation Pre-Procedure Note    Patient Name: Piper Walker  YOB: 1960  Medical Record Number: [id number]  Date:  5/29/25  Time: 8:31 AM    Indication:  Percutaneous drain for pelvic abscess    Consent: I have discussed with the patient and/or the patient representative the indication, alternatives, and the possible risks and/or complications of the planned procedure and the anesthesia methods. The patient and/or patient representative appear to understand and agree to proceed.    Vital Signs:   Vitals:    05/29/25 0825   BP: 128/80   Pulse: 91   Resp: 20   SpO2: 100%       Labs  Lab Results   Component Value Date    INR 1.25 (H) 05/29/2025    PROTIME 15.9 (H) 05/29/2025     Lab Results   Component Value Date     (H) 05/29/2025     Estimated Creatinine Clearance: 80 mL/min (based on SCr of 0.6 mg/dL).     Allergies:  No Known Allergies     Past Medical History:  Past Medical History:   Diagnosis Date    Adrenal mass     Aneurysm, cerebral     Cerebral artery occlusion with cerebral infarction (HCC)     Depression     Diverticulosis     Liver mass     Rectal bleed     Seizures (HCC)        Past Surgical History:  Past Surgical History:   Procedure Laterality Date    BRAIN SURGERY      Aneurism surgery - 2 aneurisms repaired - 1 remains    BREAST SURGERY Right     biopsy    COLONOSCOPY      TUBAL LIGATION         Medications:   Current Outpatient Medications   Medication Sig Dispense Refill    ciprofloxacin (CIPRO) 500 MG tablet Take 1 tablet by mouth 2 times daily for 14 days 28 tablet 0    metroNIDAZOLE (FLAGYL) 500 MG tablet Take 1 tablet by mouth 3 times daily for 14 days 42 tablet 0    ibuprofen (ADVIL;MOTRIN) 400 MG tablet Take 1 tablet by mouth 3 times daily as needed for Pain 80 tablet 0    docusate sodium (COLACE) 100 MG capsule Take 1 capsule by mouth 2 times daily 60 capsule 0    polyethylene glycol (GLYCOLAX) 17 GM/SCOOP powder Take 17 g by mouth daily 510 g 0    metoclopramide  MCG/ACT inhaler Inhale 2 puffs into the lungs 4 times daily as needed for Wheezing  Papa Richards MD       Recent relevant anticoagulation/antiplatelet therapy:  no    Pre-Sedation Documentation:   I have reviewed the patient's history and review of systems.  I have performed a targeted H&P and review of systems.    Exam:  General: NAD  Lungs: clear  Cardiovascular: normal.    Mallampati Airway Assessment:  Mallampati Class II - (soft palate, fauces & uvula are visible)    Prior History of Anesthesia Complications:   none    ASA Classification:  Class 2 - A normal healthy patient with mild systemic disease    Sedation/ Anesthesia Plan:   intravenous sedation    Medications Planned:   midazolam (Versed) intravenously and fentanyl intravenously

## 2025-05-29 NOTE — PROGRESS NOTES
Report called to floor RN. All questions answered at this time. Pt to be transported to room #4256. Pt and daughter aware of plan.

## 2025-05-29 NOTE — DISCHARGE INSTRUCTIONS
INTERVENTIONAL RADIOLOGY DEPARTMENT  [facility]  [address], [place] [address]  Telephone: [phone]      PATIENT NAME: Piper Walker  MEDICAL RECORD NUMBER:  [id number]  TODAY'S DATE: @ED@      Discharge Instructions - Post Abscess Drain Placement.    How can you care for yourself at home?  Wash your hands before you handle the drainage tube.  Clean the area around the tube with soap and water every day.  If you have been told you can reuse your bag, you can clean the bag after removing it from the tube.  To clean the bag, fill it with 2 parts vinegar to 3 parts water, and let it stand for 20 minutes. Then empty it out, and let it air dry.  Empty the drainage bag before it is completely full or every 2 to 3 hours.  Do not swim or take baths while you have a abscess drain. You can shower after wrapping the end of the drain with plastic wrap.  If the device that holds the tube comes loose, please call #[phone] and ask for the radiology nurse.  No lifting over 10 lbs for the next 3-4 days      When should you call for help?  Call your doctor now or seek immediate medical care if:  Your drain becomes blocked.  Your tube leaks.  You have pain in your back just below your rib cage. This is called flank pain.  You have a fever, chills, or body aches.  You have groin or belly pain.  You have pain or bleeding around the tube.  You have swelling around the catheter.    Watch closely for changes in your health, and be sure to contact your doctor if:  You need more help to learn how to care for your tube.

## 2025-05-29 NOTE — ED PROVIDER NOTES
[facility] EMERGENCY DEPARTMENT  EMERGENCY DEPARTMENT ENCOUNTER        Pt Name: Piper Walker  MRN: [id number]  Birthdate 1960  Date of evaluation: 5/29/2025  Provider: LESLI Red  PCP: Tata Loredo, SHAZIA - CNP  Note Started: 7:14 AM EDT 5/29/25      MARY. I have evaluated this patient.        CHIEF COMPLAINT       No chief complaint on file.      HISTORY OF PRESENT ILLNESS: 1 or more Elements     History From: patient and patient's daughter  Limitations to history : None    Piper Walker is a 65 y.o. female who presents to the emergency room with her daughter with abdominal pain this was to be at interventional radiology to have drain placed today.  The daughter states that they called as the patient was still having some pain so the office advised them to go to the emergency department not knowing that she need to be at interventional radiology to have drain placed today for intra-abdominal abscess.  Patient states her pain has been in the lower abdomen ever since has been going on for last week or so.  She has not had much to eat or drink and states that she stopped eating last night as instructed prior to drain placement.    Nursing Notes were all reviewed and agreed with or any disagreements were addressed in the HPI.    REVIEW OF SYSTEMS :      Review of Systems   Gastrointestinal:  Positive for abdominal pain.       Positives and Pertinent negatives as per HPI.     SURGICAL HISTORY     Past Surgical History:   Procedure Laterality Date    BRAIN SURGERY      Aneurism surgery - 2 aneurisms repaired - 1 remains    BREAST SURGERY Right     biopsy    COLONOSCOPY      TUBAL LIGATION         CURRENTMEDICATIONS       Previous Medications    ACETAMINOPHEN (TYLENOL) 500 MG TABLET    Take 2 tablets by mouth every 6 hours as needed for Pain    ALBUTEROL SULFATE HFA (VENTOLIN HFA) 108 (90 BASE) MCG/ACT INHALER    Inhale 2 puffs into the lungs 4 times daily as needed for Wheezing

## 2025-05-29 NOTE — H&P
General Surgery History & Physical      Piper Walker   : 1960 MRN: [id number]  Date of Admission: 2025  Admitting Physician:Ashutosh Nunez MD  Primary Care Physician: Tata Loredo, APRN - CNP    No chief complaint on file.      Chief Complaint/Reason for consultation: \"pelvic abscess\"    Diagnosis Present on Admission:   Abscess of sigmoid colon due to diverticulitis      Secondary Diagnosis  Patient Active Problem List   Diagnosis    Diverticulosis of intestine without bleeding    Hematuria    Liver mass    Adenoma, adrenal cortical    Cigarette nicotine dependence without complication    Diverticulosis of large intestine with hemorrhage    Recurrent major depressive disorder, in remission    Sepsis without acute organ dysfunction, due to unspecified organism (HCC)    Tobacco abuse    Sigmoid diverticulitis    Leukocytosis    Pelvic abscess in female    Abscess of sigmoid colon due to diverticulitis       History of Present Illness  Piper Walker is a 65 y.o. female who was recently admitted for sigmoid diverticulitis three weeks ago who has had progressive pain over the past 10 days.  She was seen in the office and outpatient CT showed a 4 cm pelvic abscess.  Her antibiotics were changed and repeat CT scan showed a 6 cm pelvic abscess.  She was arranged for a percutaneous drainage today with radiology, but did have worsening symptoms over the past 24 hours requiring an emergency department visit.  Due to her pain, leukocytosis of 21K, and her pelvic abscess s/p perc drain, she is being admitted for pain control and IV antibiotics.      Past Medical History:   Diagnosis Date    Adrenal mass     Aneurysm, cerebral     Cerebral artery occlusion with cerebral infarction (HCC)     Depression     Diverticulosis     Liver mass     Rectal bleed     Seizures (HCC)      Past Surgical History:   Procedure Laterality Date    BRAIN SURGERY      Aneurism surgery - 2 aneurisms repaired - 1

## 2025-05-29 NOTE — PROGRESS NOTES
Pt arrives to phase II recovery. Pt is fully alert. VSS. Pt does complain of abdominal pain that is 9/10 aching. Pt provided with ice water and warm blanket. Pt's daughter at the bedside.

## 2025-05-29 NOTE — PROGRESS NOTES
Order placed by Dr. Nunez to admit pt. Bed requested. Will await additional floor orders and bed assignment.

## 2025-05-29 NOTE — TELEPHONE ENCOUNTER
Patient is having a drain tube procedure this morning.  Calling regarding bloodwork and pain medication.

## 2025-05-29 NOTE — TELEPHONE ENCOUNTER
Spoke with Jesusita. Patient is back in the IR procedure now. But she is not feeling well. Dr. Nunez will discuss with family regarding Direct admitting her. We will call her back after Enrique looks over ED visit notes.

## 2025-05-29 NOTE — ED TRIAGE NOTES
Pt to ED c/o abdominal pain. Pt has a known pelvic abscess from Diverticulitis that she is supposed to go to IR with Dr. Nunez today at 0800 for a drain placement. Pt came to ED d/t uncontrolled pain and N/V. Upon arrival to ED VSS on room air.

## 2025-05-29 NOTE — PROGRESS NOTES
Pt discharged and wheeled via wheelchair to IR holding area. PIV remains in place. Pt assisted into IR stretcher and MIKAYLA Fletcher RN at bedside.

## 2025-05-29 NOTE — PROGRESS NOTES
4 Eyes Skin Assessment     NAME:  Piper Walker  YOB: 1960  MEDICAL RECORD NUMBER:  [id number]    The patient is being assessed for  Admission    I agree that at least one RN has performed a thorough Head to Toe Skin Assessment on the patient. ALL assessment sites listed below have been assessed.      Areas assessed by both nurses:    Head, Face, Ears, Shoulders, Back, Chest, Arms, Elbows, Hands, Sacrum. Buttock, Coccyx, Ischium, Legs. Feet and Heels, and Under Medical Devices         Does the Patient have a Wound? No noted wound(s)       Jesus Prevention initiated by RN: Yes  Wound Care Orders initiated by RN: No    Pressure Injury (Stage 3,4, Unstageable, DTI, NWPT, and Complex wounds) if present, place Wound referral order by RN under : No    New Ostomies, if present place, Ostomy referral order under : No     Nurse 1 eSignature: Electronically signed by Rosie Mcginnis RN on 5/29/25 at 12:02 PM EDT    **SHARE this note so that the co-signing nurse can place an eSignature**    Nurse 2 eSignature: Electronically signed by Mitch Gaspar RN on 5/29/25 at 12:05 PM EDT

## 2025-05-29 NOTE — PROGRESS NOTES
Pt admitted to 4256 from pacu. VSS. Pt has 10/10 pain PRN given. Admission questions and assessment completed. Medications given. Pt turns self but will be reminded as needed. Pt is tearful, emotional support given. Pt has no questions or concerns. Standard safety measures in place.     Electronically signed by Rosie Mcginnis RN on 5/29/2025 at 12:02 PM

## 2025-05-29 NOTE — PROGRESS NOTES
Advance Care Planning     Advance Care Planning Inpatient Note  [facility] Department    Today's Date: 5/29/2025  Unit: [facility]N MED SURG    Received request from patient.  Upon review of chart and communication with care team, patient's decision making abilities are not in question.. Patient was/were present in the room during visit.    Goals of ACP Conversation:  Discuss advance care planning documents  Facilitate a discussion related to patient's goals of care as they align with the patient's values and beliefs.    Health Care Decision Makers:       Primary Decision Maker: Jesusita James - Child - [phone]  Summary:  Completed New Documents    Advance Care Planning Documents (Patient Wishes):  Healthcare Power of /Advance Directive Appointment of Health Care Agent  Living Will/Advance Directive     Assessment:  Discussed and completed HCPOA and LW with pt, naming her daughter Charity James as decision maker.  Copy of documents in pt chart to be scanned into electronic chart upon discharge.  Pt has originals plus a copy for her daughter.  Pt has no questions at this time.        Interventions:  Provided education on documents for clarity and greater understanding  Assisted in the completion of documents according to patient's wishes at this time    Care Preferences Communicated:   No    Outcomes/Plan:  ACP Discussion: Completed    Electronically signed by Chaplain Ada on 5/29/2025 at 6:12 PM

## 2025-05-30 PROBLEM — J44.9 CHRONIC OBSTRUCTIVE PULMONARY DISEASE (HCC): Status: ACTIVE | Noted: 2025-05-30

## 2025-05-30 LAB
ANION GAP SERPL CALCULATED.3IONS-SCNC: 9 MMOL/L (ref 3–16)
BASOPHILS # BLD: 0 K/UL (ref 0–0.2)
BASOPHILS NFR BLD: 0 %
BUN SERPL-MCNC: 11 MG/DL (ref 7–20)
CALCIUM SERPL-MCNC: 8.5 MG/DL (ref 8.3–10.6)
CHLORIDE SERPL-SCNC: 104 MMOL/L (ref 99–110)
CO2 SERPL-SCNC: 23 MMOL/L (ref 21–32)
CREAT SERPL-MCNC: 0.6 MG/DL (ref 0.6–1.2)
DEPRECATED RDW RBC AUTO: 14 % (ref 12.4–15.4)
EOSINOPHIL # BLD: 0.2 K/UL (ref 0–0.6)
EOSINOPHIL NFR BLD: 1 %
GFR SERPLBLD CREATININE-BSD FMLA CKD-EPI: >90 ML/MIN/{1.73_M2}
GLUCOSE SERPL-MCNC: 117 MG/DL (ref 70–99)
HCT VFR BLD AUTO: 35 % (ref 36–48)
HGB BLD-MCNC: 11.9 G/DL (ref 12–16)
LACTATE BLDV-SCNC: 1.5 MMOL/L (ref 0.4–2)
LYMPHOCYTES # BLD: 1.7 K/UL (ref 1–5.1)
LYMPHOCYTES NFR BLD: 11 %
MAGNESIUM SERPL-MCNC: 1.85 MG/DL (ref 1.8–2.4)
MCH RBC QN AUTO: 30.6 PG (ref 26–34)
MCHC RBC AUTO-ENTMCNC: 34.1 G/DL (ref 31–36)
MCV RBC AUTO: 89.7 FL (ref 80–100)
METAMYELOCYTES NFR BLD MANUAL: 1 %
MONOCYTES # BLD: 1.1 K/UL (ref 0–1.3)
MONOCYTES NFR BLD: 7 %
NEUTROPHILS # BLD: 12.2 K/UL (ref 1.7–7.7)
NEUTROPHILS NFR BLD: 79 %
NEUTS BAND NFR BLD MANUAL: 1 % (ref 0–7)
PHOSPHATE SERPL-MCNC: 3.5 MG/DL (ref 2.5–4.9)
PLATELET # BLD AUTO: 461 K/UL (ref 135–450)
PMV BLD AUTO: 6.9 FL (ref 5–10.5)
POTASSIUM SERPL-SCNC: 4.3 MMOL/L (ref 3.5–5.1)
RBC # BLD AUTO: 3.91 M/UL (ref 4–5.2)
RBC MORPH BLD: NORMAL
SODIUM SERPL-SCNC: 136 MMOL/L (ref 136–145)
TOXIC GRANULES BLD QL SMEAR: PRESENT
WBC # BLD AUTO: 15.1 K/UL (ref 4–11)

## 2025-05-30 PROCEDURE — APPNB15 APP NON BILLABLE TIME 0-15 MINS: Performed by: PHYSICIAN ASSISTANT

## 2025-05-30 PROCEDURE — 36415 COLL VENOUS BLD VENIPUNCTURE: CPT

## 2025-05-30 PROCEDURE — 6360000002 HC RX W HCPCS: Performed by: SURGERY

## 2025-05-30 PROCEDURE — 94760 N-INVAS EAR/PLS OXIMETRY 1: CPT

## 2025-05-30 PROCEDURE — 84100 ASSAY OF PHOSPHORUS: CPT

## 2025-05-30 PROCEDURE — 80048 BASIC METABOLIC PNL TOTAL CA: CPT

## 2025-05-30 PROCEDURE — 83735 ASSAY OF MAGNESIUM: CPT

## 2025-05-30 PROCEDURE — 2580000003 HC RX 258: Performed by: STUDENT IN AN ORGANIZED HEALTH CARE EDUCATION/TRAINING PROGRAM

## 2025-05-30 PROCEDURE — 6370000000 HC RX 637 (ALT 250 FOR IP): Performed by: SURGERY

## 2025-05-30 PROCEDURE — 83605 ASSAY OF LACTIC ACID: CPT

## 2025-05-30 PROCEDURE — 1200000000 HC SEMI PRIVATE

## 2025-05-30 PROCEDURE — 85025 COMPLETE CBC W/AUTO DIFF WBC: CPT

## 2025-05-30 PROCEDURE — 2580000003 HC RX 258: Performed by: SURGERY

## 2025-05-30 PROCEDURE — 99232 SBSQ HOSP IP/OBS MODERATE 35: CPT | Performed by: SURGERY

## 2025-05-30 RX ADMIN — SODIUM CHLORIDE: 0.9 INJECTION, SOLUTION INTRAVENOUS at 11:54

## 2025-05-30 RX ADMIN — PIPERACILLIN AND TAZOBACTAM 3375 MG: 3; .375 INJECTION, POWDER, LYOPHILIZED, FOR SOLUTION INTRAVENOUS at 04:28

## 2025-05-30 RX ADMIN — ACETAMINOPHEN 1000 MG: 500 TABLET ORAL at 14:07

## 2025-05-30 RX ADMIN — SODIUM CHLORIDE: 0.9 INJECTION, SOLUTION INTRAVENOUS at 04:29

## 2025-05-30 RX ADMIN — SENNOSIDES AND DOCUSATE SODIUM 1 TABLET: 8.6; 5 TABLET ORAL at 08:04

## 2025-05-30 RX ADMIN — FAMOTIDINE 20 MG: 20 TABLET, FILM COATED ORAL at 19:43

## 2025-05-30 RX ADMIN — FAMOTIDINE 20 MG: 20 TABLET, FILM COATED ORAL at 08:04

## 2025-05-30 RX ADMIN — ACETAMINOPHEN 1000 MG: 500 TABLET ORAL at 04:29

## 2025-05-30 RX ADMIN — OXYCODONE 5 MG: 5 TABLET ORAL at 19:54

## 2025-05-30 RX ADMIN — PIPERACILLIN AND TAZOBACTAM 3375 MG: 3; .375 INJECTION, POWDER, LYOPHILIZED, FOR SOLUTION INTRAVENOUS at 19:43

## 2025-05-30 RX ADMIN — PIPERACILLIN AND TAZOBACTAM 3375 MG: 3; .375 INJECTION, POWDER, LYOPHILIZED, FOR SOLUTION INTRAVENOUS at 11:54

## 2025-05-30 RX ADMIN — SENNOSIDES AND DOCUSATE SODIUM 1 TABLET: 8.6; 5 TABLET ORAL at 19:43

## 2025-05-30 RX ADMIN — OXYCODONE HYDROCHLORIDE 10 MG: 10 TABLET ORAL at 08:05

## 2025-05-30 RX ADMIN — ACETAMINOPHEN 1000 MG: 500 TABLET ORAL at 21:16

## 2025-05-30 RX ADMIN — ENOXAPARIN SODIUM 40 MG: 100 INJECTION SUBCUTANEOUS at 08:04

## 2025-05-30 ASSESSMENT — PAIN DESCRIPTION - PAIN TYPE
TYPE: SURGICAL PAIN

## 2025-05-30 ASSESSMENT — PAIN DESCRIPTION - ORIENTATION
ORIENTATION: MID;RIGHT
ORIENTATION: RIGHT
ORIENTATION: RIGHT

## 2025-05-30 ASSESSMENT — PAIN SCALES - GENERAL
PAINLEVEL_OUTOF10: 6
PAINLEVEL_OUTOF10: 7
PAINLEVEL_OUTOF10: 6
PAINLEVEL_OUTOF10: 4
PAINLEVEL_OUTOF10: 5

## 2025-05-30 ASSESSMENT — PAIN DESCRIPTION - FREQUENCY
FREQUENCY: CONTINUOUS
FREQUENCY: INTERMITTENT
FREQUENCY: CONTINUOUS

## 2025-05-30 ASSESSMENT — PAIN DESCRIPTION - DESCRIPTORS
DESCRIPTORS: ACHING;CRAMPING
DESCRIPTORS: BURNING
DESCRIPTORS: ACHING

## 2025-05-30 ASSESSMENT — PAIN DESCRIPTION - LOCATION
LOCATION: BACK;BUTTOCKS
LOCATION: ABDOMEN;BACK
LOCATION: BUTTOCKS
LOCATION: BUTTOCKS;BACK
LOCATION: ABDOMEN;BACK

## 2025-05-30 ASSESSMENT — PAIN - FUNCTIONAL ASSESSMENT
PAIN_FUNCTIONAL_ASSESSMENT: ACTIVITIES ARE NOT PREVENTED

## 2025-05-30 ASSESSMENT — PAIN DESCRIPTION - ONSET
ONSET: ON-GOING

## 2025-05-30 NOTE — CARE COORDINATION
Case Management Assessment  Initial Evaluation    Date/Time of Evaluation: 5/30/2025 3:01 PM  Assessment Completed by: CROW Calderón    If patient is discharged prior to next notation, then this note serves as note for discharge by case management.    Patient Name: Piper Walker                   YOB: 1960  Diagnosis: Pelvic abscess in female [N73.9]  Abscess of sigmoid colon due to diverticulitis [K57.20]                   Date / Time: 5/29/2025 10:57 AM    Patient Admission Status: Inpatient   Readmission Risk (Low < 19, Mod (19-27), High > 27): Readmission Risk Score: 15.1    Current PCP: Tata Loredo APRN - CNP  PCP verified by CM? Yes    Chart Reviewed: Yes      History Provided by: Patient  Patient Orientation: Alert and Oriented    Patient Cognition: Alert    Hospitalization in the last 30 days (Readmission):  Yes    If yes, Readmission Assessment in CM Navigator will be completed.    Advance Directives:      Code Status: Full Code   Patient's Primary Decision Maker is: Legal Next of Kin    Primary Decision Maker: JacobJesusita - Child - [phone]    Discharge Planning:    Patient lives with: Spouse/Significant Other Type of Home: House  Primary Care Giver: Self  Patient Support Systems include: Spouse/Significant Other, Children   Current Financial resources: None  Current community resources: Housing, Other (Comment) (insurance)  Current services prior to admission: None            Current DME:              Type of Home Care services:  None    ADLS  Prior functional level: Independent in ADLs/IADLs  Current functional level: Independent in ADLs/IADLs    PT AM-PAC:   /24  OT AM-PAC:   /24    Family can provide assistance at DC: No  Would you like Case Management to discuss the discharge plan with any other family members/significant others, and if so, who? No  Plans to Return to Present Housing: Yes  Other Identified Issues/Barriers to RETURNING to current housing: none  Potential

## 2025-05-30 NOTE — CARE COORDINATION
05/30/25 1439   Readmission Assessment   Number of Days since last admission? 8-30 days   Previous Disposition Other (comment)  (home with )   Who is being Interviewed Patient   What was the patient's/caregiver's perception as to why they think they needed to return back to the hospital? Did not agree to original recommended D/C plan   Did you visit your Primary Care Physician after you left the hospital, before you returned this time? No   Why weren't you able to visit your PCP? Did not have an appointment   Did you see a specialist, such as Cardiac, Pulmonary, Orthopedic Physician, etc. after you left the hospital? Yes  (Dr. Nunez)   Who advised the patient to return to the hospital? Physician  (direct admit from Dr. Nunez)   Does the patient report anything that got in the way of taking their medications? No   In our efforts to provide the best possible care to you and others like you, can you think of anything that we could have done to help you after you left the hospital the first time, so that you might not have needed to return so soon? Other (Comment)  (felt discharged too soon, needed a 2nd CT Scan)

## 2025-05-30 NOTE — PLAN OF CARE
Problem: Chronic Conditions and Co-morbidities  Goal: Patient's chronic conditions and co-morbidity symptoms are monitored and maintained or improved  5/30/2025 0951 by Rosie Mcginnis RN  Outcome: Progressing  5/30/2025 0142 by Kevin Steward RN  Flowsheets (Taken 5/30/2025 0142)  Care Plan - Patient's Chronic Conditions and Co-Morbidity Symptoms are Monitored and Maintained or Improved: Monitor and assess patient's chronic conditions and comorbid symptoms for stability, deterioration, or improvement     Problem: Discharge Planning  Goal: Discharge to home or other facility with appropriate resources  5/30/2025 0951 by Rosie Mcginnis RN  Outcome: Progressing  5/30/2025 0142 by Kevin Steward RN  Outcome: Progressing  Flowsheets (Taken 5/30/2025 0142)  Discharge to home or other facility with appropriate resources:   Identify discharge learning needs (meds, wound care, etc)   Identify barriers to discharge with patient and caregiver   Arrange for needed discharge resources and transportation as appropriate   Arrange for interpreters to assist at discharge as needed     Problem: Pain  Goal: Verbalizes/displays adequate comfort level or baseline comfort level  5/30/2025 0951 by Rosie Mcginnis RN  Outcome: Progressing  5/30/2025 0142 by Kevin Steward RN  Outcome: Progressing  Flowsheets (Taken 5/30/2025 0142)  Verbalizes/displays adequate comfort level or baseline comfort level:   Encourage patient to monitor pain and request assistance   Administer analgesics based on type and severity of pain and evaluate response   Assess pain using appropriate pain scale   Implement non-pharmacological measures as appropriate and evaluate response   Consider cultural and social influences on pain and pain management     Problem: ABCDS Injury Assessment  Goal: Absence of physical injury  Outcome: Progressing     Problem: Safety - Adult  Goal: Free from fall injury  5/30/2025 0951 by Rosie Mcginnis RN  Outcome:

## 2025-05-30 NOTE — PLAN OF CARE
Problem: Chronic Conditions and Co-morbidities  Goal: Patient's chronic conditions and co-morbidity symptoms are monitored and maintained or improved  Flowsheets (Taken 5/30/2025 0142)  Care Plan - Patient's Chronic Conditions and Co-Morbidity Symptoms are Monitored and Maintained or Improved: Monitor and assess patient's chronic conditions and comorbid symptoms for stability, deterioration, or improvement     Problem: Discharge Planning  Goal: Discharge to home or other facility with appropriate resources  Outcome: Progressing  Flowsheets (Taken 5/30/2025 0142)  Discharge to home or other facility with appropriate resources:   Identify discharge learning needs (meds, wound care, etc)   Identify barriers to discharge with patient and caregiver   Arrange for needed discharge resources and transportation as appropriate   Arrange for interpreters to assist at discharge as needed     Problem: Pain  Goal: Verbalizes/displays adequate comfort level or baseline comfort level  Outcome: Progressing  Flowsheets (Taken 5/30/2025 0142)  Verbalizes/displays adequate comfort level or baseline comfort level:   Encourage patient to monitor pain and request assistance   Administer analgesics based on type and severity of pain and evaluate response   Assess pain using appropriate pain scale   Implement non-pharmacological measures as appropriate and evaluate response   Consider cultural and social influences on pain and pain management     Problem: Safety - Adult  Goal: Free from fall injury  Outcome: Progressing  Flowsheets (Taken 5/30/2025 0142)  Free From Fall Injury: Instruct family/caregiver on patient safety

## 2025-05-30 NOTE — PROGRESS NOTES
Patient had ultrasound IV placed earlier due to difficult placement of IV. Patient has  mL/hr running per provider order. Patient has Zosyn antibiotic ordered per provider. Fluids and antibiotics are not compatible per pharmacy. Telephone call made to Dr. Prado. Fluids changed to Normal Saline. See orders. Electronically signed by Tammy Ragsdale RN on 5/29/2025 at 8:27 PM

## 2025-05-30 NOTE — PROGRESS NOTES
Pt Ao x4. VSS. Pt has 7/10 pain PRN pain medication given. Morning medications given whole, tolerated well. Morning assessment completed. Pt turns self but will be reminded to turn as needed. Pt has no questions or concerns. Standard safety measures in place.     Electronically signed by Rosie Mcginnis RN on 5/30/2025 at 9:52 AM

## 2025-05-30 NOTE — PROGRESS NOTES
General Surgery  Daily Progress Note    Pt Name: Piper Walker  Medical Record Number: [id number]  Date of Birth 1960   Today's Date: 5/30/2025    No chief complaint on file.      ASSESSMENT/PLAN  Sigmoid diverticulitis with pelvic abscess s/p perc drain - improved abdominal pain.  WBC down to 15.1.  Continue perc drain.  Await culture results.  Continue zosyn.  Continue regular diet and bowel regimen.  + BM.  Hopeful discharge Sunday/Monday once leukocytosis resolves and cultures return to guide antibiotics.  Plan to repeat CT next week to re-evaluate abscess collection prior to drain removal.  COPD - continue albuterol inhaler       SUBJECTIVE  Piper has improved from yesterday. Pain is well controlled. She has no nausea and no vomiting.  She has passed flatus and has had a bowel movement. She is tolerating regular diet. Current activity is ad cathy    OBJECTIVE  VITALS:  height is 1.651 m (5' 5\") and weight is 53.2 kg (117 lb 4.6 oz). Her oral temperature is 99 °F (37.2 °C). Her blood pressure is 120/65 and her pulse is 64. Her respiration is 16 and oxygen saturation is 98%.   GENERAL: alert, no distress  LUNGS: normal respiratory effort, no accessory muscle use  HEART: normal rate and regular rhythm  ABDOMEN: soft, NT, ND, right buttock perc drain with dark purulent fluid  EXTREMITY: no cyanosis and no clubbing  I/O last 3 completed shifts:  In: -   Out: 35 [Drains:35]  No intake/output data recorded.    LABS  Recent Labs     05/29/25  0718 05/30/25  0438   WBC 21.8* 15.1*   HGB 12.1 11.9*   HCT 35.6* 35.0*   * 461*    136   K 4.4 4.3    104   CO2 22 23   BUN 15 11   CREATININE 0.6 0.6   MG  --  1.85   PHOS  --  3.5   CALCIUM 9.0 8.5   INR 1.25*  --    AST 24  --    ALT 7*  --    BILITOT <0.2  --    CBC with Differential:    Lab Results   Component Value Date/Time    WBC 15.1 05/30/2025 04:38 AM    RBC 3.91 05/30/2025 04:38 AM    HGB 11.9 05/30/2025 04:38 AM    HCT 35.0 05/30/2025

## 2025-05-30 NOTE — ACP (ADVANCE CARE PLANNING)
Advance Care Planning   Healthcare Decision Maker:    Primary Decision Maker: Jesusita James - Jose - [phone]      Today we documented Decision Maker(s) consistent with Legal Next of Kin hierarchy.       Electronically signed by CROW Calderón on 5/30/2025 at 2:56 PM

## 2025-05-30 NOTE — PROGRESS NOTES
Comprehensive Nutrition Assessment    Type and Reason for Visit:  Positive nutrition screen    Nutrition Recommendations/Plan:   Continue Regular diet  Begin Ensure high roxanne/high pro bid  Monitor pertinent labs, bowel habits, weight, N/V, clinical progression.      Malnutrition Assessment:  Malnutrition Status:  At risk for malnutrition (05/30/25 1433)    Context:  Acute Illness     Findings of the 6 clinical characteristics of malnutrition:  Energy Intake:  75% or less of estimated energy requirements for 7 or more days  Weight Loss:  No weight loss     Body Fat Loss:  Unable to assess (unable to see pt; defer NFPE to follow-up)     Muscle Mass Loss:  Unable to assess    Fluid Accumulation:  No fluid accumulation     Strength:  Not Performed    Nutrition Assessment:    Positive nutrition screen for poor appetite and weight loss. Pt with recent hospital admission for sigmoid diverticulitis w/ pelvic abcess and s/p perc drain. Admitted for pain control and IV ATB. Pt tolerating a Regular diet per MD. Noted meal intake at 26-50% x1 meal(limited data due to new admit). EMR showing no recent weight loss, however, limited data available. BMI of 19 at low end of desirable range. Nutrition status compromised due to reports of poor appetite pta and meal intake of 26-50%. Will add Ensure high roxanne/high pro bid to promote adequate nutrition. Will monitor intake adequacy and acceptance of supplements.    Nutrition Related Findings:    No edema. LBM5/29. Glu 117. Wound Type: None       Current Nutrition Intake & Therapies:    Average Meal Intake: 26-50% (minimal data due to new admit)  Average Supplements Intake: None Ordered  ADULT DIET; Regular    Anthropometric Measures:  Height: 165.1 cm (5' 5\")  Ideal Body Weight (IBW): 125 lbs (57 kg)       Current Body Weight: 53.2 kg , (117 bl)  IBW. 125 lbs   Current BMI (kg/m2): 19.52         Estimated Daily Nutrient Needs:  Energy Requirements Based On: Kcal/kg  Weight Used for

## 2025-05-31 LAB
ANION GAP SERPL CALCULATED.3IONS-SCNC: 14 MMOL/L (ref 3–16)
BASOPHILS # BLD: 0.1 K/UL (ref 0–0.2)
BASOPHILS NFR BLD: 1.2 %
BUN SERPL-MCNC: 7 MG/DL (ref 7–20)
CALCIUM SERPL-MCNC: 8 MG/DL (ref 8.3–10.6)
CHLORIDE SERPL-SCNC: 104 MMOL/L (ref 99–110)
CO2 SERPL-SCNC: 17 MMOL/L (ref 21–32)
CREAT SERPL-MCNC: 0.6 MG/DL (ref 0.6–1.2)
DEPRECATED RDW RBC AUTO: 13.6 % (ref 12.4–15.4)
EOSINOPHIL # BLD: 0.3 K/UL (ref 0–0.6)
EOSINOPHIL NFR BLD: 2.5 %
GFR SERPLBLD CREATININE-BSD FMLA CKD-EPI: >90 ML/MIN/{1.73_M2}
GLUCOSE SERPL-MCNC: 82 MG/DL (ref 70–99)
HCT VFR BLD AUTO: 35.3 % (ref 36–48)
HGB BLD-MCNC: 11.9 G/DL (ref 12–16)
LYMPHOCYTES # BLD: 1.9 K/UL (ref 1–5.1)
LYMPHOCYTES NFR BLD: 17.5 %
MAGNESIUM SERPL-MCNC: 1.82 MG/DL (ref 1.8–2.4)
MCH RBC QN AUTO: 30.4 PG (ref 26–34)
MCHC RBC AUTO-ENTMCNC: 33.7 G/DL (ref 31–36)
MCV RBC AUTO: 90.2 FL (ref 80–100)
MONOCYTES # BLD: 1 K/UL (ref 0–1.3)
MONOCYTES NFR BLD: 9.6 %
NEUTROPHILS # BLD: 7.3 K/UL (ref 1.7–7.7)
NEUTROPHILS NFR BLD: 69.2 %
PLATELET # BLD AUTO: 500 K/UL (ref 135–450)
PMV BLD AUTO: 6.8 FL (ref 5–10.5)
POTASSIUM SERPL-SCNC: 3.3 MMOL/L (ref 3.5–5.1)
RBC # BLD AUTO: 3.91 M/UL (ref 4–5.2)
SODIUM SERPL-SCNC: 135 MMOL/L (ref 136–145)
WBC # BLD AUTO: 10.6 K/UL (ref 4–11)

## 2025-05-31 PROCEDURE — 1200000000 HC SEMI PRIVATE

## 2025-05-31 PROCEDURE — 2500000003 HC RX 250 WO HCPCS: Performed by: SURGERY

## 2025-05-31 PROCEDURE — 36415 COLL VENOUS BLD VENIPUNCTURE: CPT

## 2025-05-31 PROCEDURE — 80048 BASIC METABOLIC PNL TOTAL CA: CPT

## 2025-05-31 PROCEDURE — 94760 N-INVAS EAR/PLS OXIMETRY 1: CPT

## 2025-05-31 PROCEDURE — 85025 COMPLETE CBC W/AUTO DIFF WBC: CPT

## 2025-05-31 PROCEDURE — 99231 SBSQ HOSP IP/OBS SF/LOW 25: CPT | Performed by: SURGERY

## 2025-05-31 PROCEDURE — 83735 ASSAY OF MAGNESIUM: CPT

## 2025-05-31 PROCEDURE — 2580000003 HC RX 258: Performed by: STUDENT IN AN ORGANIZED HEALTH CARE EDUCATION/TRAINING PROGRAM

## 2025-05-31 PROCEDURE — 6360000002 HC RX W HCPCS: Performed by: SURGERY

## 2025-05-31 PROCEDURE — 6370000000 HC RX 637 (ALT 250 FOR IP): Performed by: SURGERY

## 2025-05-31 PROCEDURE — 2580000003 HC RX 258: Performed by: SURGERY

## 2025-05-31 RX ADMIN — PIPERACILLIN AND TAZOBACTAM 3375 MG: 3; .375 INJECTION, POWDER, LYOPHILIZED, FOR SOLUTION INTRAVENOUS at 11:35

## 2025-05-31 RX ADMIN — OXYCODONE HYDROCHLORIDE 10 MG: 10 TABLET ORAL at 08:35

## 2025-05-31 RX ADMIN — POTASSIUM CHLORIDE 40 MEQ: 1500 TABLET, EXTENDED RELEASE ORAL at 11:33

## 2025-05-31 RX ADMIN — SODIUM CHLORIDE: 0.9 INJECTION, SOLUTION INTRAVENOUS at 04:23

## 2025-05-31 RX ADMIN — SENNOSIDES AND DOCUSATE SODIUM 1 TABLET: 8.6; 5 TABLET ORAL at 08:34

## 2025-05-31 RX ADMIN — SODIUM CHLORIDE: 0.9 INJECTION, SOLUTION INTRAVENOUS at 12:26

## 2025-05-31 RX ADMIN — FAMOTIDINE 20 MG: 20 TABLET, FILM COATED ORAL at 08:34

## 2025-05-31 RX ADMIN — SENNOSIDES AND DOCUSATE SODIUM 1 TABLET: 8.6; 5 TABLET ORAL at 20:06

## 2025-05-31 RX ADMIN — ACETAMINOPHEN 1000 MG: 500 TABLET ORAL at 13:57

## 2025-05-31 RX ADMIN — PIPERACILLIN AND TAZOBACTAM 3375 MG: 3; .375 INJECTION, POWDER, LYOPHILIZED, FOR SOLUTION INTRAVENOUS at 20:06

## 2025-05-31 RX ADMIN — ACETAMINOPHEN 1000 MG: 500 TABLET ORAL at 04:23

## 2025-05-31 RX ADMIN — SODIUM CHLORIDE: 0.9 INJECTION, SOLUTION INTRAVENOUS at 20:27

## 2025-05-31 RX ADMIN — MORPHINE SULFATE 4 MG: 4 INJECTION, SOLUTION INTRAMUSCULAR; INTRAVENOUS at 19:56

## 2025-05-31 RX ADMIN — OXYCODONE HYDROCHLORIDE 10 MG: 10 TABLET ORAL at 04:26

## 2025-05-31 RX ADMIN — PIPERACILLIN AND TAZOBACTAM 3375 MG: 3; .375 INJECTION, POWDER, LYOPHILIZED, FOR SOLUTION INTRAVENOUS at 04:23

## 2025-05-31 RX ADMIN — ENOXAPARIN SODIUM 40 MG: 100 INJECTION SUBCUTANEOUS at 08:35

## 2025-05-31 RX ADMIN — FAMOTIDINE 20 MG: 20 TABLET, FILM COATED ORAL at 20:06

## 2025-05-31 RX ADMIN — SODIUM CHLORIDE, PRESERVATIVE FREE 10 ML: 5 INJECTION INTRAVENOUS at 20:08

## 2025-05-31 RX ADMIN — ACETAMINOPHEN 1000 MG: 500 TABLET ORAL at 23:00

## 2025-05-31 ASSESSMENT — PAIN DESCRIPTION - ONSET
ONSET: ON-GOING
ONSET: ON-GOING

## 2025-05-31 ASSESSMENT — PAIN DESCRIPTION - FREQUENCY
FREQUENCY: INTERMITTENT
FREQUENCY: INTERMITTENT

## 2025-05-31 ASSESSMENT — PAIN - FUNCTIONAL ASSESSMENT
PAIN_FUNCTIONAL_ASSESSMENT: ACTIVITIES ARE NOT PREVENTED

## 2025-05-31 ASSESSMENT — PAIN DESCRIPTION - ORIENTATION
ORIENTATION: MID
ORIENTATION: LOWER;MID

## 2025-05-31 ASSESSMENT — PAIN SCALES - GENERAL
PAINLEVEL_OUTOF10: 7
PAINLEVEL_OUTOF10: 5
PAINLEVEL_OUTOF10: 8
PAINLEVEL_OUTOF10: 7

## 2025-05-31 ASSESSMENT — PAIN DESCRIPTION - LOCATION
LOCATION: LEG;HIP
LOCATION: HEAD;BACK
LOCATION: ABDOMEN;BACK
LOCATION: BUTTOCKS

## 2025-05-31 ASSESSMENT — PAIN DESCRIPTION - DESCRIPTORS
DESCRIPTORS: ACHING
DESCRIPTORS: THROBBING
DESCRIPTORS: ACHING
DESCRIPTORS: ACHING

## 2025-05-31 ASSESSMENT — PAIN DESCRIPTION - PAIN TYPE
TYPE: SURGICAL PAIN;CHRONIC PAIN
TYPE: SURGICAL PAIN

## 2025-05-31 NOTE — PLAN OF CARE
Problem: Chronic Conditions and Co-morbidities  Goal: Patient's chronic conditions and co-morbidity symptoms are monitored and maintained or improved  5/31/2025 0928 by Rosie Mcginnis RN  Outcome: Progressing  5/31/2025 0928 by Rosie Mcginnis RN  Outcome: Progressing     Problem: Discharge Planning  Goal: Discharge to home or other facility with appropriate resources  5/31/2025 0928 by Rosie Mcginnis RN  Outcome: Progressing  5/31/2025 0928 by Rosie Mcginnis RN  Outcome: Progressing  5/31/2025 0134 by Kevin Steward RN  Outcome: Progressing  Flowsheets (Taken 5/31/2025 0134)  Discharge to home or other facility with appropriate resources:   Identify barriers to discharge with patient and caregiver   Identify discharge learning needs (meds, wound care, etc)   Arrange for needed discharge resources and transportation as appropriate   Arrange for interpreters to assist at discharge as needed     Problem: Pain  Goal: Verbalizes/displays adequate comfort level or baseline comfort level  5/31/2025 0928 by Rosie Mcginnis RN  Outcome: Progressing  5/31/2025 0928 by Rosie Mcginnis RN  Outcome: Progressing  5/31/2025 0134 by Kevin Steward RN  Outcome: Progressing  Flowsheets (Taken 5/31/2025 0134)  Verbalizes/displays adequate comfort level or baseline comfort level:   Assess pain using appropriate pain scale   Administer analgesics based on type and severity of pain and evaluate response   Encourage patient to monitor pain and request assistance   Implement non-pharmacological measures as appropriate and evaluate response   Consider cultural and social influences on pain and pain management     Problem: ABCDS Injury Assessment  Goal: Absence of physical injury  5/31/2025 0928 by Rosie Mcginnis RN  Outcome: Progressing  5/31/2025 0928 by Rosie Mcginnis RN  Outcome: Progressing     Problem: Safety - Adult  Goal: Free from fall injury  5/31/2025 0928 by Rosie Mcginnis RN  Outcome:

## 2025-05-31 NOTE — PROGRESS NOTES
Potassium 3.3 replaced orally per protocol.    Electronically signed by Rosie Mcginnis RN on 5/31/2025 at 11:40 AM

## 2025-05-31 NOTE — PROGRESS NOTES
Pt Ao x4. VSS. Pt has 8/10 pain, PRN pain medication given. Morning medications given whole, tolerated well. Morning assessment completed. Pt turns self, and UAL. Pt ambulates frequently. Positive for bowel movement since being here. Pt has no questions or concerns. Bed in lowest and locked position, with call light in reach.    Electronically signed by Rosie Mcginnis RN on 5/31/2025 at 9:30 AM

## 2025-05-31 NOTE — PLAN OF CARE
Problem: Discharge Planning  Goal: Discharge to home or other facility with appropriate resources  Outcome: Progressing  Flowsheets (Taken 5/31/2025 0134)  Discharge to home or other facility with appropriate resources:   Identify barriers to discharge with patient and caregiver   Identify discharge learning needs (meds, wound care, etc)   Arrange for needed discharge resources and transportation as appropriate   Arrange for interpreters to assist at discharge as needed     Problem: Pain  Goal: Verbalizes/displays adequate comfort level or baseline comfort level  Outcome: Progressing  Flowsheets (Taken 5/31/2025 0134)  Verbalizes/displays adequate comfort level or baseline comfort level:   Assess pain using appropriate pain scale   Administer analgesics based on type and severity of pain and evaluate response   Encourage patient to monitor pain and request assistance   Implement non-pharmacological measures as appropriate and evaluate response   Consider cultural and social influences on pain and pain management     Problem: Safety - Adult  Goal: Free from fall injury  Outcome: Progressing     Problem: Nutrition Deficit:  Goal: Optimize nutritional status  Outcome: Progressing  Flowsheets (Taken 5/31/2025 0134)  Nutrient intake appropriate for improving, restoring, or maintaining nutritional needs:   Assess nutritional status and recommend course of action   Recommend appropriate diets, oral nutritional supplements, and vitamin/mineral supplements   Recommend, monitor, and adjust tube feedings and TPN/PPN based on assessed needs   Monitor oral intake, labs, and treatment plans

## 2025-05-31 NOTE — PROGRESS NOTES
Progress Note  Date:2025       Room:C1C-8034/4256-01  Patient Name:Piper Walker     YOB: 1960     Age:65 y.o.        Subjective    Subjective:  Symptoms:  Improved.    Diet:  Adequate intake.  No nausea or vomiting.    Activity level: Returning to normal.    Pain:  She complains of pain that is mild.       Review of Systems   Gastrointestinal:  Negative for nausea and vomiting.     Objective         Vitals Last 24 Hours:  TEMPERATURE:  Temp  Av °F (36.7 °C)  Min: 97.9 °F (36.6 °C)  Max: 98.1 °F (36.7 °C)  RESPIRATIONS RANGE: Resp  Av  Min: 18  Max: 18  PULSE OXIMETRY RANGE: SpO2  Av.7 %  Min: 97 %  Max: 98 %  PULSE RANGE: Pulse  Av  Min: 70  Max: 76  BLOOD PRESSURE RANGE: Systolic (24hrs), Av , Min:127 , Max:140   ; Diastolic (24hrs), Av, Min:51, Max:80    I/O (24Hr):    Intake/Output Summary (Last 24 hours) at 2025 0933  Last data filed at 2025 0618  Gross per 24 hour   Intake 1100 ml   Output 10 ml   Net 1090 ml     Objective  Labs/Imaging/Diagnostics    Labs:  CBC:  Recent Labs     25  07   WBC 21.8* 15.1* 10.6   RBC 3.98* 3.91* 3.91*   HGB 12.1 11.9* 11.9*   HCT 35.6* 35.0* 35.3*   MCV 89.3 89.7 90.2   RDW 13.5 14.0 13.6   * 461* 500*     CHEMISTRIES:  Recent Labs     258 25  07    136 135*   K 4.4 4.3 3.3*    104 104   CO2 22 23 17*   BUN 15 11 7   CREATININE 0.6 0.6 0.6   GLUCOSE 130* 117* 82   PHOS  --  3.5  --    MG  --  1.85 1.82     PT/INR:  Recent Labs     05/29/25  0718   PROTIME 15.9*   INR 1.25*     APTT:No results for input(s): \"APTT\" in the last 72 hours.  LIVER PROFILE:  Recent Labs     25  0718   AST 24   ALT 7*   BILITOT <0.2   ALKPHOS 81       Imaging Last 24 Hours:  No results found.  Assessment//Plan           Hospital Problems           Last Modified POA    * (Principal) Abscess of sigmoid colon due to diverticulitis 2025 Yes

## 2025-06-01 LAB
BACTERIA SPEC AEROBE CULT: ABNORMAL
BACTERIA SPEC AEROBE CULT: ABNORMAL
BACTERIA SPEC ANAEROBE CULT: ABNORMAL
GRAM STN SPEC: ABNORMAL
ORGANISM: ABNORMAL

## 2025-06-01 PROCEDURE — 1200000000 HC SEMI PRIVATE

## 2025-06-01 PROCEDURE — 94760 N-INVAS EAR/PLS OXIMETRY 1: CPT

## 2025-06-01 PROCEDURE — 6370000000 HC RX 637 (ALT 250 FOR IP): Performed by: SURGERY

## 2025-06-01 PROCEDURE — 6360000002 HC RX W HCPCS: Performed by: SURGERY

## 2025-06-01 PROCEDURE — 2500000003 HC RX 250 WO HCPCS: Performed by: SURGERY

## 2025-06-01 PROCEDURE — 6370000000 HC RX 637 (ALT 250 FOR IP)

## 2025-06-01 PROCEDURE — 99231 SBSQ HOSP IP/OBS SF/LOW 25: CPT | Performed by: SURGERY

## 2025-06-01 PROCEDURE — 2580000003 HC RX 258: Performed by: SURGERY

## 2025-06-01 PROCEDURE — 2580000003 HC RX 258: Performed by: STUDENT IN AN ORGANIZED HEALTH CARE EDUCATION/TRAINING PROGRAM

## 2025-06-01 RX ADMIN — SENNOSIDES AND DOCUSATE SODIUM 1 TABLET: 8.6; 5 TABLET ORAL at 07:57

## 2025-06-01 RX ADMIN — FAMOTIDINE 20 MG: 20 TABLET, FILM COATED ORAL at 21:24

## 2025-06-01 RX ADMIN — PIPERACILLIN AND TAZOBACTAM 3375 MG: 3; .375 INJECTION, POWDER, LYOPHILIZED, FOR SOLUTION INTRAVENOUS at 21:35

## 2025-06-01 RX ADMIN — ACETAMINOPHEN 1000 MG: 500 TABLET ORAL at 13:55

## 2025-06-01 RX ADMIN — ENOXAPARIN SODIUM 40 MG: 100 INJECTION SUBCUTANEOUS at 07:57

## 2025-06-01 RX ADMIN — MORPHINE SULFATE 4 MG: 4 INJECTION, SOLUTION INTRAMUSCULAR; INTRAVENOUS at 21:27

## 2025-06-01 RX ADMIN — PIPERACILLIN AND TAZOBACTAM 3375 MG: 3; .375 INJECTION, POWDER, LYOPHILIZED, FOR SOLUTION INTRAVENOUS at 04:25

## 2025-06-01 RX ADMIN — SODIUM CHLORIDE: 0.9 INJECTION, SOLUTION INTRAVENOUS at 04:32

## 2025-06-01 RX ADMIN — FAMOTIDINE 20 MG: 20 TABLET, FILM COATED ORAL at 07:57

## 2025-06-01 RX ADMIN — ACETAMINOPHEN 1000 MG: 500 TABLET ORAL at 05:02

## 2025-06-01 RX ADMIN — SODIUM CHLORIDE, PRESERVATIVE FREE 10 ML: 5 INJECTION INTRAVENOUS at 21:29

## 2025-06-01 RX ADMIN — Medication 3 MG: at 01:46

## 2025-06-01 RX ADMIN — MORPHINE SULFATE 4 MG: 4 INJECTION, SOLUTION INTRAMUSCULAR; INTRAVENOUS at 04:28

## 2025-06-01 RX ADMIN — SENNOSIDES AND DOCUSATE SODIUM 1 TABLET: 8.6; 5 TABLET ORAL at 21:24

## 2025-06-01 RX ADMIN — OXYCODONE 5 MG: 5 TABLET ORAL at 07:57

## 2025-06-01 RX ADMIN — SODIUM CHLORIDE: 0.9 INJECTION, SOLUTION INTRAVENOUS at 12:39

## 2025-06-01 RX ADMIN — ACETAMINOPHEN 1000 MG: 500 TABLET ORAL at 21:24

## 2025-06-01 RX ADMIN — SODIUM CHLORIDE: 0.9 INJECTION, SOLUTION INTRAVENOUS at 21:30

## 2025-06-01 RX ADMIN — PIPERACILLIN AND TAZOBACTAM 3375 MG: 3; .375 INJECTION, POWDER, LYOPHILIZED, FOR SOLUTION INTRAVENOUS at 11:48

## 2025-06-01 ASSESSMENT — PAIN DESCRIPTION - ONSET
ONSET: ON-GOING
ONSET: ON-GOING

## 2025-06-01 ASSESSMENT — PAIN DESCRIPTION - DESCRIPTORS
DESCRIPTORS: ACHING

## 2025-06-01 ASSESSMENT — PAIN DESCRIPTION - PAIN TYPE
TYPE: SURGICAL PAIN
TYPE: SURGICAL PAIN

## 2025-06-01 ASSESSMENT — PAIN SCALES - GENERAL
PAINLEVEL_OUTOF10: 4
PAINLEVEL_OUTOF10: 7
PAINLEVEL_OUTOF10: 6
PAINLEVEL_OUTOF10: 8

## 2025-06-01 ASSESSMENT — PAIN DESCRIPTION - LOCATION
LOCATION: LEG
LOCATION: LEG
LOCATION: BACK
LOCATION: BACK;BUTTOCKS

## 2025-06-01 ASSESSMENT — PAIN DESCRIPTION - FREQUENCY
FREQUENCY: CONTINUOUS
FREQUENCY: CONTINUOUS

## 2025-06-01 ASSESSMENT — PAIN DESCRIPTION - ORIENTATION
ORIENTATION: LOWER
ORIENTATION: RIGHT

## 2025-06-01 ASSESSMENT — PAIN - FUNCTIONAL ASSESSMENT
PAIN_FUNCTIONAL_ASSESSMENT: ACTIVITIES ARE NOT PREVENTED
PAIN_FUNCTIONAL_ASSESSMENT: ACTIVITIES ARE NOT PREVENTED

## 2025-06-01 NOTE — PLAN OF CARE
Problem: Chronic Conditions and Co-morbidities  Goal: Patient's chronic conditions and co-morbidity symptoms are monitored and maintained or improved  6/1/2025 0957 by Rosie Mcginnis RN  Outcome: Progressing  5/31/2025 2333 by Jazmin Foote RN  Outcome: Progressing     Problem: Discharge Planning  Goal: Discharge to home or other facility with appropriate resources  6/1/2025 0957 by Rosie Mcginnis RN  Outcome: Progressing  5/31/2025 2333 by Jazmin Foote RN  Outcome: Progressing     Problem: Pain  Goal: Verbalizes/displays adequate comfort level or baseline comfort level  6/1/2025 0957 by Rosie Mcginnis RN  Outcome: Progressing  5/31/2025 2333 by Jazmin Foote RN  Outcome: Progressing     Problem: ABCDS Injury Assessment  Goal: Absence of physical injury  6/1/2025 0957 by Rosie Mcginnis RN  Outcome: Progressing  5/31/2025 2333 by Jazmin Foote RN  Outcome: Progressing     Problem: Safety - Adult  Goal: Free from fall injury  6/1/2025 0957 by Rosie Mcginnis RN  Outcome: Progressing  5/31/2025 2333 by Jazmin Foote RN  Outcome: Progressing     Problem: Nutrition Deficit:  Goal: Optimize nutritional status  6/1/2025 0957 by Rosie Mcginnis RN  Outcome: Progressing  5/31/2025 2333 by Jazmin Foote RN  Outcome: Progressing

## 2025-06-01 NOTE — PLAN OF CARE
Problem: Chronic Conditions and Co-morbidities  Goal: Patient's chronic conditions and co-morbidity symptoms are monitored and maintained or improved  Outcome: Progressing     Problem: Discharge Planning  Goal: Discharge to home or other facility with appropriate resources  Outcome: Progressing     Problem: Pain  Goal: Verbalizes/displays adequate comfort level or baseline comfort level  Outcome: Progressing     Problem: ABCDS Injury Assessment  Goal: Absence of physical injury  Outcome: Progressing     Problem: Safety - Adult  Goal: Free from fall injury  Outcome: Progressing     Problem: Nutrition Deficit:  Goal: Optimize nutritional status  Outcome: Progressing

## 2025-06-01 NOTE — PROGRESS NOTES
Progress Note  Date:2025       Room:U4H-5982/4256-01  Patient Name:Piper Walker     YOB: 1960     Age:65 y.o.        Subjective    Subjective:  Symptoms:  Stable.    Diet:  Adequate intake.  No nausea.    Activity level: Returning to normal.    Pain:  She complains of pain that is mild.       Review of Systems   Gastrointestinal:  Negative for nausea.     Objective         Vitals Last 24 Hours:  TEMPERATURE:  Temp  Av °F (36.7 °C)  Min: 97.9 °F (36.6 °C)  Max: 98.1 °F (36.7 °C)  RESPIRATIONS RANGE: Resp  Av  Min: 17  Max: 17  PULSE OXIMETRY RANGE: SpO2  Av.5 %  Min: 96 %  Max: 97 %  PULSE RANGE: Pulse  Av  Min: 62  Max: 68  BLOOD PRESSURE RANGE: Systolic (24hrs), Av , Min:132 , Max:139   ; Diastolic (24hrs), Av, Min:79, Max:82    I/O (24Hr):    Intake/Output Summary (Last 24 hours) at 2025 0940  Last data filed at 2025 0556  Gross per 24 hour   Intake 360 ml   Output 7.5 ml   Net 352.5 ml     Objective  Labs/Imaging/Diagnostics    Labs:  CBC:  Recent Labs     25  0438 25  0719   WBC 15.1* 10.6   RBC 3.91* 3.91*   HGB 11.9* 11.9*   HCT 35.0* 35.3*   MCV 89.7 90.2   RDW 14.0 13.6   * 500*     CHEMISTRIES:  Recent Labs     25  0438 25  0719    135*   K 4.3 3.3*    104   CO2 23 17*   BUN 11 7   CREATININE 0.6 0.6   GLUCOSE 117* 82   PHOS 3.5  --    MG 1.85 1.82     PT/INR:No results for input(s): \"PROTIME\", \"INR\" in the last 72 hours.  APTT:No results for input(s): \"APTT\" in the last 72 hours.  LIVER PROFILE:No results for input(s): \"AST\", \"ALT\", \"BILIDIR\", \"BILITOT\", \"ALKPHOS\" in the last 72 hours.    Imaging Last 24 Hours:  No results found.  Assessment//Plan           Hospital Problems           Last Modified POA    * (Principal) Abscess of sigmoid colon due to diverticulitis 2025 Yes    Chronic obstructive pulmonary disease (HCC) 2025 Yes     Assessment & Plan    Stable overall   Pain mostly at drain

## 2025-06-01 NOTE — PROGRESS NOTES
Pt Ao x4. VSS. Pt has 6/10 pain PRN medication given. Morning medications given whole, tolerated well. Morning assessment completed. Pt turns self in bed but is reminded as needed. Pt has no questions or concerns. Bed in lowest and locked position with call light in reach.     Electronically signed by Rosie Mcginnis RN on 6/1/2025 at 9:59 AM

## 2025-06-02 ENCOUNTER — APPOINTMENT (OUTPATIENT)
Dept: CT IMAGING | Age: 65
DRG: 392 | End: 2025-06-02
Attending: SURGERY

## 2025-06-02 VITALS
HEART RATE: 73 BPM | DIASTOLIC BLOOD PRESSURE: 81 MMHG | WEIGHT: 119.27 LBS | BODY MASS INDEX: 19.87 KG/M2 | OXYGEN SATURATION: 98 % | TEMPERATURE: 97.9 F | SYSTOLIC BLOOD PRESSURE: 132 MMHG | RESPIRATION RATE: 18 BRPM | HEIGHT: 65 IN

## 2025-06-02 LAB
ANION GAP SERPL CALCULATED.3IONS-SCNC: 9 MMOL/L (ref 3–16)
BASOPHILS # BLD: 0.1 K/UL (ref 0–0.2)
BASOPHILS NFR BLD: 0.8 %
BUN SERPL-MCNC: 6 MG/DL (ref 7–20)
CALCIUM SERPL-MCNC: 8.7 MG/DL (ref 8.3–10.6)
CHLORIDE SERPL-SCNC: 103 MMOL/L (ref 99–110)
CO2 SERPL-SCNC: 25 MMOL/L (ref 21–32)
CREAT SERPL-MCNC: 0.6 MG/DL (ref 0.6–1.2)
DEPRECATED RDW RBC AUTO: 14 % (ref 12.4–15.4)
EOSINOPHIL # BLD: 0.5 K/UL (ref 0–0.6)
EOSINOPHIL NFR BLD: 5.1 %
GFR SERPLBLD CREATININE-BSD FMLA CKD-EPI: >90 ML/MIN/{1.73_M2}
GLUCOSE SERPL-MCNC: 89 MG/DL (ref 70–99)
HCT VFR BLD AUTO: 35.5 % (ref 36–48)
HGB BLD-MCNC: 12.2 G/DL (ref 12–16)
LYMPHOCYTES # BLD: 2.2 K/UL (ref 1–5.1)
LYMPHOCYTES NFR BLD: 22.1 %
MAGNESIUM SERPL-MCNC: 1.67 MG/DL (ref 1.8–2.4)
MCH RBC QN AUTO: 30.4 PG (ref 26–34)
MCHC RBC AUTO-ENTMCNC: 34.5 G/DL (ref 31–36)
MCV RBC AUTO: 88.1 FL (ref 80–100)
MONOCYTES # BLD: 1.2 K/UL (ref 0–1.3)
MONOCYTES NFR BLD: 12.3 %
NEUTROPHILS # BLD: 5.9 K/UL (ref 1.7–7.7)
NEUTROPHILS NFR BLD: 59.7 %
PLATELET # BLD AUTO: 527 K/UL (ref 135–450)
PMV BLD AUTO: 6.6 FL (ref 5–10.5)
POTASSIUM SERPL-SCNC: 3.8 MMOL/L (ref 3.5–5.1)
RBC # BLD AUTO: 4.02 M/UL (ref 4–5.2)
SODIUM SERPL-SCNC: 137 MMOL/L (ref 136–145)
WBC # BLD AUTO: 9.8 K/UL (ref 4–11)

## 2025-06-02 PROCEDURE — 6370000000 HC RX 637 (ALT 250 FOR IP): Performed by: SURGERY

## 2025-06-02 PROCEDURE — 2580000003 HC RX 258: Performed by: STUDENT IN AN ORGANIZED HEALTH CARE EDUCATION/TRAINING PROGRAM

## 2025-06-02 PROCEDURE — 80048 BASIC METABOLIC PNL TOTAL CA: CPT

## 2025-06-02 PROCEDURE — 6360000002 HC RX W HCPCS: Performed by: SURGERY

## 2025-06-02 PROCEDURE — 36415 COLL VENOUS BLD VENIPUNCTURE: CPT

## 2025-06-02 PROCEDURE — 83735 ASSAY OF MAGNESIUM: CPT

## 2025-06-02 PROCEDURE — 94760 N-INVAS EAR/PLS OXIMETRY 1: CPT

## 2025-06-02 PROCEDURE — 2580000003 HC RX 258: Performed by: SURGERY

## 2025-06-02 PROCEDURE — 85025 COMPLETE CBC W/AUTO DIFF WBC: CPT

## 2025-06-02 PROCEDURE — 74177 CT ABD & PELVIS W/CONTRAST: CPT

## 2025-06-02 PROCEDURE — 99232 SBSQ HOSP IP/OBS MODERATE 35: CPT | Performed by: SURGERY

## 2025-06-02 PROCEDURE — 6360000004 HC RX CONTRAST MEDICATION: Performed by: SURGERY

## 2025-06-02 RX ORDER — OXYCODONE HYDROCHLORIDE 5 MG/1
5 TABLET ORAL EVERY 6 HOURS PRN
Qty: 15 TABLET | Refills: 0 | Status: SHIPPED | OUTPATIENT
Start: 2025-06-02 | End: 2025-06-09

## 2025-06-02 RX ORDER — IOPAMIDOL 755 MG/ML
75 INJECTION, SOLUTION INTRAVASCULAR
Status: COMPLETED | OUTPATIENT
Start: 2025-06-02 | End: 2025-06-02

## 2025-06-02 RX ADMIN — SODIUM CHLORIDE: 0.9 INJECTION, SOLUTION INTRAVENOUS at 06:18

## 2025-06-02 RX ADMIN — PIPERACILLIN AND TAZOBACTAM 3375 MG: 3; .375 INJECTION, POWDER, LYOPHILIZED, FOR SOLUTION INTRAVENOUS at 11:37

## 2025-06-02 RX ADMIN — PIPERACILLIN AND TAZOBACTAM 3375 MG: 3; .375 INJECTION, POWDER, LYOPHILIZED, FOR SOLUTION INTRAVENOUS at 03:30

## 2025-06-02 RX ADMIN — MORPHINE SULFATE 4 MG: 4 INJECTION, SOLUTION INTRAMUSCULAR; INTRAVENOUS at 03:21

## 2025-06-02 RX ADMIN — ENOXAPARIN SODIUM 40 MG: 100 INJECTION SUBCUTANEOUS at 09:08

## 2025-06-02 RX ADMIN — ACETAMINOPHEN 1000 MG: 500 TABLET ORAL at 06:18

## 2025-06-02 RX ADMIN — IOPAMIDOL 75 ML: 755 INJECTION, SOLUTION INTRAVENOUS at 10:13

## 2025-06-02 RX ADMIN — FAMOTIDINE 20 MG: 20 TABLET, FILM COATED ORAL at 09:08

## 2025-06-02 ASSESSMENT — PAIN SCALES - GENERAL: PAINLEVEL_OUTOF10: 9

## 2025-06-02 ASSESSMENT — PAIN DESCRIPTION - DESCRIPTORS: DESCRIPTORS: ACHING

## 2025-06-02 ASSESSMENT — PAIN DESCRIPTION - LOCATION: LOCATION: LEG

## 2025-06-02 NOTE — PROGRESS NOTES
CLINICAL PHARMACY NOTE: MEDS TO BEDS    Total # of Prescriptions Filled: 2   The following medications were delivered to the patient:  Current Discharge Medication List        START taking these medications    Details   oxyCODONE (ROXICODONE) 5 MG immediate release tablet Take 1 tablet by mouth every 6 hours as needed for Pain for up to 7 days. Max Daily Amount: 20 mg  Qty: 15 tablet, Refills: 0    Comments: Reduce doses taken as pain becomes manageable  Associated Diagnoses: Abscess of sigmoid colon due to diverticulitis      amoxicillin-clavulanate (AUGMENTIN) 875-125 MG per tablet Take 1 tablet by mouth 2 times daily for 14 days  Qty: 28 tablet, Refills: 0               Additional Documentation: Picked up by patient

## 2025-06-02 NOTE — PROGRESS NOTES
Patient educated on flushing NGUYEN Drain and placing back to bulb suction. Pt educated on discharge instructions and follow-up appointments. Pt states no further questions at this time. Pt IV removed with no complications. Pt transported to [facility] by [facility], transported home by boyfriend.    Electronically signed by Rodriguez Jorge RN on 6/2/25 at 4:18 PM EDT

## 2025-06-02 NOTE — DISCHARGE INSTRUCTIONS
bulb when it is half full.  Wash your hands with soap and water.  Take the plug out of the bulb.  Empty the bulb.   If your doctor asks you to measure the fluid, empty the fluid into a measuring cup, and write down the color and how much you collected. Your doctor will want to know this information.  Clean the plug with alcohol.  Squeeze the bulb until it is flat.   This removes all the air from the bulb. You may need to put the bulb on a table or a counter to flatten it.  Keep the bulb flat, and put the plug in.   The bulb should stay flat after you put the plug back in. This creates the suction that pulls the fluid into the bulb.  Empty the fluid into the toilet.  Wash your hands.  How do you change the dressing around your surgical drain?  You may have a dressing (bandage). The dressing is often made of gauze pads held on with tape. Your doctor will tell you how often to change it.  Wash your hands with soap and water.  Take off the dressing from around the drain.  Clean the drain site and the skin around it with soap and water.   Use gauze or a cotton swab.  When the site is dry, put on a new dressing.   The way your dressing is put on depends on what kind of drain you have. You will get instructions for your type of drain.  Wash your hands again with soap and water.  Your doctor may ask you to keep track of your dressing changes. Write down the time of day and the amount and color of the fluid on the dressing.  How do you help prevent clogs in your surgical drain?  Squeezing or \"milking\" the tube of your surgical drain can help prevent clogs so that it drains correctly. Your doctor will tell you when you need to do this. In general, you do this when:  You see a clot in the tube that prevents fluid from draining. The clot may look like a dark, stringy lining.  You see fluid leaking around the tube where it goes into the skin.  Follow these steps for milking the tube.  Use one hand to hold and pinch the tube

## 2025-06-02 NOTE — PLAN OF CARE
Problem: Chronic Conditions and Co-morbidities  Goal: Patient's chronic conditions and co-morbidity symptoms are monitored and maintained or improved  6/1/2025 2331 by Jazmin Foote RN  Outcome: Progressing  6/1/2025 0957 by Rosie Mcginnis RN  Outcome: Progressing     Problem: Discharge Planning  Goal: Discharge to home or other facility with appropriate resources  6/1/2025 2331 by Jazmin Foote RN  Outcome: Progressing  6/1/2025 0957 by Rosie Mcginnis RN  Outcome: Progressing     Problem: Pain  Goal: Verbalizes/displays adequate comfort level or baseline comfort level  6/1/2025 2331 by Jazmin Foote RN  Outcome: Progressing  6/1/2025 0957 by Rosie Mcginnis RN  Outcome: Progressing     Problem: ABCDS Injury Assessment  Goal: Absence of physical injury  6/1/2025 2331 by Jazmin Foote RN  Outcome: Progressing  6/1/2025 0957 by Rosie Mcginnis RN  Outcome: Progressing     Problem: Safety - Adult  Goal: Free from fall injury  6/1/2025 2331 by Jazmin Foote RN  Outcome: Progressing  6/1/2025 0957 by Rosie Mcginnis RN  Outcome: Progressing     Problem: Nutrition Deficit:  Goal: Optimize nutritional status  6/1/2025 2331 by Jazmin Foote RN  Outcome: Progressing  6/1/2025 0957 by Rosie Mcginnis RN  Outcome: Progressing

## 2025-06-02 NOTE — PROGRESS NOTES
General Surgery  Daily Progress Note    Pt Name: Piper Walker  Medical Record Number: [id number]  Date of Birth 1960   Today's Date: 6/2/2025    No chief complaint on file.      ASSESSMENT/PLAN  Sigmoid diverticulitis with pelvic abscess s/p perc drain - abdominal pain and leukocytosis resolved.  Continue perc drain.  Cultures show beta strep.  Continue zosyn.  Continue regular diet and bowel regimen. CT abd/pelvis repeated today, showing resolution of abscess.  Given thin brown fluid I am concerned about an ongoing fistula.  Continue perc drain for now.  Will discharge home and have her follow up in 1 week.  Continue augmentin x 2 weeks after discharge.  COPD - continue albuterol inhaler   Tobacco abuse - we discussed smoking cessation      SUBJECTIVE  Piper has improved from yesterday. She denies any significant pain. She has no nausea and no vomiting.  She has passed flatus and has had a bowel movement. She is tolerating regular diet. Current activity is ad cathy    OBJECTIVE  VITALS:  height is 1.651 m (5' 5\") and weight is 54.1 kg (119 lb 4.3 oz). Her oral temperature is 97.9 °F (36.6 °C). Her blood pressure is 132/81 and her pulse is 72. Her respiration is 18 and oxygen saturation is 98%.   GENERAL: alert, no distress  LUNGS: normal respiratory effort, no accessory muscle use  HEART: normal rate and regular rhythm  ABDOMEN: soft, NT, ND, right buttock perc drain with thin brown fluid  EXTREMITY: no cyanosis and no clubbing  I/O last 3 completed shifts:  In: 1340 [P.O.:1340]  Out: 7.5 [Drains:7.5]  I/O this shift:  In: 400 [P.O.:400]  Out: -     LABS  Recent Labs     06/02/25  0952   WBC 9.8   HGB 12.2   HCT 35.5*   *      K 3.8      CO2 25   BUN 6*   CREATININE 0.6   MG 1.67*   CALCIUM 8.7   CBC with Differential:    Lab Results   Component Value Date/Time    WBC 9.8 06/02/2025 09:52 AM    RBC 4.02 06/02/2025 09:52 AM    HGB 12.2 06/02/2025 09:52 AM    HCT 35.5 06/02/2025 09:52 AM

## 2025-06-02 NOTE — PLAN OF CARE
Problem: Chronic Conditions and Co-morbidities  Goal: Patient's chronic conditions and co-morbidity symptoms are monitored and maintained or improved  Outcome: Completed     Problem: Discharge Planning  Goal: Discharge to home or other facility with appropriate resources  Outcome: Completed     Problem: Pain  Goal: Verbalizes/displays adequate comfort level or baseline comfort level  Outcome: Completed     Problem: ABCDS Injury Assessment  Goal: Absence of physical injury  Outcome: Completed     Problem: Safety - Adult  Goal: Free from fall injury  Outcome: Completed     Problem: Nutrition Deficit:  Goal: Optimize nutritional status  Outcome: Completed

## 2025-06-09 ENCOUNTER — TELEPHONE (OUTPATIENT)
Dept: SURGERY | Age: 65
End: 2025-06-09

## 2025-06-09 ENCOUNTER — OFFICE VISIT (OUTPATIENT)
Dept: SURGERY | Age: 65
End: 2025-06-09

## 2025-06-09 VITALS — SYSTOLIC BLOOD PRESSURE: 125 MMHG | HEART RATE: 91 BPM | DIASTOLIC BLOOD PRESSURE: 80 MMHG

## 2025-06-09 DIAGNOSIS — K57.32 SIGMOID DIVERTICULITIS: Primary | ICD-10-CM

## 2025-06-09 DIAGNOSIS — N73.9 PELVIC ABSCESS IN FEMALE: ICD-10-CM

## 2025-06-09 PROCEDURE — 99213 OFFICE O/P EST LOW 20 MIN: CPT | Performed by: SURGERY

## 2025-06-09 NOTE — TELEPHONE ENCOUNTER
Patient calling stating that she cannot have her CT scan next Thursday 6/19 at 1:00. Do you want to order this as a STAT or is it ok to have it done next Thursday. Please call Jesusita at [phone].

## 2025-06-09 NOTE — PROGRESS NOTES
Established Patient Visit    [facility]   Ashutosh Nunez MD    [address], [address]  [place], [place] [address]  Phone: [phone]  Fax: [phone]    Piper Walker   YOB: 1960    Date of Visit:  6/9/2025    No ref. provider found  Tata Loredo, APRN - CNP    Subjective:     Piper Walker presents for a follow-up of her hospital stay for sigmoid diverticulitis with pelvic abscess. Overall she has been doing better since her discharge.  She is still having brown drainage from her percutaneous drain.  She has some lower abdominal pain.  She is eating and drinking okay.  Her bowels are working.  She denies any fevers or chills.       No Known Allergies  Outpatient Medications Marked as Taking for the 6/9/25 encounter (Office Visit) with Ashutosh Nunez MD   Medication Sig Dispense Refill    amoxicillin-clavulanate (AUGMENTIN) 875-125 MG per tablet Take 1 tablet by mouth 2 times daily for 14 days 28 tablet 0       Vitals:    06/09/25 1321   BP: 125/80   Pulse: 91     There is no height or weight on file to calculate BMI.     Wt Readings from Last 3 Encounters:   06/02/25 54.1 kg (119 lb 4.3 oz)   05/29/25 52.5 kg (115 lb 11.9 oz)   05/10/25 52.5 kg (115 lb 11.9 oz)     BP Readings from Last 3 Encounters:   06/09/25 125/80   06/02/25 132/81   05/29/25 124/74          Objective:    CONSTITUTIONAL:  awake, alert, no apparent distress    LUNGS:  Resp easy and unlabored  ABDOMEN: Soft, minimal lower abdominal tenderness, nondistended, no peritonitis, right gluteal percutaneous drain with mild fluid  MUSCULOSKELETAL: No edema  NEUROLOGIC:  Mental Status Exam:  Level of Alertness:   awake  Orientation:   person, place, time  SKIN: No erythema or induration    ASSESSMENT:     Diagnosis Orders   1. Sigmoid diverticulitis  CT ABDOMEN PELVIS W IV CONTRAST Additional Contrast? Rectal    amoxicillin-clavulanate (AUGMENTIN) 875-125 MG per

## 2025-06-19 ENCOUNTER — HOSPITAL ENCOUNTER (OUTPATIENT)
Dept: CT IMAGING | Age: 65
Discharge: HOME | End: 2025-06-19
Attending: SURGERY

## 2025-06-19 DIAGNOSIS — K57.32 SIGMOID DIVERTICULITIS: ICD-10-CM

## 2025-06-19 DIAGNOSIS — N73.9 PELVIC ABSCESS IN FEMALE: ICD-10-CM

## 2025-06-19 PROCEDURE — 74177 CT ABD & PELVIS W/CONTRAST: CPT

## 2025-06-19 PROCEDURE — 6360000004 HC RX CONTRAST MEDICATION: Performed by: SURGERY

## 2025-06-19 RX ORDER — DIATRIZOATE MEGLUMINE AND DIATRIZOATE SODIUM 660; 100 MG/ML; MG/ML
30 SOLUTION ORAL; RECTAL
Status: DISCONTINUED | OUTPATIENT
Start: 2025-06-19 | End: 2025-06-20 | Stop reason: HOSPADM

## 2025-06-19 RX ORDER — IOPAMIDOL 755 MG/ML
75 INJECTION, SOLUTION INTRAVASCULAR
Status: COMPLETED | OUTPATIENT
Start: 2025-06-19 | End: 2025-06-19

## 2025-06-19 RX ORDER — IOPAMIDOL 612 MG/ML
50 INJECTION, SOLUTION INTRAVASCULAR
Status: COMPLETED | OUTPATIENT
Start: 2025-06-19 | End: 2025-06-19

## 2025-06-19 RX ADMIN — IOPAMIDOL 50 ML: 612 INJECTION, SOLUTION INTRAVENOUS at 13:24

## 2025-06-19 RX ADMIN — DIATRIZOATE MEGLUMINE AND DIATRIZOATE SODIUM 30 ML: 600; 100 SOLUTION ORAL; RECTAL at 13:24

## 2025-06-19 RX ADMIN — IOPAMIDOL 75 ML: 755 INJECTION, SOLUTION INTRAVENOUS at 13:24

## 2025-06-20 ENCOUNTER — OFFICE VISIT (OUTPATIENT)
Dept: SURGERY | Age: 65
End: 2025-06-20

## 2025-06-20 VITALS — HEART RATE: 77 BPM | DIASTOLIC BLOOD PRESSURE: 79 MMHG | SYSTOLIC BLOOD PRESSURE: 123 MMHG

## 2025-06-20 DIAGNOSIS — N73.9 PELVIC ABSCESS IN FEMALE: ICD-10-CM

## 2025-06-20 DIAGNOSIS — K57.32 SIGMOID DIVERTICULITIS: ICD-10-CM

## 2025-06-20 DIAGNOSIS — K63.2 COLO-ENTERIC FISTULA: ICD-10-CM

## 2025-06-20 DIAGNOSIS — K57.32 SIGMOID DIVERTICULITIS: Primary | ICD-10-CM

## 2025-06-20 LAB
ANION GAP SERPL CALCULATED.3IONS-SCNC: 9 MMOL/L (ref 3–16)
BASOPHILS # BLD: 0.1 K/UL (ref 0–0.2)
BASOPHILS NFR BLD: 1.2 %
BUN SERPL-MCNC: 13 MG/DL (ref 7–20)
CALCIUM SERPL-MCNC: 9.8 MG/DL (ref 8.3–10.6)
CHLORIDE SERPL-SCNC: 105 MMOL/L (ref 99–110)
CO2 SERPL-SCNC: 26 MMOL/L (ref 21–32)
CREAT SERPL-MCNC: 0.8 MG/DL (ref 0.6–1.2)
DEPRECATED RDW RBC AUTO: 16.8 % (ref 12.4–15.4)
EOSINOPHIL # BLD: 0.5 K/UL (ref 0–0.6)
EOSINOPHIL NFR BLD: 8.8 %
GFR SERPLBLD CREATININE-BSD FMLA CKD-EPI: 83 ML/MIN/{1.73_M2}
GLUCOSE SERPL-MCNC: 100 MG/DL (ref 70–99)
HCT VFR BLD AUTO: 38.9 % (ref 36–48)
HGB BLD-MCNC: 13.3 G/DL (ref 12–16)
LYMPHOCYTES # BLD: 2.3 K/UL (ref 1–5.1)
LYMPHOCYTES NFR BLD: 37.8 %
MCH RBC QN AUTO: 31.2 PG (ref 26–34)
MCHC RBC AUTO-ENTMCNC: 34.3 G/DL (ref 31–36)
MCV RBC AUTO: 91 FL (ref 80–100)
MONOCYTES # BLD: 0.7 K/UL (ref 0–1.3)
MONOCYTES NFR BLD: 12.1 %
NEUTROPHILS # BLD: 2.5 K/UL (ref 1.7–7.7)
NEUTROPHILS NFR BLD: 40.1 %
PLATELET # BLD AUTO: 346 K/UL (ref 135–450)
PMV BLD AUTO: 7.8 FL (ref 5–10.5)
POTASSIUM SERPL-SCNC: 5.4 MMOL/L (ref 3.5–5.1)
PREALB SERPL-MCNC: 20.7 MG/DL (ref 20–40)
RBC # BLD AUTO: 4.28 M/UL (ref 4–5.2)
SODIUM SERPL-SCNC: 140 MMOL/L (ref 136–145)
WBC # BLD AUTO: 6.2 K/UL (ref 4–11)

## 2025-06-20 PROCEDURE — 99214 OFFICE O/P EST MOD 30 MIN: CPT | Performed by: SURGERY

## 2025-06-20 NOTE — PROGRESS NOTES
Established Patient Visit    [facility]   Ashutosh Nunez MD    [address], [address]  [place], [place] [address]  Phone: [phone]  Fax: [phone]    Piper Walker   YOB: 1960    Date of Visit:  6/20/2025    No ref. provider found  Tata Loredo, APRN - CNP    Subjective:     Piper Walker presents for a follow-up of her sigmoid diverticulitis with pelvic abscess s/p perc drain. Overall she has been doing well since her last visit.   She has been eating/drinking without difficulty.    There has been  complete resolution of her pain. She has minimal drainage from her percutaneous drain.  She denies any fevers or chills.         No Known Allergies  Outpatient Medications Marked as Taking for the 6/20/25 encounter (Office Visit) with Ashutosh Nunez MD   Medication Sig Dispense Refill    amoxicillin-clavulanate (AUGMENTIN) 875-125 MG per tablet Take 1 tablet by mouth 2 times daily for 28 days 56 tablet 0       Vitals:    06/20/25 1013   BP: 123/79   Pulse: 77     There is no height or weight on file to calculate BMI.     Wt Readings from Last 3 Encounters:   06/02/25 54.1 kg (119 lb 4.3 oz)   05/29/25 52.5 kg (115 lb 11.9 oz)   05/10/25 52.5 kg (115 lb 11.9 oz)     BP Readings from Last 3 Encounters:   06/20/25 123/79   06/09/25 125/80   06/02/25 132/81          Objective:    CONSTITUTIONAL:  awake, alert, no apparent distress    LUNGS:  Resp easy and unlabored  ABDOMEN:   soft, non-distended, non-tender  Perc drain with minimal dark drainage  MUSCULOSKELETAL: No edema  NEUROLOGIC:  Mental Status Exam:  Level of Alertness:   awake  Orientation:   person, place, time  SKIN: no erythema or induration        Radiology:  CT abdomen pelvis from 6/19/2025 personally reviewed, showing rectal contrast illuminating up to transverse colon, but also with contrast within the small bowel consistent with fistula formation; resolution of

## 2025-06-20 NOTE — PATIENT INSTRUCTIONS
Surgeon: Ashutosh Nunez MD  Your surgery will be at [facility]  ? Check in for surgery on the first floor of the [facility] hospital at the SURGERY DESK?    Date:    Arrival time:    Surgery time:     ( X )  INPATIENT STAY         Please arrange a ride to and from the hospital.   No driving for 1 week after surgery. (Or while on pain medication)   Nothing to eat or drink after midnight the night before surgery  No tobacco or nicotine after midnight prior to surgery  You may take all your regular maintenance prescriptions in the morning with a small sip of water to wash them down.   Please refrain from taking all vitamins and/or nutritional supplements for 48 hours prior to your procedure.   If you take a blood thinner (Coumadin, Plavix, Xaretlo, Eliquis, and over the counter fish oil (Omega 3)), you may be asked to stop this medication up to 5 days prior to surgery, if this was not addressed, please contact the office for clarification.         Last dose: ______________    If you take an oral or injectable diabetes / weight loss medication (GLP-1 agonist), listed below, you will need to stop taking it before surgery.   For medications taken daily, do not take for at least 1 day before surgery   For medications taken weekly, do not take for at least 7 days before surgery  Semaglutide (OZEMPIC, WEGOVY, RYBELSUS)  Dulaglutide (TRULICITY)  Liraglutide (VICTOZA)  Tiezepatide (MOUNJARO)  Orlistat (DOM / ZENICAL)  Phentermine (ADIPEX) / Topiramate (QSYMIA)    Last dose: ______________    Restrictions: 15lbs weight restrictions for 4 weeks after surgery.    Working patients:   Patient is to remain off work for 2 weeks after surgery.   Patient may return to work LIGHT DUTY (15lbs weight restriction) after 2 weeks.   If LIGHT DUTY is not allowed, patient must remain off work for the full 4 weeks.     *Within 30 days of surgery*     (   )Primary Care Clearance    (   ) Pulmonary Clearance    (   ) Cardiac Clearance    (

## 2025-07-01 NOTE — PROGRESS NOTES
[facility] PRE-OPERATIVE INSTRUCTIONS    Day of Procedure: 7/9/25               Arrival time:  0700            Surgery time:0830    Take the following medications with a sip of water:  Follow your MD/Surgeons pre-procedure instructions regarding your medications     Do not eat or drink anything after 12:00 midnight prior to your surgery.  This includes water, chewing gum, mints and ice chips.   You may brush your teeth and gargle the morning of your surgery, but do not swallow the water.     Please see your family doctor/pediatrician for a history and physical and/or concerning medications.   Bring any test results/reports from your physicians office.   If you are under the care of a heart doctor or specialist doctor, please be aware that you may be asked to see them for clearance.    You may be asked to stop blood thinners such as Coumadin, Plavix, Fragmin, Lovenox, etc., or any anti-inflammatories such as:  Aspirin, Ibuprofen, Advil, Naproxen prior to your surgery.    We also ask that you stop any over the counter medications such as fish oil, vitamin E, glucosamine, garlic, Multivitamins, COQ 10, etc.    We ask that you do not smoke 24 hours prior to surgery.  We ask that you do not  drink any alcoholic beverages 24 hours prior to surgery     You must make arrangements for a responsible adult to take you home after your surgery.    For your safety, you will not be allowed to leave alone or drive yourself home.  Your surgery will be cancelled if you do not have a ride home.     Also for your safety, you must have someone stay with you the first 24 hours after your surgery.     A parent or legal guardian must accompany a child scheduled for surgery and plan to stay at the hospital until the child is discharged.    Please do not bring other children with you.    For your comfort, please wear simple loose fitting clothing to the hospital.  Please do not bring valuables.    Do not wear any make-up on face

## 2025-07-08 ENCOUNTER — ANESTHESIA EVENT (OUTPATIENT)
Dept: OPERATING ROOM | Age: 65
End: 2025-07-08

## 2025-07-09 ENCOUNTER — ANESTHESIA (OUTPATIENT)
Dept: OPERATING ROOM | Age: 65
End: 2025-07-09

## 2025-07-09 ENCOUNTER — HOSPITAL ENCOUNTER (INPATIENT)
Age: 65
LOS: 3 days | Discharge: HOME | End: 2025-07-12
Attending: SURGERY | Admitting: SURGERY

## 2025-07-09 DIAGNOSIS — K57.20 ABSCESS OF SIGMOID COLON DUE TO DIVERTICULITIS: Primary | ICD-10-CM

## 2025-07-09 DIAGNOSIS — K57.32 SIGMOID DIVERTICULITIS: ICD-10-CM

## 2025-07-09 DIAGNOSIS — N73.9 PELVIC ABSCESS IN FEMALE: ICD-10-CM

## 2025-07-09 LAB
ABO/RH: NORMAL
ANTIBODY SCREEN: NORMAL
GLUCOSE BLD-MCNC: 128 MG/DL (ref 70–99)
GLUCOSE BLD-MCNC: 77 MG/DL (ref 70–99)
PERFORMED ON: ABNORMAL
PERFORMED ON: NORMAL

## 2025-07-09 PROCEDURE — 0DJD8ZZ INSPECTION OF LOWER INTESTINAL TRACT, VIA NATURAL OR ARTIFICIAL OPENING ENDOSCOPIC: ICD-10-PCS | Performed by: SURGERY

## 2025-07-09 PROCEDURE — 3600000009 HC SURGERY ROBOT BASE: Performed by: SURGERY

## 2025-07-09 PROCEDURE — 2500000003 HC RX 250 WO HCPCS: Performed by: SURGERY

## 2025-07-09 PROCEDURE — 6360000002 HC RX W HCPCS: Performed by: ANESTHESIOLOGY

## 2025-07-09 PROCEDURE — 8E0W4CZ ROBOTIC ASSISTED PROCEDURE OF TRUNK REGION, PERCUTANEOUS ENDOSCOPIC APPROACH: ICD-10-PCS | Performed by: SURGERY

## 2025-07-09 PROCEDURE — 2580000003 HC RX 258: Performed by: SURGERY

## 2025-07-09 PROCEDURE — 2580000003 HC RX 258: Performed by: ANESTHESIOLOGY

## 2025-07-09 PROCEDURE — 86850 RBC ANTIBODY SCREEN: CPT

## 2025-07-09 PROCEDURE — 7100000000 HC PACU RECOVERY - FIRST 15 MIN: Performed by: SURGERY

## 2025-07-09 PROCEDURE — 2500000003 HC RX 250 WO HCPCS: Performed by: NURSE ANESTHETIST, CERTIFIED REGISTERED

## 2025-07-09 PROCEDURE — 3700000001 HC ADD 15 MINUTES (ANESTHESIA): Performed by: SURGERY

## 2025-07-09 PROCEDURE — 6360000002 HC RX W HCPCS: Performed by: NURSE ANESTHETIST, CERTIFIED REGISTERED

## 2025-07-09 PROCEDURE — 88307 TISSUE EXAM BY PATHOLOGIST: CPT

## 2025-07-09 PROCEDURE — 3600000019 HC SURGERY ROBOT ADDTL 15MIN: Performed by: SURGERY

## 2025-07-09 PROCEDURE — 2720000010 HC SURG SUPPLY STERILE: Performed by: SURGERY

## 2025-07-09 PROCEDURE — 6370000000 HC RX 637 (ALT 250 FOR IP): Performed by: SURGERY

## 2025-07-09 PROCEDURE — 3700000000 HC ANESTHESIA ATTENDED CARE: Performed by: SURGERY

## 2025-07-09 PROCEDURE — S2900 ROBOTIC SURGICAL SYSTEM: HCPCS | Performed by: SURGERY

## 2025-07-09 PROCEDURE — 6360000002 HC RX W HCPCS: Performed by: SURGERY

## 2025-07-09 PROCEDURE — 86901 BLOOD TYPING SEROLOGIC RH(D): CPT

## 2025-07-09 PROCEDURE — 2709999900 HC NON-CHARGEABLE SUPPLY: Performed by: SURGERY

## 2025-07-09 PROCEDURE — 7100000001 HC PACU RECOVERY - ADDTL 15 MIN: Performed by: SURGERY

## 2025-07-09 PROCEDURE — 86900 BLOOD TYPING SEROLOGIC ABO: CPT

## 2025-07-09 PROCEDURE — 1200000000 HC SEMI PRIVATE

## 2025-07-09 PROCEDURE — 44207 L COLECTOMY/COLOPROCTOSTOMY: CPT | Performed by: SURGERY

## 2025-07-09 PROCEDURE — 0DTN4ZZ RESECTION OF SIGMOID COLON, PERCUTANEOUS ENDOSCOPIC APPROACH: ICD-10-PCS | Performed by: SURGERY

## 2025-07-09 RX ORDER — SODIUM CHLORIDE 0.9 % (FLUSH) 0.9 %
5-40 SYRINGE (ML) INJECTION PRN
Status: DISCONTINUED | OUTPATIENT
Start: 2025-07-09 | End: 2025-07-09 | Stop reason: HOSPADM

## 2025-07-09 RX ORDER — OXYCODONE HYDROCHLORIDE 10 MG/1
10 TABLET ORAL PRN
Status: DISCONTINUED | OUTPATIENT
Start: 2025-07-09 | End: 2025-07-09 | Stop reason: HOSPADM

## 2025-07-09 RX ORDER — FENTANYL CITRATE 50 UG/ML
25 INJECTION, SOLUTION INTRAMUSCULAR; INTRAVENOUS EVERY 5 MIN PRN
Status: COMPLETED | OUTPATIENT
Start: 2025-07-09 | End: 2025-07-09

## 2025-07-09 RX ORDER — OXYCODONE HYDROCHLORIDE 10 MG/1
10 TABLET ORAL EVERY 4 HOURS PRN
Status: DISCONTINUED | OUTPATIENT
Start: 2025-07-09 | End: 2025-07-12 | Stop reason: HOSPADM

## 2025-07-09 RX ORDER — ROCURONIUM BROMIDE 10 MG/ML
INJECTION, SOLUTION INTRAVENOUS
Status: DISCONTINUED | OUTPATIENT
Start: 2025-07-09 | End: 2025-07-09 | Stop reason: SDUPTHER

## 2025-07-09 RX ORDER — METOPROLOL TARTRATE 1 MG/ML
INJECTION, SOLUTION INTRAVENOUS
Status: DISCONTINUED | OUTPATIENT
Start: 2025-07-09 | End: 2025-07-09 | Stop reason: SDUPTHER

## 2025-07-09 RX ORDER — MAGNESIUM SULFATE IN WATER 40 MG/ML
2000 INJECTION, SOLUTION INTRAVENOUS PRN
Status: DISCONTINUED | OUTPATIENT
Start: 2025-07-09 | End: 2025-07-12 | Stop reason: HOSPADM

## 2025-07-09 RX ORDER — MIDAZOLAM HYDROCHLORIDE 1 MG/ML
INJECTION, SOLUTION INTRAMUSCULAR; INTRAVENOUS
Status: DISCONTINUED | OUTPATIENT
Start: 2025-07-09 | End: 2025-07-09 | Stop reason: SDUPTHER

## 2025-07-09 RX ORDER — METRONIDAZOLE 500 MG/100ML
500 INJECTION, SOLUTION INTRAVENOUS
Status: COMPLETED | OUTPATIENT
Start: 2025-07-09 | End: 2025-07-09

## 2025-07-09 RX ORDER — OXYCODONE HYDROCHLORIDE 5 MG/1
5 TABLET ORAL EVERY 4 HOURS PRN
Status: DISCONTINUED | OUTPATIENT
Start: 2025-07-09 | End: 2025-07-12 | Stop reason: HOSPADM

## 2025-07-09 RX ORDER — ENOXAPARIN SODIUM 100 MG/ML
40 INJECTION SUBCUTANEOUS DAILY
Status: DISCONTINUED | OUTPATIENT
Start: 2025-07-09 | End: 2025-07-12 | Stop reason: HOSPADM

## 2025-07-09 RX ORDER — SODIUM CHLORIDE 0.9 % (FLUSH) 0.9 %
5-40 SYRINGE (ML) INJECTION EVERY 12 HOURS SCHEDULED
Status: DISCONTINUED | OUTPATIENT
Start: 2025-07-09 | End: 2025-07-12 | Stop reason: HOSPADM

## 2025-07-09 RX ORDER — SODIUM CHLORIDE 0.9 % (FLUSH) 0.9 %
5-40 SYRINGE (ML) INJECTION EVERY 12 HOURS SCHEDULED
Status: DISCONTINUED | OUTPATIENT
Start: 2025-07-09 | End: 2025-07-09 | Stop reason: HOSPADM

## 2025-07-09 RX ORDER — SODIUM CHLORIDE 9 MG/ML
INJECTION, SOLUTION INTRAVENOUS PRN
Status: DISCONTINUED | OUTPATIENT
Start: 2025-07-09 | End: 2025-07-12 | Stop reason: HOSPADM

## 2025-07-09 RX ORDER — ONDANSETRON 2 MG/ML
4 INJECTION INTRAMUSCULAR; INTRAVENOUS
Status: DISCONTINUED | OUTPATIENT
Start: 2025-07-09 | End: 2025-07-09 | Stop reason: HOSPADM

## 2025-07-09 RX ORDER — HYDRALAZINE HYDROCHLORIDE 20 MG/ML
INJECTION INTRAMUSCULAR; INTRAVENOUS
Status: DISCONTINUED | OUTPATIENT
Start: 2025-07-09 | End: 2025-07-09 | Stop reason: SDUPTHER

## 2025-07-09 RX ORDER — SENNA AND DOCUSATE SODIUM 50; 8.6 MG/1; MG/1
1 TABLET, FILM COATED ORAL 2 TIMES DAILY
Status: DISCONTINUED | OUTPATIENT
Start: 2025-07-09 | End: 2025-07-12 | Stop reason: HOSPADM

## 2025-07-09 RX ORDER — METHOCARBAMOL 100 MG/ML
INJECTION, SOLUTION INTRAMUSCULAR; INTRAVENOUS
Status: DISCONTINUED | OUTPATIENT
Start: 2025-07-09 | End: 2025-07-09 | Stop reason: SDUPTHER

## 2025-07-09 RX ORDER — SODIUM CHLORIDE 0.9 % (FLUSH) 0.9 %
5-40 SYRINGE (ML) INJECTION PRN
Status: DISCONTINUED | OUTPATIENT
Start: 2025-07-09 | End: 2025-07-12 | Stop reason: HOSPADM

## 2025-07-09 RX ORDER — MEPERIDINE HYDROCHLORIDE 50 MG/ML
12.5 INJECTION INTRAMUSCULAR; INTRAVENOUS; SUBCUTANEOUS EVERY 5 MIN PRN
Status: DISCONTINUED | OUTPATIENT
Start: 2025-07-09 | End: 2025-07-09 | Stop reason: HOSPADM

## 2025-07-09 RX ORDER — DEXTROSE MONOHYDRATE, SODIUM CHLORIDE, AND POTASSIUM CHLORIDE 50; 1.49; 4.5 G/1000ML; G/1000ML; G/1000ML
INJECTION, SOLUTION INTRAVENOUS CONTINUOUS
Status: DISCONTINUED | OUTPATIENT
Start: 2025-07-09 | End: 2025-07-12 | Stop reason: HOSPADM

## 2025-07-09 RX ORDER — ONDANSETRON 2 MG/ML
4 INJECTION INTRAMUSCULAR; INTRAVENOUS EVERY 6 HOURS PRN
Status: DISCONTINUED | OUTPATIENT
Start: 2025-07-09 | End: 2025-07-12 | Stop reason: HOSPADM

## 2025-07-09 RX ORDER — SODIUM CHLORIDE 9 MG/ML
INJECTION, SOLUTION INTRAVENOUS PRN
Status: DISCONTINUED | OUTPATIENT
Start: 2025-07-09 | End: 2025-07-09 | Stop reason: HOSPADM

## 2025-07-09 RX ORDER — ACETAMINOPHEN 500 MG
1000 TABLET ORAL EVERY 8 HOURS SCHEDULED
Status: DISCONTINUED | OUTPATIENT
Start: 2025-07-09 | End: 2025-07-12 | Stop reason: HOSPADM

## 2025-07-09 RX ORDER — FENTANYL CITRATE 50 UG/ML
50 INJECTION, SOLUTION INTRAMUSCULAR; INTRAVENOUS EVERY 5 MIN PRN
Status: DISCONTINUED | OUTPATIENT
Start: 2025-07-09 | End: 2025-07-09 | Stop reason: HOSPADM

## 2025-07-09 RX ORDER — ONDANSETRON 4 MG/1
4 TABLET, ORALLY DISINTEGRATING ORAL EVERY 8 HOURS PRN
Status: DISCONTINUED | OUTPATIENT
Start: 2025-07-09 | End: 2025-07-12 | Stop reason: HOSPADM

## 2025-07-09 RX ORDER — INDOCYANINE GREEN AND WATER 25 MG
KIT INJECTION
Status: DISCONTINUED | OUTPATIENT
Start: 2025-07-09 | End: 2025-07-09 | Stop reason: SDUPTHER

## 2025-07-09 RX ORDER — FENTANYL CITRATE 50 UG/ML
INJECTION, SOLUTION INTRAMUSCULAR; INTRAVENOUS
Status: DISCONTINUED | OUTPATIENT
Start: 2025-07-09 | End: 2025-07-09 | Stop reason: SDUPTHER

## 2025-07-09 RX ORDER — POTASSIUM CHLORIDE 7.45 MG/ML
10 INJECTION INTRAVENOUS PRN
Status: DISCONTINUED | OUTPATIENT
Start: 2025-07-09 | End: 2025-07-12 | Stop reason: HOSPADM

## 2025-07-09 RX ORDER — MAGNESIUM HYDROXIDE 1200 MG/15ML
LIQUID ORAL CONTINUOUS PRN
Status: COMPLETED | OUTPATIENT
Start: 2025-07-09 | End: 2025-07-09

## 2025-07-09 RX ORDER — PANTOPRAZOLE SODIUM 40 MG/1
40 TABLET, DELAYED RELEASE ORAL
Status: DISCONTINUED | OUTPATIENT
Start: 2025-07-10 | End: 2025-07-12 | Stop reason: HOSPADM

## 2025-07-09 RX ORDER — PROPOFOL 10 MG/ML
INJECTION, EMULSION INTRAVENOUS
Status: DISCONTINUED | OUTPATIENT
Start: 2025-07-09 | End: 2025-07-09 | Stop reason: SDUPTHER

## 2025-07-09 RX ORDER — OXYCODONE HYDROCHLORIDE 5 MG/1
5 TABLET ORAL PRN
Status: DISCONTINUED | OUTPATIENT
Start: 2025-07-09 | End: 2025-07-09 | Stop reason: HOSPADM

## 2025-07-09 RX ORDER — KETOROLAC TROMETHAMINE 30 MG/ML
INJECTION, SOLUTION INTRAMUSCULAR; INTRAVENOUS
Status: DISCONTINUED | OUTPATIENT
Start: 2025-07-09 | End: 2025-07-09 | Stop reason: SDUPTHER

## 2025-07-09 RX ORDER — ONDANSETRON 2 MG/ML
INJECTION INTRAMUSCULAR; INTRAVENOUS
Status: DISCONTINUED | OUTPATIENT
Start: 2025-07-09 | End: 2025-07-09 | Stop reason: SDUPTHER

## 2025-07-09 RX ORDER — LIDOCAINE HYDROCHLORIDE 20 MG/ML
INJECTION, SOLUTION EPIDURAL; INFILTRATION; INTRACAUDAL; PERINEURAL
Status: DISCONTINUED | OUTPATIENT
Start: 2025-07-09 | End: 2025-07-09 | Stop reason: SDUPTHER

## 2025-07-09 RX ORDER — DEXAMETHASONE SODIUM PHOSPHATE 4 MG/ML
INJECTION, SOLUTION INTRA-ARTICULAR; INTRALESIONAL; INTRAMUSCULAR; INTRAVENOUS; SOFT TISSUE
Status: DISCONTINUED | OUTPATIENT
Start: 2025-07-09 | End: 2025-07-09 | Stop reason: SDUPTHER

## 2025-07-09 RX ORDER — FAMOTIDINE 10 MG/ML
INJECTION, SOLUTION INTRAVENOUS
Status: DISCONTINUED | OUTPATIENT
Start: 2025-07-09 | End: 2025-07-09 | Stop reason: SDUPTHER

## 2025-07-09 RX ADMIN — HYDRALAZINE HYDROCHLORIDE 5 MG: 20 INJECTION INTRAMUSCULAR; INTRAVENOUS at 10:40

## 2025-07-09 RX ADMIN — SUGAMMADEX 200 MG: 100 INJECTION, SOLUTION INTRAVENOUS at 11:03

## 2025-07-09 RX ADMIN — Medication 10 MG: at 10:20

## 2025-07-09 RX ADMIN — SODIUM CHLORIDE: 9 INJECTION, SOLUTION INTRAVENOUS at 08:13

## 2025-07-09 RX ADMIN — HYDROMORPHONE HYDROCHLORIDE 0.5 MG: 1 INJECTION, SOLUTION INTRAMUSCULAR; INTRAVENOUS; SUBCUTANEOUS at 10:57

## 2025-07-09 RX ADMIN — FENTANYL CITRATE 50 MCG: 50 INJECTION INTRAMUSCULAR; INTRAVENOUS at 08:30

## 2025-07-09 RX ADMIN — FENTANYL CITRATE 25 MCG: 50 INJECTION INTRAMUSCULAR; INTRAVENOUS at 14:13

## 2025-07-09 RX ADMIN — OXYCODONE HYDROCHLORIDE 10 MG: 10 TABLET ORAL at 19:48

## 2025-07-09 RX ADMIN — DEXTROSE, SODIUM CHLORIDE, AND POTASSIUM CHLORIDE: 5; .45; .15 INJECTION INTRAVENOUS at 17:45

## 2025-07-09 RX ADMIN — METHOCARBAMOL 250 MG: 100 INJECTION INTRAMUSCULAR; INTRAVENOUS at 08:53

## 2025-07-09 RX ADMIN — ONDANSETRON 4 MG: 2 INJECTION INTRAMUSCULAR; INTRAVENOUS at 10:50

## 2025-07-09 RX ADMIN — LIDOCAINE HYDROCHLORIDE 100 MG: 20 INJECTION, SOLUTION EPIDURAL; INFILTRATION; INTRACAUDAL; PERINEURAL at 08:30

## 2025-07-09 RX ADMIN — ENOXAPARIN SODIUM 40 MG: 100 INJECTION SUBCUTANEOUS at 17:45

## 2025-07-09 RX ADMIN — PIPERACILLIN AND TAZOBACTAM 3375 MG: 3; .375 INJECTION, POWDER, LYOPHILIZED, FOR SOLUTION INTRAVENOUS at 22:55

## 2025-07-09 RX ADMIN — ACETAMINOPHEN 1000 MG: 500 TABLET ORAL at 22:53

## 2025-07-09 RX ADMIN — HYDROMORPHONE HYDROCHLORIDE 0.5 MG: 1 INJECTION, SOLUTION INTRAMUSCULAR; INTRAVENOUS; SUBCUTANEOUS at 09:13

## 2025-07-09 RX ADMIN — FENTANYL CITRATE 25 MCG: 50 INJECTION INTRAMUSCULAR; INTRAVENOUS at 15:58

## 2025-07-09 RX ADMIN — INDOCYANINE GREEN AND WATER 7.5 MG: KIT at 09:49

## 2025-07-09 RX ADMIN — KETOROLAC TROMETHAMINE 15 MG: 30 INJECTION, SOLUTION INTRAMUSCULAR at 10:53

## 2025-07-09 RX ADMIN — SENNOSIDES AND DOCUSATE SODIUM 1 TABLET: 8.6; 5 TABLET ORAL at 19:47

## 2025-07-09 RX ADMIN — FENTANYL CITRATE 50 MCG: 50 INJECTION INTRAMUSCULAR; INTRAVENOUS at 11:41

## 2025-07-09 RX ADMIN — HYDROMORPHONE HYDROCHLORIDE 0.5 MG: 1 INJECTION, SOLUTION INTRAMUSCULAR; INTRAVENOUS; SUBCUTANEOUS at 09:04

## 2025-07-09 RX ADMIN — FENTANYL CITRATE 25 MCG: 50 INJECTION INTRAMUSCULAR; INTRAVENOUS at 14:05

## 2025-07-09 RX ADMIN — METHOCARBAMOL 250 MG: 100 INJECTION INTRAMUSCULAR; INTRAVENOUS at 08:58

## 2025-07-09 RX ADMIN — METOPROLOL TARTRATE 2 MG: 5 INJECTION, SOLUTION INTRAVENOUS at 09:35

## 2025-07-09 RX ADMIN — FAMOTIDINE 20 MG: 10 INJECTION, SOLUTION INTRAVENOUS at 08:24

## 2025-07-09 RX ADMIN — ONDANSETRON 4 MG: 2 INJECTION, SOLUTION INTRAMUSCULAR; INTRAVENOUS at 23:06

## 2025-07-09 RX ADMIN — HYDROMORPHONE HYDROCHLORIDE 1 MG: 1 INJECTION, SOLUTION INTRAMUSCULAR; INTRAVENOUS; SUBCUTANEOUS at 17:00

## 2025-07-09 RX ADMIN — FENTANYL CITRATE 25 MCG: 50 INJECTION INTRAMUSCULAR; INTRAVENOUS at 15:53

## 2025-07-09 RX ADMIN — METOPROLOL TARTRATE 3 MG: 5 INJECTION, SOLUTION INTRAVENOUS at 09:45

## 2025-07-09 RX ADMIN — Medication 10 MG: at 09:20

## 2025-07-09 RX ADMIN — ROCURONIUM BROMIDE 5 MG: 10 SOLUTION INTRAVENOUS at 10:47

## 2025-07-09 RX ADMIN — FENTANYL CITRATE 50 MCG: 50 INJECTION INTRAMUSCULAR; INTRAVENOUS at 11:28

## 2025-07-09 RX ADMIN — ROCURONIUM BROMIDE 50 MG: 10 SOLUTION INTRAVENOUS at 08:30

## 2025-07-09 RX ADMIN — DEXAMETHASONE SODIUM PHOSPHATE 8 MG: 4 INJECTION, SOLUTION INTRAMUSCULAR; INTRAVENOUS at 08:36

## 2025-07-09 RX ADMIN — MIDAZOLAM 1 MG: 1 INJECTION INTRAMUSCULAR; INTRAVENOUS at 08:29

## 2025-07-09 RX ADMIN — HYDRALAZINE HYDROCHLORIDE 5 MG: 20 INJECTION INTRAMUSCULAR; INTRAVENOUS at 09:54

## 2025-07-09 RX ADMIN — HYDROMORPHONE HYDROCHLORIDE 1 MG: 1 INJECTION, SOLUTION INTRAMUSCULAR; INTRAVENOUS; SUBCUTANEOUS at 22:57

## 2025-07-09 RX ADMIN — SODIUM CHLORIDE: 9 INJECTION, SOLUTION INTRAVENOUS at 11:03

## 2025-07-09 RX ADMIN — SODIUM CHLORIDE 2000 MG: 9 INJECTION, SOLUTION INTRAVENOUS at 08:32

## 2025-07-09 RX ADMIN — FENTANYL CITRATE 50 MCG: 50 INJECTION INTRAMUSCULAR; INTRAVENOUS at 08:49

## 2025-07-09 RX ADMIN — METRONIDAZOLE 500 MG: 500 INJECTION, SOLUTION INTRAVENOUS at 08:40

## 2025-07-09 RX ADMIN — MIDAZOLAM 1 MG: 1 INJECTION INTRAMUSCULAR; INTRAVENOUS at 08:27

## 2025-07-09 RX ADMIN — PIPERACILLIN AND TAZOBACTAM 4500 MG: 4; .5 INJECTION, POWDER, FOR SOLUTION INTRAVENOUS at 17:04

## 2025-07-09 RX ADMIN — PROPOFOL 130 MG: 10 INJECTION, EMULSION INTRAVENOUS at 08:30

## 2025-07-09 ASSESSMENT — PAIN DESCRIPTION - DESCRIPTORS
DESCRIPTORS: ACHING;STABBING
DESCRIPTORS: ACHING;CRAMPING
DESCRIPTORS: ACHING
DESCRIPTORS: STABBING;ACHING
DESCRIPTORS: STABBING;ACHING
DESCRIPTORS: ACHING

## 2025-07-09 ASSESSMENT — PAIN SCALES - GENERAL
PAINLEVEL_OUTOF10: 10
PAINLEVEL_OUTOF10: 2
PAINLEVEL_OUTOF10: 0
PAINLEVEL_OUTOF10: 9
PAINLEVEL_OUTOF10: 8
PAINLEVEL_OUTOF10: 8
PAINLEVEL_OUTOF10: 6
PAINLEVEL_OUTOF10: 6
PAINLEVEL_OUTOF10: 8
PAINLEVEL_OUTOF10: 5
PAINLEVEL_OUTOF10: 7
PAINLEVEL_OUTOF10: 7

## 2025-07-09 ASSESSMENT — PAIN DESCRIPTION - LOCATION
LOCATION: ABDOMEN
LOCATION: SHOULDER
LOCATION: ABDOMEN
LOCATION: ABDOMEN

## 2025-07-09 ASSESSMENT — PAIN DESCRIPTION - FREQUENCY
FREQUENCY: CONTINUOUS

## 2025-07-09 ASSESSMENT — PAIN DESCRIPTION - ORIENTATION
ORIENTATION: MID
ORIENTATION: RIGHT;UPPER
ORIENTATION: MID
ORIENTATION: RIGHT
ORIENTATION: MID

## 2025-07-09 ASSESSMENT — PAIN DESCRIPTION - ONSET
ONSET: ON-GOING

## 2025-07-09 ASSESSMENT — PAIN - FUNCTIONAL ASSESSMENT
PAIN_FUNCTIONAL_ASSESSMENT: NONE - DENIES PAIN
PAIN_FUNCTIONAL_ASSESSMENT: PREVENTS OR INTERFERES SOME ACTIVE ACTIVITIES AND ADLS
PAIN_FUNCTIONAL_ASSESSMENT: ACTIVITIES ARE NOT PREVENTED
PAIN_FUNCTIONAL_ASSESSMENT: 0-10
PAIN_FUNCTIONAL_ASSESSMENT: PREVENTS OR INTERFERES SOME ACTIVE ACTIVITIES AND ADLS
PAIN_FUNCTIONAL_ASSESSMENT: NONE - DENIES PAIN

## 2025-07-09 ASSESSMENT — LIFESTYLE VARIABLES: SMOKING_STATUS: 1

## 2025-07-09 ASSESSMENT — PAIN DESCRIPTION - PAIN TYPE
TYPE: SURGICAL PAIN

## 2025-07-09 NOTE — PROGRESS NOTES
Pharmacy Note - Extended Infusion Adjustment(s)    Piperacillin/Tazobactam 3375mg Q8h for treatment of Intra-abdominal Infection. Per [facility] Extended Infusion Guidance Document, piperacillin/tazobactam will be changed to 4500mg loading dose followed by 3375mg Q8h extended infusion    Estimated Creatinine Clearance: Estimated Creatinine Clearance: 61 mL/min (based on SCr of 0.8 mg/dL).    BMI: Body mass index is 19.21 kg/m².          Please call with any questions.    Thank you,    Rigo Clarke, [facility]

## 2025-07-09 NOTE — ANESTHESIA PRE PROCEDURE
100 mL IVPB premix  500 mg IntraVENous On Call to OR Ashutosh Nunez MD           Allergies:  No Known Allergies    Problem List:    Patient Active Problem List   Diagnosis Code    Diverticulosis of intestine without bleeding K57.90    Hematuria R31.9    Liver mass R16.0    Adenoma, adrenal cortical D35.00    Cigarette nicotine dependence without complication F17.210    Diverticulosis of large intestine with hemorrhage K57.31    Recurrent major depressive disorder, in remission F33.40    Sepsis without acute organ dysfunction, due to unspecified organism (HCC) A41.9    Tobacco abuse Z72.0    Sigmoid diverticulitis K57.32    Leukocytosis D72.829    Pelvic abscess in female N73.9    Abscess of sigmoid colon due to diverticulitis K57.20    Chronic obstructive pulmonary disease (HCC) J44.9       Past Medical History:        Diagnosis Date    Adrenal mass     Aneurysm, cerebral     x2 in 2005    Arthritis     thumbs and knees    Cerebral artery occlusion with cerebral infarction (HCC)     x2 no residual deficients    Depression     Diverticulosis     Liver mass     Rectal bleed     Seizures (HCC)     patient unable to recall year--\"states years ago\"-mostly brought in by stress       Past Surgical History:        Procedure Laterality Date    BRAIN SURGERY      Aneurism surgery - 2 aneurisms repaired - 1 remains    BREAST SURGERY Right     biopsy    CT ABSCESS DRAINAGE SOFT TISSUE Right 05/29/2025    Posterior pelvic drain placement by Dr. Yoshi Tavarez. Drain is 10Fr x25cm in length    CT PERITONEAL/RETROPERITONEAL PERC DRAIN  05/29/2025    CT PERITONEAL/RETROPERITONEAL PERC DRAIN 5/29/2025 [facility] CT    TUBAL LIGATION         Social History:    Social History     Tobacco Use    Smoking status: Every Day     Current packs/day: 0.50     Average packs/day: 0.5 packs/day for 39.0 years (19.5 ttl pk-yrs)     Types: Cigarettes    Smokeless tobacco: Never   Substance Use Topics    Alcohol use: Not Currently     Alcohol/week:

## 2025-07-09 NOTE — OP NOTE
Operative Note      Patient: Piper Walker  YOB: 1960  MRN: [id number]    Date of Procedure: 7/9/2025    Pre-Op Diagnosis Codes:      * Sigmoid diverticulitis [K57.32]    Post-Op Diagnosis: Same       Procedure(s):  Robotic assisted laparoscopic sigmoid colectomy and flexible sigmoidoscopy    Surgeon(s):  Ashutosh Nunez MD    Assistant:   Surgical Assistant: Alonzo Childs    Anesthesia: General    Estimated Blood Loss (mL): less than 50     Complications: None    Specimens:   ID Type Source Tests Collected by Time Destination   A : A. SIGMOID COLON Tissue Tissue SURGICAL PATHOLOGY Ashutosh Nunez MD 7/9/2025 1003        Implants:  * No implants in log *      Drains:   Urinary Catheter 07/09/25 Dubose (Active)   Catheter Indications Perioperative use for selected surgical procedures 07/09/25 1141   Urine Color Yellow 07/09/25 1141   Urine Appearance Clear 07/09/25 1141   Collection Container Standard 07/09/25 1141   Catheter Best Practices  Drainage tube clipped to bed;Catheter secured to thigh;Tamper seal intact;Bag below bladder;Bag not on floor;Lack of dependent loop in tubing;Drainage bag less than half full 07/09/25 1141   Status Draining;Patent 07/09/25 1141       Findings:  Infection Present At Time Of Surgery (PATOS) (choose all levels that have infection present):  - Organ Space infection (below fascia) present as evidenced by pus and phlegmon  Other Findings: significant inflammation of sigmoid colon with dense adhesions, phlegmon, and pus between colon and anterior pelvic wall  This procedure was not performed to treat colon cancer through resection    Indication:  Ms. Walker had perforated diverticulitis with pelvis abscess requiring percutaneous drainage.  Subsequent CT imaging revealed ongoing diverticulitis with wall thickening along with concerns for possible fistula to small bowel.  The patient stated that over the past week, she began having vaginal bleeding which  has been intermittent.  Due to these findings, the risks, benefits, and alternatives were explained to the patient and she was willing to consent for the procedure.    Detailed Description of Procedure:   After informed consent was obtained, the patient was brought to the operating room and placed in the supine position.  General anesthesia was induced.  She was then placed in the lithotomy position.  A Dubose catheter was then placed.  She was then prepped and draped in usual sterile fashion.  Timeout was then performed.  We first started with a right periumbilical incision.  A penetrating towel clip was used to elevate the abdominal wall and a Veress needle was inserted in the abdominal cavity.  An 8 mm robotic trocar was then placed followed by the camera.  There is no evidence of injury with our initial Veress or trocar placement.  We then placed 2 additional 8 mm trocars in the left upper quadrant and a 12 mm trocar through a small wound protector in the right lower quadrant.  We also placed an 8 mm air seal in the right upper quadrant.  The patient was then moved into the Trendelenburg and right side down position.  The robot was then docked.  We then focused on the sigmoid colon.  Fortunately there were no small bowel adhesions to the sigmoid colon.  There were dense adhesions of the sigmoid colon to the anterior pelvis under the uterus.  These were taken down with a combination of blunt dissection along with electrocautery.  There is ongoing phlegmon along with pus consistent with possible colovaginal fistula.  Once we this was fully freed, the sigmoid colon was inspected and was found to be very redundant.  We were able to elevate the sigmoid colon and create a mesenteric window at the junction with the rectum with electrocautery.  A robotic 60 mm stapler with blue load was then used to divide the proximal rectum.  A vessel sealer was then used to divide the mesentery to the sigmoid colon up to an area of

## 2025-07-09 NOTE — H&P
Update History & Physical    The patient's History and Physical from Dr. Matthews today, July 9, 2025 was reviewed with the patient and I examined the patient. There was no change. The surgical site was confirmed by the patient and me.       Plan: The risks, benefits, expected outcome, and alternative to the recommended procedure have been discussed with the patient. Patient understands and wants to proceed with the procedure.  Will proceed with robotic assisted laparoscopic sigmoid colectomy, possible open, possible bowel resection.  Ancef, flagyl ordered.  Bilateral SCDs.  Dubose to be placed in the OR.    Electronically signed by Ashutosh Nunez MD on 7/9/2025 at 8:19 AM

## 2025-07-09 NOTE — PROGRESS NOTES
Pt to pacu sleeping.  Lap sites to abd x 5 intact with glue.  Ice applied.  Iv infusing.  FSBS-128.  Bilateral boots in place.

## 2025-07-09 NOTE — PROGRESS NOTES
4 Eyes Skin Assessment     NAME:  Piper Walker  YOB: 1960  MEDICAL RECORD NUMBER:  [id number]    The patient is being assessed for  Admission    I agree that at least one RN has performed a thorough Head to Toe Skin Assessment on the patient. ALL assessment sites listed below have been assessed.      Areas assessed by both nurses:    Head, Face, Ears, Shoulders, Back, Chest, Arms, Elbows, Hands, Sacrum. Buttock, Coccyx, Ischium, and Legs. Feet and Heels        Does the Patient have a Wound? No noted wound(s)       Jesus Prevention initiated by RN: No  Wound Care Orders initiated by RN: No    Pressure Injury (Stage 1,2,3,4, Unstageable, DTI, NWPT, and Complex wounds) if present, place Wound referral order by RN under : No    New Ostomies, if present place, Ostomy referral order under : No     Nurse 1 eSignature: Electronically signed by Flory Andrade RN on 7/9/25 at 5:56 PM EDT    **SHARE this note so that the co-signing nurse can place an eSignature**    Nurse 2 eSignature: Electronically signed by Serena Hansen RN on 7/9/25 at 6:03 PM EDT

## 2025-07-09 NOTE — BRIEF OP NOTE
Brief Postoperative Note      Patient: Piper Walker  YOB: 1960  MRN: [id number]    Date of Procedure: 7/9/2025    Pre-Op Diagnosis Codes:      * Sigmoid diverticulitis [K57.32]    Post-Op Diagnosis: Same       Procedure(s):  Robotic assisted laparoscopic sigmoid colectomy and flexible sigmoidoscopy    Surgeon(s):  Ashutosh Nunez MD    Assistant:  Surgical Assistant: Alonzo Childs    Anesthesia: General    Estimated Blood Loss (mL): less than 50 ml    Crystalloid: 1L     ml    Complications: None    Specimens:   ID Type Source Tests Collected by Time Destination   A : A. SIGMOID COLON Tissue Tissue SURGICAL PATHOLOGY Ashutosh Nunez MD 7/9/2025 1003        Implants:  * No implants in log *      Drains:   Urinary Catheter 07/09/25 Dubose (Active)       Findings:  Infection Present At Time Of Surgery (PATOS) (choose all levels that have infection present):  - Organ Space infection (below fascia) present as evidenced by phlegmon  Other Findings: phlegmon with possible colovaginal fistula, thickened sigmoid colon adherent to pelvis  This procedure was not performed to treat colon cancer through resection    Electronically signed by Ashutosh Nunez MD on 7/9/2025 at 11:02 AM

## 2025-07-09 NOTE — PROGRESS NOTES
Pt admitted to 4124. She's A&O X4 and stable. Pt C/O of abdominal pain of 10/10 and prn Dilaudid 1 mg given per order. Admission and skin assessment done on 5 lap sites noted to abd. SCD placed and IS given per order. Pt was oriented to room and use of call light. Bed in in low position and bed alarm is on. Table, call light and personal items are within reach. Will continue to monitor.

## 2025-07-09 NOTE — ANESTHESIA POSTPROCEDURE EVALUATION
Department of Anesthesiology  Postprocedure Note    Patient: Piper Walker  MRN: [id number]  YOB: 1960  Date of evaluation: 7/9/2025    Procedure Summary       Date: 07/09/25 Room / Location: [address]    Anesthesia Start: 0827 Anesthesia Stop: 1112    Procedure: Robotic assisted laparoscopic sigmoid colectomy and flexible sigmoidoscopy (Abdomen) Diagnosis:       Sigmoid diverticulitis      (Sigmoid diverticulitis [K57.32])    Surgeons: Ashutosh Nunez MD Responsible Provider: Quinn Matthews MD    Anesthesia Type: General ASA Status: 3            Anesthesia Type: General    Adwoa Phase I: Adwoa Score: 10    Adwoa Phase II:      Anesthesia Post Evaluation    Patient location during evaluation: PACU  Patient participation: complete - patient participated  Level of consciousness: awake and alert  Pain score: 0  Airway patency: patent  Nausea & Vomiting: no nausea and no vomiting  Cardiovascular status: blood pressure returned to baseline  Respiratory status: acceptable  Hydration status: euvolemic  Pain management: adequate    No notable events documented.

## 2025-07-10 LAB
ANION GAP SERPL CALCULATED.3IONS-SCNC: 9 MMOL/L (ref 3–16)
BASOPHILS # BLD: 0 K/UL (ref 0–0.2)
BASOPHILS NFR BLD: 0.3 %
BUN SERPL-MCNC: 11 MG/DL (ref 7–20)
CALCIUM SERPL-MCNC: 7.7 MG/DL (ref 8.3–10.6)
CHLORIDE SERPL-SCNC: 107 MMOL/L (ref 99–110)
CO2 SERPL-SCNC: 20 MMOL/L (ref 21–32)
CREAT SERPL-MCNC: 0.6 MG/DL (ref 0.6–1.2)
DEPRECATED RDW RBC AUTO: 18.1 % (ref 12.4–15.4)
EOSINOPHIL # BLD: 0 K/UL (ref 0–0.6)
EOSINOPHIL NFR BLD: 0.3 %
GFR SERPLBLD CREATININE-BSD FMLA CKD-EPI: >90 ML/MIN/{1.73_M2}
GLUCOSE SERPL-MCNC: 131 MG/DL (ref 70–99)
HCT VFR BLD AUTO: 36.4 % (ref 36–48)
HGB BLD-MCNC: 12 G/DL (ref 12–16)
LYMPHOCYTES # BLD: 1.8 K/UL (ref 1–5.1)
LYMPHOCYTES NFR BLD: 14.2 %
MAGNESIUM SERPL-MCNC: 1.72 MG/DL (ref 1.8–2.4)
MCH RBC QN AUTO: 29.6 PG (ref 26–34)
MCHC RBC AUTO-ENTMCNC: 33 G/DL (ref 31–36)
MCV RBC AUTO: 89.8 FL (ref 80–100)
MONOCYTES # BLD: 0.9 K/UL (ref 0–1.3)
MONOCYTES NFR BLD: 7.5 %
NEUTROPHILS # BLD: 9.9 K/UL (ref 1.7–7.7)
NEUTROPHILS NFR BLD: 77.7 %
PHOSPHATE SERPL-MCNC: 2.5 MG/DL (ref 2.5–4.9)
PLATELET # BLD AUTO: 364 K/UL (ref 135–450)
PMV BLD AUTO: 7.2 FL (ref 5–10.5)
POTASSIUM SERPL-SCNC: 3.8 MMOL/L (ref 3.5–5.1)
RBC # BLD AUTO: 4.06 M/UL (ref 4–5.2)
SODIUM SERPL-SCNC: 136 MMOL/L (ref 136–145)
WBC # BLD AUTO: 12.7 K/UL (ref 4–11)

## 2025-07-10 PROCEDURE — 6360000002 HC RX W HCPCS: Performed by: SURGERY

## 2025-07-10 PROCEDURE — 1200000000 HC SEMI PRIVATE

## 2025-07-10 PROCEDURE — 83735 ASSAY OF MAGNESIUM: CPT

## 2025-07-10 PROCEDURE — 6370000000 HC RX 637 (ALT 250 FOR IP): Performed by: SURGERY

## 2025-07-10 PROCEDURE — APPNB15 APP NON BILLABLE TIME 0-15 MINS: Performed by: PHYSICIAN ASSISTANT

## 2025-07-10 PROCEDURE — 84100 ASSAY OF PHOSPHORUS: CPT

## 2025-07-10 PROCEDURE — 85025 COMPLETE CBC W/AUTO DIFF WBC: CPT

## 2025-07-10 PROCEDURE — 2580000003 HC RX 258: Performed by: SURGERY

## 2025-07-10 PROCEDURE — 94760 N-INVAS EAR/PLS OXIMETRY 1: CPT

## 2025-07-10 PROCEDURE — 2500000003 HC RX 250 WO HCPCS: Performed by: SURGERY

## 2025-07-10 PROCEDURE — 80048 BASIC METABOLIC PNL TOTAL CA: CPT

## 2025-07-10 PROCEDURE — 36415 COLL VENOUS BLD VENIPUNCTURE: CPT

## 2025-07-10 PROCEDURE — 99024 POSTOP FOLLOW-UP VISIT: CPT | Performed by: SURGERY

## 2025-07-10 PROCEDURE — APPSS15 APP SPLIT SHARED TIME 0-15 MINUTES: Performed by: PHYSICIAN ASSISTANT

## 2025-07-10 RX ADMIN — OXYCODONE HYDROCHLORIDE 10 MG: 10 TABLET ORAL at 18:45

## 2025-07-10 RX ADMIN — ENOXAPARIN SODIUM 40 MG: 100 INJECTION SUBCUTANEOUS at 08:28

## 2025-07-10 RX ADMIN — HYDROMORPHONE HYDROCHLORIDE 0.5 MG: 1 INJECTION, SOLUTION INTRAMUSCULAR; INTRAVENOUS; SUBCUTANEOUS at 13:05

## 2025-07-10 RX ADMIN — PANTOPRAZOLE SODIUM 40 MG: 40 TABLET, DELAYED RELEASE ORAL at 06:44

## 2025-07-10 RX ADMIN — ONDANSETRON 4 MG: 2 INJECTION, SOLUTION INTRAMUSCULAR; INTRAVENOUS at 13:05

## 2025-07-10 RX ADMIN — ONDANSETRON 4 MG: 4 TABLET, ORALLY DISINTEGRATING ORAL at 21:18

## 2025-07-10 RX ADMIN — ACETAMINOPHEN 1000 MG: 500 TABLET ORAL at 14:51

## 2025-07-10 RX ADMIN — SENNOSIDES AND DOCUSATE SODIUM 1 TABLET: 8.6; 5 TABLET ORAL at 08:28

## 2025-07-10 RX ADMIN — PIPERACILLIN AND TAZOBACTAM 3375 MG: 3; .375 INJECTION, POWDER, LYOPHILIZED, FOR SOLUTION INTRAVENOUS at 06:46

## 2025-07-10 RX ADMIN — SENNOSIDES AND DOCUSATE SODIUM 1 TABLET: 8.6; 5 TABLET ORAL at 21:18

## 2025-07-10 RX ADMIN — ACETAMINOPHEN 1000 MG: 500 TABLET ORAL at 21:18

## 2025-07-10 RX ADMIN — HYDROMORPHONE HYDROCHLORIDE 0.5 MG: 1 INJECTION, SOLUTION INTRAMUSCULAR; INTRAVENOUS; SUBCUTANEOUS at 06:43

## 2025-07-10 RX ADMIN — PIPERACILLIN AND TAZOBACTAM 3375 MG: 3; .375 INJECTION, POWDER, LYOPHILIZED, FOR SOLUTION INTRAVENOUS at 14:51

## 2025-07-10 RX ADMIN — ACETAMINOPHEN 1000 MG: 500 TABLET ORAL at 06:43

## 2025-07-10 RX ADMIN — OXYCODONE HYDROCHLORIDE 10 MG: 10 TABLET ORAL at 03:51

## 2025-07-10 RX ADMIN — DEXTROSE, SODIUM CHLORIDE, AND POTASSIUM CHLORIDE: 5; .45; .15 INJECTION INTRAVENOUS at 15:10

## 2025-07-10 ASSESSMENT — PAIN DESCRIPTION - DESCRIPTORS
DESCRIPTORS: THROBBING
DESCRIPTORS: ACHING;STABBING
DESCRIPTORS: ACHING
DESCRIPTORS: THROBBING
DESCRIPTORS: ACHING
DESCRIPTORS: THROBBING

## 2025-07-10 ASSESSMENT — PAIN SCALES - GENERAL
PAINLEVEL_OUTOF10: 5
PAINLEVEL_OUTOF10: 7
PAINLEVEL_OUTOF10: 6
PAINLEVEL_OUTOF10: 0
PAINLEVEL_OUTOF10: 4
PAINLEVEL_OUTOF10: 5
PAINLEVEL_OUTOF10: 7
PAINLEVEL_OUTOF10: 8

## 2025-07-10 ASSESSMENT — PAIN DESCRIPTION - PAIN TYPE
TYPE: SURGICAL PAIN

## 2025-07-10 ASSESSMENT — PAIN DESCRIPTION - ORIENTATION
ORIENTATION: RIGHT
ORIENTATION: RIGHT;MID
ORIENTATION: RIGHT;MID
ORIENTATION: RIGHT;LOWER
ORIENTATION: RIGHT
ORIENTATION: RIGHT;LOWER

## 2025-07-10 ASSESSMENT — PAIN DESCRIPTION - FREQUENCY
FREQUENCY: CONTINUOUS

## 2025-07-10 ASSESSMENT — PAIN - FUNCTIONAL ASSESSMENT
PAIN_FUNCTIONAL_ASSESSMENT: ACTIVITIES ARE NOT PREVENTED

## 2025-07-10 ASSESSMENT — PAIN DESCRIPTION - LOCATION
LOCATION: ABDOMEN

## 2025-07-10 ASSESSMENT — PAIN DESCRIPTION - ONSET
ONSET: ON-GOING

## 2025-07-10 ASSESSMENT — PAIN SCALES - WONG BAKER: WONGBAKER_NUMERICALRESPONSE: HURTS WHOLE LOT

## 2025-07-10 NOTE — CARE COORDINATION
Case Management Assessment  Initial Evaluation    Date/Time of Evaluation: 7/10/2025 2:13 PM  Assessment Completed by: Kelsey Pan RN    If patient is discharged prior to next notation, then this note serves as note for discharge by case management.    Patient Name: Piper Walker                   YOB: 1960  Diagnosis: Sigmoid diverticulitis [K57.32]  Abscess of sigmoid colon due to diverticulitis [K57.20]                   Date / Time: 7/9/2025  7:00 AM    Patient Admission Status: Inpatient   Readmission Risk (Low < 19, Mod (19-27), High > 27): Readmission Risk Score: 17.1    Current PCP: Tata Loredo, SHAZIA - CNP  PCP verified by CM? Yes    Chart Reviewed: Yes      History Provided by: Patient  Patient Orientation: Alert and Oriented    Patient Cognition: Alert    Hospitalization in the last 30 days (Readmission):  No    If yes, Readmission Assessment in CM Navigator will be completed.    Advance Directives:      Code Status: Full Code   Patient's Primary Decision Maker is: Legal Next of Kin    Primary Decision Maker: Jesusita James - Child - [phone]    Discharge Planning:    Patient lives with: Spouse/Significant Other Type of Home: House  Primary Care Giver: Self  Patient Support Systems include: Spouse/Significant Other, Children   Current Financial resources: Medicaid, Other (Comment) (has been screend, does not qualify for Medicaid)  Current community resources: None  Current services prior to admission: None            Current DME:              Type of Home Care services:  None    ADLS  Prior functional level: Independent in ADLs/IADLs  Current functional level: Independent in ADLs/IADLs    PT AM-PAC:   /24  OT AM-PAC:   /24    Family can provide assistance at DC: Yes  Would you like Case Management to discuss the discharge plan with any other family members/significant others, and if so, who? Yes (daughter Jesusita)  Plans to Return to Present Housing: Yes  Other Identified Issues/Barriers  to RETURNING to current housing: none at this time  Potential Assistance needed at discharge: Prescription Assistance            Potential DME:    Patient expects to discharge to: House  Plan for transportation at discharge:      Financial    Payor: /     Does insurance require precert for SNF: No    Potential assistance Purchasing Medications: Yes (does not have insurance)  Meds-to-Beds request: Yes      [facility] [id number] - [place], [place] - [address] [place] [address] - P [phone] - F [phone] [address] [place] [address]  Phone: [phone] Fax: [phone]      Notes:    Factors facilitating achievement of predicted outcomes: Family support, Friend support, Motivated, Cooperative, and Pleasant    Barriers to discharge: No insurance coverage    Additional Case Management Notes: Patient presented for a scheduled procedure. At this time, patient has no insurance. Daughter noted that they have had referrals to [facility] in the past. Plan is to d/c home with family support. Meds will need to go to retail prior to d/c. CM informed patient/family that pain medications may have a small co-pay.    The Plan for Transition of Care is related to the following treatment goals of Sigmoid diverticulitis [K57.32]  Abscess of sigmoid colon due to diverticulitis [K57.20]    IF APPLICABLE: The Patient and/or patient representative Piper and her family were provided with a choice of provider and agrees with the discharge plan. Freedom of choice list with basic dialogue that supports the patient's individualized plan of care/goals and shares the quality data associated with the providers was provided to:     Patient Representative Name:       The Patient and/or Patient Representative Agree with the Discharge Plan?      Kelsey Pan RN  Case Management Department  Ph: [phone] Fax: [phone]

## 2025-07-10 NOTE — PLAN OF CARE
Problem: Discharge Planning  Goal: Discharge to home or other facility with appropriate resources  Outcome: Progressing     Problem: Safety - Adult  Goal: Free from fall injury  Outcome: Progressing     Problem: Pain  Goal: Verbalizes/displays adequate comfort level or baseline comfort level  7/10/2025 0452 by Myrna Bhagat, RN  Outcome: Progressing     Problem: Chronic Conditions and Co-morbidities  Goal: Patient's chronic conditions and co-morbidity symptoms are monitored and maintained or improved  Outcome: Progressing

## 2025-07-10 NOTE — PROGRESS NOTES
Pt AO x4. VSS. Pt rates pain 4/10 pt is declining pain medication at this time. Morning medications given whole, tolerated well. Morning assessment completed. Pt lap sites x5 are clean dry and intact. Pt turns self in bed, and is SBA. Pt has no questions or concerns. Pt calls appropriately, bed in lowest and locked position with call light in reach.    Electronically signed by Rosie Mcginnis RN on 7/10/2025 at 10:52 AM

## 2025-07-10 NOTE — PROGRESS NOTES
General and Vascular Surgery                                                           Daily Progress Note                                                             Juanpablo Rae PA-C    Pt Name: Piper Walker  Medical Record Number: [id number]  Date of Birth 1960   Today's Date: 7/10/2025    ASSESSMENT/PLAN  S/P (7/9/25): Robotic assisted laparoscopic sigmoid colectomy and flexible sigmoidoscopy   Pain controlled, Most pain in right upper abdomen  WBC count 12.7  Denies any nausea or emesis  Denies any flatulence or Bm's  +Belching  Tolerating full liquid diet. Continue diet until symptoms improve  OOB and ambulate    EDUCATION  Patient educated about their illness/diagnosis, stated above, and all questions answered. We discussed the importance of nutrition, medications they are taking, and healthy lifestyle.  SUBJECTIVE  Piper has improved from yesterday. Pain is well controlled. She has no nausea and no vomiting.  She has not passed flatus and has not had a bowel movement. She is tolerating full liquids. Current activity is ad cathy    OBJECTIVE  VITALS:  height is 1.676 m (5' 6\") and weight is 56.5 kg (124 lb 9 oz). Her oral temperature is 97.9 °F (36.6 °C). Her blood pressure is 131/61 and her pulse is 63. Her respiration is 16 and oxygen saturation is 95%. VITALS:  /61   Pulse 63   Temp 97.9 °F (36.6 °C) (Oral)   Resp 16   Ht 1.676 m (5' 6\")   Wt 56.5 kg (124 lb 9 oz)   SpO2 95%   BMI 20.10 kg/m²   GENERAL: alert, no distress  LUNGS: clear to ausculation, without wheezes, rales or rhonci  HEART: normal rate and regular rhythm  ABDOMEN: soft, incisional tenderness, and some RUQ tenderness, non-distended  EXTREMITY: no cyanosis, clubbing or edema  I/O last 3 completed shifts:  In: 1510 [P.O.:360; I.V.:1000; IV Piggyback:150]  Out: 2000 [Urine:1950; Blood:50]  I/O this shift:  In: -   Out: 200 [Urine:200]    LABS  Recent Labs

## 2025-07-10 NOTE — PROGRESS NOTES
Kelsey removed per orders, pt was ready and asking for the kelsey to be removed.     Electronically signed by Rosie Mcginnis RN on 7/10/2025 at 8:29 AM

## 2025-07-10 NOTE — PLAN OF CARE
Problem: Discharge Planning  Goal: Discharge to home or other facility with appropriate resources  7/10/2025 1048 by Rosie Mcginnis RN  Outcome: Progressing  7/10/2025 0452 by Myrna Bhagat RN  Outcome: Progressing     Problem: Safety - Adult  Goal: Free from fall injury  7/10/2025 1048 by Rosie Mcginnis RN  Outcome: Progressing  7/10/2025 0452 by Myrna Bhagat RN  Outcome: Progressing     Problem: Pain  Goal: Verbalizes/displays adequate comfort level or baseline comfort level  7/10/2025 1048 by Rosie Mcginnis RN  Outcome: Progressing  7/10/2025 0452 by Myrna Bhagat RN  Outcome: Progressing     Problem: Chronic Conditions and Co-morbidities  Goal: Patient's chronic conditions and co-morbidity symptoms are monitored and maintained or improved  7/10/2025 1048 by Rosie Mcginnis RN  Outcome: Progressing  7/10/2025 0452 by Myrna Bhagat, RN  Outcome: Progressing

## 2025-07-10 NOTE — PROGRESS NOTES
Patient c/o right shoulder pain and abd pain. Explained to patient it is possible to have referred pain after surgery. 1mg dilaudid given with tylenol and patient immediatly felt nauseous and dry heaved so PRN Zofran was given and patient stated relief.

## 2025-07-11 LAB
ANION GAP SERPL CALCULATED.3IONS-SCNC: 8 MMOL/L (ref 3–16)
BASOPHILS # BLD: 0.1 K/UL (ref 0–0.2)
BASOPHILS NFR BLD: 0.7 %
BUN SERPL-MCNC: 8 MG/DL (ref 7–20)
CALCIUM SERPL-MCNC: 8 MG/DL (ref 8.3–10.6)
CHLORIDE SERPL-SCNC: 104 MMOL/L (ref 99–110)
CO2 SERPL-SCNC: 23 MMOL/L (ref 21–32)
CREAT SERPL-MCNC: 0.7 MG/DL (ref 0.6–1.2)
DEPRECATED RDW RBC AUTO: 17.9 % (ref 12.4–15.4)
EOSINOPHIL # BLD: 0.4 K/UL (ref 0–0.6)
EOSINOPHIL NFR BLD: 4.6 %
GFR SERPLBLD CREATININE-BSD FMLA CKD-EPI: >90 ML/MIN/{1.73_M2}
GLUCOSE SERPL-MCNC: 101 MG/DL (ref 70–99)
HCT VFR BLD AUTO: 35.7 % (ref 36–48)
HGB BLD-MCNC: 11.7 G/DL (ref 12–16)
LYMPHOCYTES # BLD: 1.4 K/UL (ref 1–5.1)
LYMPHOCYTES NFR BLD: 16.2 %
MAGNESIUM SERPL-MCNC: 1.67 MG/DL (ref 1.8–2.4)
MCH RBC QN AUTO: 29.7 PG (ref 26–34)
MCHC RBC AUTO-ENTMCNC: 32.8 G/DL (ref 31–36)
MCV RBC AUTO: 90.5 FL (ref 80–100)
MONOCYTES # BLD: 0.8 K/UL (ref 0–1.3)
MONOCYTES NFR BLD: 9.4 %
NEUTROPHILS # BLD: 6.1 K/UL (ref 1.7–7.7)
NEUTROPHILS NFR BLD: 69.1 %
PHOSPHATE SERPL-MCNC: 1.9 MG/DL (ref 2.5–4.9)
PLATELET # BLD AUTO: 337 K/UL (ref 135–450)
PMV BLD AUTO: 7.3 FL (ref 5–10.5)
POTASSIUM SERPL-SCNC: 3.6 MMOL/L (ref 3.5–5.1)
RBC # BLD AUTO: 3.95 M/UL (ref 4–5.2)
SODIUM SERPL-SCNC: 135 MMOL/L (ref 136–145)
WBC # BLD AUTO: 8.9 K/UL (ref 4–11)

## 2025-07-11 PROCEDURE — APPSS15 APP SPLIT SHARED TIME 0-15 MINUTES: Performed by: PHYSICIAN ASSISTANT

## 2025-07-11 PROCEDURE — APPNB15 APP NON BILLABLE TIME 0-15 MINS: Performed by: PHYSICIAN ASSISTANT

## 2025-07-11 PROCEDURE — 6360000002 HC RX W HCPCS: Performed by: SURGERY

## 2025-07-11 PROCEDURE — 36415 COLL VENOUS BLD VENIPUNCTURE: CPT

## 2025-07-11 PROCEDURE — 1200000000 HC SEMI PRIVATE

## 2025-07-11 PROCEDURE — 6370000000 HC RX 637 (ALT 250 FOR IP): Performed by: SURGERY

## 2025-07-11 PROCEDURE — 83735 ASSAY OF MAGNESIUM: CPT

## 2025-07-11 PROCEDURE — 94761 N-INVAS EAR/PLS OXIMETRY MLT: CPT

## 2025-07-11 PROCEDURE — 84100 ASSAY OF PHOSPHORUS: CPT

## 2025-07-11 PROCEDURE — 2580000003 HC RX 258: Performed by: SURGERY

## 2025-07-11 PROCEDURE — 80048 BASIC METABOLIC PNL TOTAL CA: CPT

## 2025-07-11 PROCEDURE — 85025 COMPLETE CBC W/AUTO DIFF WBC: CPT

## 2025-07-11 PROCEDURE — 2500000003 HC RX 250 WO HCPCS: Performed by: SURGERY

## 2025-07-11 PROCEDURE — 99024 POSTOP FOLLOW-UP VISIT: CPT | Performed by: SURGERY

## 2025-07-11 RX ORDER — KETOROLAC TROMETHAMINE 15 MG/ML
15 INJECTION, SOLUTION INTRAMUSCULAR; INTRAVENOUS EVERY 6 HOURS
Status: DISCONTINUED | OUTPATIENT
Start: 2025-07-11 | End: 2025-07-12 | Stop reason: HOSPADM

## 2025-07-11 RX ORDER — OXYCODONE AND ACETAMINOPHEN 5; 325 MG/1; MG/1
1-2 TABLET ORAL EVERY 6 HOURS PRN
Qty: 28 TABLET | Refills: 0 | Status: SHIPPED | OUTPATIENT
Start: 2025-07-11 | End: 2025-07-18

## 2025-07-11 RX ADMIN — HYDROMORPHONE HYDROCHLORIDE 1 MG: 1 INJECTION, SOLUTION INTRAMUSCULAR; INTRAVENOUS; SUBCUTANEOUS at 00:07

## 2025-07-11 RX ADMIN — ACETAMINOPHEN 1000 MG: 500 TABLET ORAL at 20:58

## 2025-07-11 RX ADMIN — MAGNESIUM SULFATE HEPTAHYDRATE 2000 MG: 40 INJECTION, SOLUTION INTRAVENOUS at 12:56

## 2025-07-11 RX ADMIN — KETOROLAC TROMETHAMINE 15 MG: 15 INJECTION, SOLUTION INTRAMUSCULAR; INTRAVENOUS at 20:57

## 2025-07-11 RX ADMIN — SENNOSIDES AND DOCUSATE SODIUM 1 TABLET: 8.6; 5 TABLET ORAL at 20:58

## 2025-07-11 RX ADMIN — ACETAMINOPHEN 1000 MG: 500 TABLET ORAL at 14:32

## 2025-07-11 RX ADMIN — PIPERACILLIN AND TAZOBACTAM 3375 MG: 3; .375 INJECTION, POWDER, LYOPHILIZED, FOR SOLUTION INTRAVENOUS at 00:13

## 2025-07-11 RX ADMIN — SODIUM CHLORIDE, PRESERVATIVE FREE 10 ML: 5 INJECTION INTRAVENOUS at 08:50

## 2025-07-11 RX ADMIN — ENOXAPARIN SODIUM 40 MG: 100 INJECTION SUBCUTANEOUS at 08:49

## 2025-07-11 RX ADMIN — PIPERACILLIN AND TAZOBACTAM 3375 MG: 3; .375 INJECTION, POWDER, LYOPHILIZED, FOR SOLUTION INTRAVENOUS at 08:49

## 2025-07-11 RX ADMIN — OXYCODONE HYDROCHLORIDE 10 MG: 10 TABLET ORAL at 11:14

## 2025-07-11 RX ADMIN — KETOROLAC TROMETHAMINE 15 MG: 15 INJECTION, SOLUTION INTRAMUSCULAR; INTRAVENOUS at 15:41

## 2025-07-11 RX ADMIN — OXYCODONE HYDROCHLORIDE 10 MG: 10 TABLET ORAL at 05:21

## 2025-07-11 RX ADMIN — DEXTROSE, SODIUM CHLORIDE, AND POTASSIUM CHLORIDE: 5; .45; .15 INJECTION INTRAVENOUS at 18:30

## 2025-07-11 RX ADMIN — SODIUM CHLORIDE, PRESERVATIVE FREE 10 ML: 5 INJECTION INTRAVENOUS at 20:59

## 2025-07-11 RX ADMIN — DEXTROSE, SODIUM CHLORIDE, AND POTASSIUM CHLORIDE: 5; .45; .15 INJECTION INTRAVENOUS at 00:13

## 2025-07-11 RX ADMIN — PIPERACILLIN AND TAZOBACTAM 3375 MG: 3; .375 INJECTION, POWDER, LYOPHILIZED, FOR SOLUTION INTRAVENOUS at 15:41

## 2025-07-11 RX ADMIN — PANTOPRAZOLE SODIUM 40 MG: 40 TABLET, DELAYED RELEASE ORAL at 05:22

## 2025-07-11 RX ADMIN — HYDROMORPHONE HYDROCHLORIDE 1 MG: 1 INJECTION, SOLUTION INTRAMUSCULAR; INTRAVENOUS; SUBCUTANEOUS at 13:13

## 2025-07-11 RX ADMIN — HYDROMORPHONE HYDROCHLORIDE 1 MG: 1 INJECTION, SOLUTION INTRAMUSCULAR; INTRAVENOUS; SUBCUTANEOUS at 08:50

## 2025-07-11 RX ADMIN — SENNOSIDES AND DOCUSATE SODIUM 1 TABLET: 8.6; 5 TABLET ORAL at 08:50

## 2025-07-11 ASSESSMENT — PAIN DESCRIPTION - LOCATION
LOCATION: ABDOMEN

## 2025-07-11 ASSESSMENT — PAIN SCALES - GENERAL
PAINLEVEL_OUTOF10: 8
PAINLEVEL_OUTOF10: 7
PAINLEVEL_OUTOF10: 8
PAINLEVEL_OUTOF10: 0
PAINLEVEL_OUTOF10: 8
PAINLEVEL_OUTOF10: 7
PAINLEVEL_OUTOF10: 9
PAINLEVEL_OUTOF10: 8
PAINLEVEL_OUTOF10: 10
PAINLEVEL_OUTOF10: 4

## 2025-07-11 ASSESSMENT — PAIN - FUNCTIONAL ASSESSMENT
PAIN_FUNCTIONAL_ASSESSMENT: ACTIVITIES ARE NOT PREVENTED

## 2025-07-11 ASSESSMENT — PAIN DESCRIPTION - DESCRIPTORS
DESCRIPTORS: THROBBING;PRESSURE
DESCRIPTORS: THROBBING
DESCRIPTORS: THROBBING
DESCRIPTORS: PRESSURE;ACHING

## 2025-07-11 ASSESSMENT — PAIN DESCRIPTION - ONSET
ONSET: ON-GOING

## 2025-07-11 ASSESSMENT — PAIN DESCRIPTION - ORIENTATION
ORIENTATION: RIGHT;LOWER
ORIENTATION: RIGHT
ORIENTATION: RIGHT;MID
ORIENTATION: ANTERIOR
ORIENTATION: RIGHT;LOWER

## 2025-07-11 ASSESSMENT — PAIN SCALES - WONG BAKER
WONGBAKER_NUMERICALRESPONSE: NO HURT
WONGBAKER_NUMERICALRESPONSE: NO HURT

## 2025-07-11 ASSESSMENT — PAIN DESCRIPTION - PAIN TYPE
TYPE: SURGICAL PAIN

## 2025-07-11 ASSESSMENT — PAIN DESCRIPTION - FREQUENCY
FREQUENCY: CONTINUOUS

## 2025-07-11 NOTE — PROGRESS NOTES
CLINICAL PHARMACY NOTE: MEDS TO BEDS    Discharge medications are ready in inpatient pharmacy for weekend delivery.    07/11/25 3:42 PM

## 2025-07-11 NOTE — PROGRESS NOTES
Pt reports passing gas and blood came out phone call to dr elam made. Pain is also uncontrolled at this time. MD made aware.       Electronically signed by Rosie Mcginnis RN on 7/11/2025 at 2:35 PM

## 2025-07-11 NOTE — PROGRESS NOTES
General and Vascular Surgery                                                           Daily Progress Note                                                             Juanpablo Rae PA-C    Pt Name: Piper Walker  Medical Record Number: [id number]  Date of Birth 1960   Today's Date: 7/11/2025    ASSESSMENT/PLAN  S/P (7/9/25): Robotic assisted laparoscopic sigmoid colectomy and flexible sigmoidoscopy   Pain controlled, Most pain in right upper abdomen  WBC count 12.7-->8.9  Denies any nausea or emesis  +flatulence and small BM  Tolerating full liquid diet. Will advance diet to regular as tolerated  OOB and ambulate    EDUCATION  Patient educated about their illness/diagnosis, stated above, and all questions answered. We discussed the importance of nutrition, medications they are taking, and healthy lifestyle.  SUBJECTIVE  Piper has improved from yesterday. Pain is well controlled. She has occasional nausea and small emesis overnight-feeling better now.  She has not passed flatus and has not had a bowel movement. She is tolerating full liquids. Current activity is ad cathy    OBJECTIVE  VITALS:  height is 1.676 m (5' 5.98\") and weight is 53.1 kg (117 lb 1 oz). Her oral temperature is 97.3 °F (36.3 °C). Her blood pressure is 152/92 (abnormal) and her pulse is 63. Her respiration is 17 and oxygen saturation is 92%. VITALS:  BP (!) 152/92   Pulse 63   Temp 97.3 °F (36.3 °C) (Oral)   Resp 17   Ht 1.676 m (5' 5.98\")   Wt 53.1 kg (117 lb 1 oz)   SpO2 92%   BMI 18.90 kg/m²   GENERAL: alert, no distress  LUNGS: clear to ausculation, without wheezes, rales or rhonci  HEART: normal rate and regular rhythm  ABDOMEN: soft, incisional tenderness, non-distended  EXTREMITY: no cyanosis, clubbing or edema  I/O last 3 completed shifts:  In: 980 [P.O.:980]  Out: 1000 [Urine:1000]  No intake/output data recorded.    LABS  Recent Labs     07/11/25  0742   WBC 8.9   HGB

## 2025-07-11 NOTE — DISCHARGE INSTRUCTIONS
Ashutosh Nunez MD     General and Vascular Surgery   Jamari Farrar MD     [address] [facility]   Kulwinder Taveras MD     [address]  Juanp[place] [address]        Phone: [phone]           Fax: [phone]                                                         POST-OPERATIVE INSTRUCTIONS FOR ROBOTIC COLECTOMY    Call the office to schedule your post-operative appointment with your surgeon for two (2) weeks.     You will have water occlusive glue closing your incisions.    You may shower.  Wash incisions gently, and pat them dry. Do not rub your incisions.  Do not soak incisions in tub baths, hot tubs or pools    General guidelines for activity:  Avoid strenuous activity or lifting anything heavier than 15 pounds for 4-6 weeks.   It is OK to be up walking around; walking up and down stairs is also OK.   Do what is comfortable: stop and rest when you feel tired.     Drink plenty of fluids and stay on a bland diet for 2-3 days after surgery.     Do NOT drive for one week and while taking your narcotic pain medicine.     Watch for signs of infection:   Fever over 100.5°   Excessive warmth or bright redness around your incisions  Leakage of bloody or cloudy fluid from your incisions    During the laparoscopic procedure that you had, gas is pumped into the abdominal cavity.  You may feel abdominal, shoulder, or rib pain for a few days due to this.    You will have pain medicine ordered. Take as directed.    If you experience constipation  Increase your water intake, and activity; walking is best.  A stool softener or mild laxative may be necessary if you still have not had a bowel  movement ; call the office for further instructions.

## 2025-07-11 NOTE — PLAN OF CARE
Problem: Discharge Planning  Goal: Discharge to home or other facility with appropriate resources  Outcome: Progressing     Problem: Safety - Adult  Goal: Free from fall injury  7/11/2025 1020 by Rosie Mcginnis RN  Outcome: Progressing  7/11/2025 0447 by Alexandra Thompson, RN  Flowsheets (Taken 7/11/2025 0447)  Free From Fall Injury:   Instruct family/caregiver on patient safety   Based on caregiver fall risk screen, instruct family/caregiver to ask for assistance with transferring infant if caregiver noted to have fall risk factors     Problem: Pain  Goal: Verbalizes/displays adequate comfort level or baseline comfort level  Outcome: Progressing     Problem: Chronic Conditions and Co-morbidities  Goal: Patient's chronic conditions and co-morbidity symptoms are monitored and maintained or improved  Outcome: Progressing

## 2025-07-11 NOTE — PLAN OF CARE
Problem: Safety - Adult  Goal: Free from fall injury  Flowsheets (Taken 7/11/2025 9925)  Free From Fall Injury:   Instruct family/caregiver on patient safety   Based on caregiver fall risk screen, instruct family/caregiver to ask for assistance with transferring infant if caregiver noted to have fall risk factors

## 2025-07-11 NOTE — PROGRESS NOTES
Pt Ao x4. VSS. Pt has 8/10 pain this AM. Morning medications given whole, tolerated well. Morning assessment completed. ABD incisions clean, dry and intact. Pt is UAL, and turns self. Pt has no questions or concerns. Standard safety measures in place.     Electronically signed by Rosie Mcginnis RN on 7/11/2025 at 10:22 AM

## 2025-07-12 VITALS
DIASTOLIC BLOOD PRESSURE: 71 MMHG | RESPIRATION RATE: 18 BRPM | BODY MASS INDEX: 18.8 KG/M2 | WEIGHT: 117 LBS | HEIGHT: 66 IN | HEART RATE: 52 BPM | TEMPERATURE: 97.7 F | OXYGEN SATURATION: 99 % | SYSTOLIC BLOOD PRESSURE: 163 MMHG

## 2025-07-12 PROCEDURE — 99024 POSTOP FOLLOW-UP VISIT: CPT | Performed by: STUDENT IN AN ORGANIZED HEALTH CARE EDUCATION/TRAINING PROGRAM

## 2025-07-12 PROCEDURE — 2580000003 HC RX 258: Performed by: SURGERY

## 2025-07-12 PROCEDURE — 6370000000 HC RX 637 (ALT 250 FOR IP): Performed by: SURGERY

## 2025-07-12 PROCEDURE — 6360000002 HC RX W HCPCS: Performed by: SURGERY

## 2025-07-12 PROCEDURE — 94760 N-INVAS EAR/PLS OXIMETRY 1: CPT

## 2025-07-12 PROCEDURE — 2500000003 HC RX 250 WO HCPCS: Performed by: SURGERY

## 2025-07-12 RX ADMIN — SODIUM CHLORIDE, PRESERVATIVE FREE 10 ML: 5 INJECTION INTRAVENOUS at 08:17

## 2025-07-12 RX ADMIN — PANTOPRAZOLE SODIUM 40 MG: 40 TABLET, DELAYED RELEASE ORAL at 05:44

## 2025-07-12 RX ADMIN — HYDROMORPHONE HYDROCHLORIDE 1 MG: 1 INJECTION, SOLUTION INTRAMUSCULAR; INTRAVENOUS; SUBCUTANEOUS at 00:48

## 2025-07-12 RX ADMIN — OXYCODONE 5 MG: 5 TABLET ORAL at 12:00

## 2025-07-12 RX ADMIN — PIPERACILLIN AND TAZOBACTAM 3375 MG: 3; .375 INJECTION, POWDER, LYOPHILIZED, FOR SOLUTION INTRAVENOUS at 07:19

## 2025-07-12 RX ADMIN — SENNOSIDES AND DOCUSATE SODIUM 1 TABLET: 8.6; 5 TABLET ORAL at 08:10

## 2025-07-12 RX ADMIN — KETOROLAC TROMETHAMINE 15 MG: 15 INJECTION, SOLUTION INTRAMUSCULAR; INTRAVENOUS at 08:12

## 2025-07-12 RX ADMIN — ENOXAPARIN SODIUM 40 MG: 100 INJECTION SUBCUTANEOUS at 08:10

## 2025-07-12 RX ADMIN — PIPERACILLIN AND TAZOBACTAM 3375 MG: 3; .375 INJECTION, POWDER, LYOPHILIZED, FOR SOLUTION INTRAVENOUS at 00:50

## 2025-07-12 RX ADMIN — OXYCODONE HYDROCHLORIDE 10 MG: 10 TABLET ORAL at 05:42

## 2025-07-12 ASSESSMENT — PAIN DESCRIPTION - FREQUENCY: FREQUENCY: CONTINUOUS

## 2025-07-12 ASSESSMENT — PAIN SCALES - GENERAL
PAINLEVEL_OUTOF10: 0
PAINLEVEL_OUTOF10: 0
PAINLEVEL_OUTOF10: 6
PAINLEVEL_OUTOF10: 0
PAINLEVEL_OUTOF10: 5
PAINLEVEL_OUTOF10: 10
PAINLEVEL_OUTOF10: 10
PAINLEVEL_OUTOF10: 0

## 2025-07-12 ASSESSMENT — PAIN DESCRIPTION - ONSET: ONSET: ON-GOING

## 2025-07-12 ASSESSMENT — PAIN SCALES - WONG BAKER
WONGBAKER_NUMERICALRESPONSE: NO HURT
WONGBAKER_NUMERICALRESPONSE: NO HURT

## 2025-07-12 ASSESSMENT — PAIN DESCRIPTION - ORIENTATION
ORIENTATION: RIGHT
ORIENTATION: RIGHT

## 2025-07-12 ASSESSMENT — PAIN - FUNCTIONAL ASSESSMENT
PAIN_FUNCTIONAL_ASSESSMENT: PREVENTS OR INTERFERES SOME ACTIVE ACTIVITIES AND ADLS
PAIN_FUNCTIONAL_ASSESSMENT: PREVENTS OR INTERFERES SOME ACTIVE ACTIVITIES AND ADLS

## 2025-07-12 ASSESSMENT — PAIN DESCRIPTION - DESCRIPTORS
DESCRIPTORS: ACHING

## 2025-07-12 ASSESSMENT — PAIN DESCRIPTION - PAIN TYPE: TYPE: SURGICAL PAIN

## 2025-07-12 ASSESSMENT — PAIN DESCRIPTION - LOCATION
LOCATION: ABDOMEN
LOCATION: ABDOMEN
LOCATION: INCISION;ABDOMEN
LOCATION: ABDOMEN

## 2025-07-12 NOTE — PROGRESS NOTES
Medications delivered from pharmacy about 30 minutes ago and paperwork signed with pt and pharmacy tech.

## 2025-07-12 NOTE — PLAN OF CARE
Problem: Discharge Planning  Goal: Discharge to home or other facility with appropriate resources  7/11/2025 1020 by Rosie Mcginnis RN  Outcome: Progressing     Problem: Safety - Adult  Goal: Free from fall injury  7/11/2025 1020 by Rosie Mcginnis RN  Outcome: Progressing     Problem: Pain  Goal: Verbalizes/displays adequate comfort level or baseline comfort level  7/11/2025 2209 by Alexandra Thompson RN  Flowsheets (Taken 7/11/2025 2209)  Verbalizes/displays adequate comfort level or baseline comfort level:   Encourage patient to monitor pain and request assistance   Assess pain using appropriate pain scale   Administer analgesics based on type and severity of pain and evaluate response  7/11/2025 1020 by Rosie Mcginnis RN  Outcome: Progressing     Problem: Chronic Conditions and Co-morbidities  Goal: Patient's chronic conditions and co-morbidity symptoms are monitored and maintained or improved  7/11/2025 1020 by Rosie Mcginnis RN  Outcome: Progressing

## 2025-07-12 NOTE — PROGRESS NOTES
GENERAL SURGERY  Progress Note    Patient Name: Piper Walker  MRN: [id number]  YOB: 1960   Date of Evaluation: 7/12/2025  Primary Care Physician: Tata Loredo, APRN - CNP      Sigmoid diverticulitis [K57.32]  Abscess of sigmoid colon due to diverticulitis [K57.20]    SUBJECTIVE:     Piper is doing well today.  Her pain is controlled.  She denies nausea or vomiting.  She is passing gas and had liquid bowel movements.  She denies any further bleeding.  She is tolerating a diet.  She is ready for discharge      REVIEW OF SYSTEMS  A comprehensive review of systems was completed and is negative except for any elements noted above.    OBJECTIVE:     BP (!) 163/71   Pulse 52   Temp 97.7 °F (36.5 °C) (Oral)   Resp 18   Ht 1.676 m (5' 5.98\")   Wt 53.1 kg (117 lb)   SpO2 99%   BMI 18.89 kg/m²     PHYSICAL EXAM  Abdomen soft, nondistended, appropriately tender to palpation, surgical sites clean and intact with Dermabond in place      ASSESSMENT & PLAN:       A/P: 64 yo female s/p RAL sigmoid colectomy 7/9     Doing well  Tolerating regular diet  Monitor bowel function  Ambulate/OOB  Continue zosyn.  Plan for augmentin x 1 week at discharge  Plan for discharge home today  Follow-up with Dr. Nunez in 2 weeks       Jamari Farrar MD  General Surgery   [phone]    Electronically signed by Jamari Farrar MD on 7/12/2025 at 10:45 AM

## 2025-07-12 NOTE — CARE COORDINATION
CASE MANAGEMENT DISCHARGE SUMMARY: Discharge order noted. Returning to home. No needs identified.     DISCHARGE DATE: 7/12/25    DISCHARGED TO HOME     TRANSPORTATION: Family             TIME: Afternoon     PREFERRED PHARMACY: [facility]       Aurelio JOHNSON, RN  Case Management  [phone]             Electronically signed by Aurelio Berry RN on 7/12/2025 at 11:16 AM

## 2025-07-12 NOTE — PROGRESS NOTES
Pt discharged to home. Iv removed earlier. Discharge instructions again reviewed with pt and pt's daughter at bedside. Pt dressed and ready to go. Pt transferred by wheelchair with documented belongings and medications to discharge bridge to go home with daughter. Pt and pt's daughter satisfied with plan of care.

## 2025-07-24 ENCOUNTER — OFFICE VISIT (OUTPATIENT)
Dept: SURGERY | Age: 65
End: 2025-07-24

## 2025-07-24 VITALS
HEART RATE: 86 BPM | BODY MASS INDEX: 18.41 KG/M2 | WEIGHT: 114 LBS | SYSTOLIC BLOOD PRESSURE: 110 MMHG | DIASTOLIC BLOOD PRESSURE: 79 MMHG

## 2025-07-24 DIAGNOSIS — N73.9 PELVIC ABSCESS IN FEMALE: ICD-10-CM

## 2025-07-24 DIAGNOSIS — K57.32 SIGMOID DIVERTICULITIS: Primary | ICD-10-CM

## 2025-07-24 PROCEDURE — 99024 POSTOP FOLLOW-UP VISIT: CPT | Performed by: SURGERY

## 2025-07-24 NOTE — PROGRESS NOTES
Post Operative Visit    [facility]   Ashutosh Nunez MD    [address], [address]  [place], [place] [address]  Phone: [phone]  Fax: [phone]    Piper Walker   YOB: 1960    Date of Visit:  7/24/2025    No ref. provider found  Tata Loredo, APRN - CNP    Subjective:     Piper Walker presents for a two week follow-up s/p RAL sigmoid colectomy. Overall she has been doing well since her discharge.  She has been eating/drinking without difficulty.  Her bowel movements have returned to normal, which are every other day.  There has been some relief of her pain.  She denies any fevers or chills.         No Known Allergies  No outpatient medications have been marked as taking for the 7/24/25 encounter (Office Visit) with Ashutosh Nunez MD.       Vitals:    07/24/25 1142   BP: 110/79   Pulse: 86   Weight: 51.7 kg (114 lb)     Body mass index is 18.41 kg/m².     Wt Readings from Last 3 Encounters:   07/24/25 51.7 kg (114 lb)   07/12/25 53.1 kg (117 lb)   06/02/25 54.1 kg (119 lb 4.3 oz)     BP Readings from Last 3 Encounters:   07/24/25 110/79   07/12/25 (!) 163/71   06/20/25 123/79          Objective:    CONSTITUTIONAL:  awake, alert, no apparent distress    LUNGS:  Resp easy and unlabored  ABDOMEN:  incisions c/d/I, no erythema or induration, soft, non-distended, non-tender, voluntary guarding absent,  and hernia absent  MUSCULOSKELETAL: No edema  NEUROLOGIC:  Mental Status Exam:  Level of Alertness:   awake  Orientation:   person, place, time  SKIN: no erythema or induration      Pathology:  FINAL DIAGNOSIS:     Colon, sigmoid, resection      - Large bowel with diverticular disease and focal chronic adhesive        serositis      - No evidence of dysplasia or malignancy      -    KIRSE/KIRSE     ASSESSMENT:     Diagnosis Orders   1. Sigmoid diverticulitis        2. Pelvic abscess in female            PLAN:    Piper Walker

## (undated) DEVICE — NEEDLE HYPO 23GA L1.5IN TURQ POLYPR HUB S STL THN WALL IM

## (undated) DEVICE — ELECTRODE PT RET AD L9FT HI MOIST COND ADH HYDRGEL CORDED

## (undated) DEVICE — GOWN SIRUS NONREIN XL W/TWL: Brand: MEDLINE INDUSTRIES, INC.

## (undated) DEVICE — ARM DRAPE

## (undated) DEVICE — GOWN,AURORA,NONREINF,RAGLAN,XXL,STERILE: Brand: MEDLINE

## (undated) DEVICE — MERCY HEALTH WEST TURNOVER: Brand: MEDLINE INDUSTRIES, INC.

## (undated) DEVICE — STAPLER 60: Brand: SUREFORM

## (undated) DEVICE — ROBOTIC GYN: Brand: MEDLINE INDUSTRIES, INC.

## (undated) DEVICE — TOWEL,OR,DSP,ST,BLUE,STD,4/PK,20PK/CS: Brand: MEDLINE

## (undated) DEVICE — 3M™ IOBAN™ 2 ANTIMICROBIAL INCISE DRAPE 6650EZ: Brand: IOBAN™ 2

## (undated) DEVICE — GLOVE SURG SZ 8 L12IN FNGR THK87MIL DK GRN LTX POLYMER W

## (undated) DEVICE — 1LYRTR 16FR10ML100%SIL UMS SNP: Brand: MEDLINE INDUSTRIES, INC.

## (undated) DEVICE — AIRSEAL 8 MM ACCESS PORT AND LOW PROFILE OBTURATOR WITH BLADELESS OPTICAL TIP, 120 MM LENGTH: Brand: AIRSEAL

## (undated) DEVICE — PMI DISPOSABLE PUNCTURE CLOSURE DEVICE / SUTURE GRASPER: Brand: PMI

## (undated) DEVICE — TRI-LUMEN FILTERED TUBE SET WITH ACTIVATED CHARCOAL FILTER: Brand: AIRSEAL

## (undated) DEVICE — GLOVE ORANGE PI 7 1/2   MSG9075

## (undated) DEVICE — VESSEL SEALER EXTEND: Brand: ENDOWRIST

## (undated) DEVICE — STAPLER INT DIA29MM CLS STPL H1.5-2.2MM OPN LEG L5.2MM 26

## (undated) DEVICE — SEAL

## (undated) DEVICE — BLADELESS OBTURATOR: Brand: WECK VISTA

## (undated) DEVICE — PUMP SUC IRR TBNG L10FT W/ HNDPC ASSEMB STRYKEFLOW 2

## (undated) DEVICE — SUTURE ABSORBABLE L 18 IN SZ 4-0 NDL L 19 MM POLYGLACTIN 910 36/BX

## (undated) DEVICE — PREMIUM DRY TRAY LF: Brand: MEDLINE INDUSTRIES, INC.

## (undated) DEVICE — TIP COVER ACCESSORY

## (undated) DEVICE — NEPTUNE E-SEP SMOKE EVACUATION PENCIL, COATED, 70MM BLADE, PUSH BUTTON SWITCH: Brand: NEPTUNE E-SEP

## (undated) DEVICE — ASCOPE GASTRO (US VERSION): Brand: ASCOPE™ GASTRO

## (undated) DEVICE — SYRINGE MED 30ML STD CLR PLAS LUERLOCK TIP N CTRL DISP

## (undated) DEVICE — SUTURE VICRYL + SZ 0 L27IN ABSRB UD CT-1 L36MM 1/2 CIR TAPR VCP260H

## (undated) DEVICE — SUTURE PERMAHAND SZ 2-0 L30IN NONABSORBABLE BLK SILK W/O A305H

## (undated) DEVICE — TRANSFER SET 3": Brand: MEDLINE INDUSTRIES, INC.

## (undated) DEVICE — DRAPE,LAP,CHOLE,W/TROUGHS,STERILE: Brand: MEDLINE

## (undated) DEVICE — STAPLER 60 RELOAD BLUE: Brand: SUREFORM

## (undated) DEVICE — SUTURE V-LOC 90 SZ 3-0 L6IN ABSRB VLT V-20 L26MM 1/2 CIR VLOCM0604